# Patient Record
Sex: FEMALE | Race: WHITE | NOT HISPANIC OR LATINO | Employment: FULL TIME | URBAN - METROPOLITAN AREA
[De-identification: names, ages, dates, MRNs, and addresses within clinical notes are randomized per-mention and may not be internally consistent; named-entity substitution may affect disease eponyms.]

---

## 2017-01-03 ENCOUNTER — OFFICE VISIT (OUTPATIENT)
Dept: FAMILY MEDICINE | Facility: CLINIC | Age: 37
End: 2017-01-03
Payer: COMMERCIAL

## 2017-01-03 VITALS
HEART RATE: 76 BPM | TEMPERATURE: 98 F | DIASTOLIC BLOOD PRESSURE: 80 MMHG | HEIGHT: 61 IN | SYSTOLIC BLOOD PRESSURE: 124 MMHG | BODY MASS INDEX: 38 KG/M2 | WEIGHT: 201.25 LBS

## 2017-01-03 DIAGNOSIS — R35.0 URINARY FREQUENCY: ICD-10-CM

## 2017-01-03 DIAGNOSIS — K40.90 UNILATERAL INGUINAL HERNIA WITHOUT OBSTRUCTION OR GANGRENE, RECURRENCE NOT SPECIFIED: Primary | ICD-10-CM

## 2017-01-03 PROCEDURE — 99214 OFFICE O/P EST MOD 30 MIN: CPT | Mod: S$GLB,,, | Performed by: FAMILY MEDICINE

## 2017-01-03 PROCEDURE — 1159F MED LIST DOCD IN RCRD: CPT | Mod: S$GLB,,, | Performed by: FAMILY MEDICINE

## 2017-01-03 PROCEDURE — 99999 PR PBB SHADOW E&M-EST. PATIENT-LVL III: CPT | Mod: PBBFAC,,, | Performed by: FAMILY MEDICINE

## 2017-01-03 NOTE — MR AVS SNAPSHOT
"    [facility]  [address] [facility], [address]  [facility] [address]  Phone: [phone]  Fax: [phone]                  Laura Fitzpatrick   1/3/2017 4:00 PM   Office Visit    Description:  Female : 1980   Provider:  Zulema Ha DO   Department:  [facility]           Reason for Visit     Groin Pain     Stool Color Change     Heartburn           Diagnoses this Visit        Comments    Unilateral inguinal hernia without obstruction or gangrene, recurrence not specified    -  Primary            To Do List           Goals (5 Years of Data)     None      [facility]     [facility] Care [facility] -  Assistance  Registered nurses in the [facility] provide clinical advisement, health education, appointment booking, and other advisory services.  Call for this free service at [phone].             Medications           Message regarding Medications     Verify the changes and/or additions to your medication regime listed below are the same as discussed with your clinician today.  If any of these changes or additions are incorrect, please notify your healthcare provider.             Verify that the below list of medications is an accurate representation of the medications you are currently taking.  If none reported, the list may be blank. If incorrect, please contact your healthcare provider. Carry this list with you in case of emergency.           Current Medications     fluoxetine (PROZAC) 20 MG capsule Take 1 capsule (20 mg total) by mouth once daily.    pantoprazole (PROTONIX) 40 MG tablet Take 1 tablet (40 mg total) by mouth once daily.           Clinical Reference Information           Vital Signs - Last Recorded  Most recent update: 1/3/2017  4:35 PM by Alessia Maxwell MA    BP Pulse Temp Ht    124/80 (BP Location: Left arm, Patient Position: Sitting, BP Method: Manual) 76 97.9 °F (36.6 °C) (Oral) 5' 1" (1.549 m)    Wt LMP BMI "    91.3 kg (201 lb 4.5 oz) 01/02/2017 (Exact Date) 38.03 kg/m2      Blood Pressure          Most Recent Value    BP  124/80      Allergies as of 1/3/2017     No Known Allergies      Immunizations Administered on Date of Encounter - 1/3/2017     None      Orders Placed During Today's Visit      Normal Orders This Visit    Ambulatory consult to General Surgery       Maintenance Dialysis History     Patient has no recorded history of maintenance dialysis.

## 2017-01-03 NOTE — PROGRESS NOTES
"Subjective:       Patient ID: Laura Fitzpatrick is a 36 y.o. female.    Chief Complaint: Groin Pain (started about one month ago comes and goes on left side ); Stool Color Change (Sunday and yesterday); and Heartburn    HPI rt groin pain very sudden and intense  That lasted  About 36 hours- PS 7-8 pain scale, Hx of appendectomy 2011,   Since then still with some constipation , mild general abd cramping, still with intermittent rt LQ pain worse with driving,  No change with lifting or bending or walking.      Then this sat night she had more intense heartburn and also some pain in RUQ, lasting about 6 hours or so, no nausea, no vomting , some burping, no fever , no dysuria or urgency ,   Then Sunday she noticed her stool had a  whitish  And "fuzzy-looking coating". She has noticed some bright  blood in stool- mixed in and with wiping, a decent amount   Review of Systems  per HPI  Objective:      Physical Exam   Constitutional: She appears well-developed and well-nourished.   Cardiovascular: Normal rate, regular rhythm and normal heart sounds.    Pulmonary/Chest: Effort normal and breath sounds normal.   Abdominal: Soft. Bowel sounds are normal.   Slight RUQ tenderness and mid epigastric tenderness    Rt inguinal ligament with acute tenderness, no erythema or edema or induration, mild bulging above inguinal ligament with pain and fullness with valsalva maneuver at this area   Nursing note and vitals reviewed.      Assessment:         Laura was seen today for groin pain, stool color change and heartburn.    Diagnoses and all orders for this visit:    Unilateral inguinal hernia without obstruction or gangrene, recurrence not specified  -     Ambulatory consult to General Surgery      RUQ pain - now resolved - probably a separate issue- possible GERD vs biliary colic.   If symptoms return then notify me and we will order an abd US      "

## 2017-01-16 ENCOUNTER — OFFICE VISIT (OUTPATIENT)
Dept: SURGERY | Facility: CLINIC | Age: 37
End: 2017-01-16
Payer: COMMERCIAL

## 2017-01-16 VITALS
HEART RATE: 83 BPM | SYSTOLIC BLOOD PRESSURE: 121 MMHG | WEIGHT: 200.63 LBS | BODY MASS INDEX: 37.88 KG/M2 | TEMPERATURE: 98 F | HEIGHT: 61 IN | DIASTOLIC BLOOD PRESSURE: 85 MMHG

## 2017-01-16 DIAGNOSIS — K40.90 UNILATERAL INGUINAL HERNIA WITHOUT OBSTRUCTION OR GANGRENE, RECURRENCE NOT SPECIFIED: Primary | ICD-10-CM

## 2017-01-16 DIAGNOSIS — K40.90 RIGHT INGUINAL HERNIA: Primary | ICD-10-CM

## 2017-01-16 PROCEDURE — 99203 OFFICE O/P NEW LOW 30 MIN: CPT | Mod: S$GLB,,, | Performed by: SURGERY

## 2017-01-16 PROCEDURE — 99999 PR PBB SHADOW E&M-EST. PATIENT-LVL III: CPT | Mod: PBBFAC,,, | Performed by: SURGERY

## 2017-01-16 PROCEDURE — 1159F MED LIST DOCD IN RCRD: CPT | Mod: S$GLB,,, | Performed by: SURGERY

## 2017-01-16 NOTE — PROGRESS NOTES
History & Physical    SUBJECTIVE:     History of Present Illness:  Patient is a 36 y.o. female presents with right groin pain. Onset of symptoms was abrupt starting several months ago with gradually worsening course since that time. Patient denies palpable bulge. Symptoms are aggravated by activity. Symptoms improve with rest.      Chief Complaint   Patient presents with    Consult    Right Inguinal Hernia       Review of patient's allergies indicates:  No Known Allergies    Current Outpatient Prescriptions   Medication Sig Dispense Refill    fluoxetine (PROZAC) 20 MG capsule Take 1 capsule (20 mg total) by mouth once daily. 30 capsule 11    pantoprazole (PROTONIX) 40 MG tablet Take 1 tablet (40 mg total) by mouth once daily. 30 tablet 1     No current facility-administered medications for this visit.        Past Medical History   Diagnosis Date    Abnormal Pap smear 12 years ago     Cryotherapy    Obesity (BMI 30-39.9)      Past Surgical History   Procedure Laterality Date    Appendectomy  2011    Myringotomy w/ tubes       Family History   Problem Relation Age of Onset    Heart disease Mother 40    Diabetes Mother     Depression Mother     Anxiety disorder Mother     Obesity Mother     Hypertension Father     Diabetes Father     Obesity Father     Kidney disease Father     Heart disease Maternal Grandmother     Diabetes Maternal Grandmother     Diabetes Maternal Grandfather     Cancer Paternal Grandmother 58     Breast    Diabetes Paternal Grandmother     Breast cancer Paternal Grandmother     Gestational diabetes Sister     Stroke Neg Hx     Colon cancer Neg Hx     Ovarian cancer Neg Hx      Social History   Substance Use Topics    Smoking status: Never Smoker    Smokeless tobacco: Never Used    Alcohol use 0.0 oz/week     0 Standard drinks or equivalent per week      Comment: social        Review of Systems:  Review of Systems   Constitutional: Negative.    HENT: Negative.   "  Respiratory: Negative.    Cardiovascular: Negative.    Gastrointestinal: Negative.    Genitourinary: Negative.    Allergic/Immunologic: Negative.    Neurological: Negative.    Hematological: Negative.        OBJECTIVE:     Vital Signs (Most Recent)  Temp: 98.4 °F (36.9 °C) (01/16/17 0916)  Pulse: 83 (01/16/17 0916)  BP: 121/85 (01/16/17 0916)  5' 1" (1.549 m)  91 kg (200 lb 9.6 oz)     Physical Exam:  Physical Exam   Constitutional: She is oriented to person, place, and time. She appears well-developed and well-nourished.   HENT:   Head: Normocephalic and atraumatic.   Eyes: Conjunctivae and EOM are normal. Pupils are equal, round, and reactive to light.   Neck: Normal range of motion. Neck supple.   Cardiovascular: Normal rate, regular rhythm and normal heart sounds.    Pulmonary/Chest: Effort normal and breath sounds normal.   Abdominal: Soft. Bowel sounds are normal. A hernia is present.   Reducible right inguinal hernia   Musculoskeletal: Normal range of motion.   Neurological: She is alert and oriented to person, place, and time. She has normal reflexes.   Skin: Skin is warm and dry.   Psychiatric: She has a normal mood and affect.       Laboratory  none    Diagnostic Results:  none    ASSESSMENT/PLAN:     Right inguinal hernia    PLAN:Plan     Repair with mesh.       "

## 2017-01-16 NOTE — PATIENT INSTRUCTIONS
What Is a Hernia?    A hernia is when an organ or tissue pushes through a weak area in the belly (abdominal) wall. This weak area may be present at birth. Or it may be caused by abdominal strain over time. If not treated, a hernia can get worse with time and physical stress.  When a bulge forms  When there is a weak area in the abdominal wall, an organ or tissue can push outward. This often causes a bulge that you can see under your skin. The bulge may get bigger when you stand up. It may go away when you lie down. You may also feel some pressure or mild pain when lifting, coughing, urinating, or doing other activities.  Types of hernias  The type of hernia you have depends on its location. Most hernias form in the groin at or near the internal ring. This is the entrance to a canal between the abdomen and groin. Hernias can also occur in the abdomen, thigh, or genitals.  · An incisional hernia occurs at the site of a previous surgical incision.  · An umbilical hernia occurs at the navel.  · An indirect inguinal hernia occurs in the groin at the internal ring.  · A direct inguinal hernia occurs in the groin near the internal ring.  · A femoral hernia occurs just below the groin.  · An epigastric hernia occurs in the upper abdomen at the midline.  Diagnosis  In most cases, your healthcare provider can diagnose a hernia by doing a physical exam.  In some cases it might not be clear why you have a swelling in the belly wall. Then your provider may order an imaging test such as an ultrasound. This can help with the diagnosis.  Surgery  A hernia will not heal on its own. Surgery is needed to fix the weak spot in the abdominal wall. If not treated, a hernia can get larger. It can also cause serious health problems. The good news is that hernia surgery can be done quickly and safely. In some cases, you can go home the same day as your surgery.   When to call your provider  Call your healthcare provider right away if the  swelling around your hernia becomes larger, firmer, or more painful. These may be signs that your intestines are stuck in the abdominal wall. This is an emergency. The hernia must be repaired right away to avoid serious problems.  © 2169-9107 [employer]. [address] [place] [address]. All rights reserved. This information is not intended as a substitute for professional medical care. Always follow your healthcare professional's instructions.        How a Hernia Develops  Although a hernia bulge may appear suddenly, hernias often take years to develop. They grow larger as pressure inside the body presses the intestines or other tissues out through a weak area in the abdominal wall, often at the belly button, or a site of previous surgery. With time, these tissues can bulge out beneath the skin.  Stages of hernia development       The wall weakens or tears: The abdominal lining bulges out through a weak area and begins to form a hernia sac. The sac may contain fat, intestine, or other tissues. At this point, the hernia may or may not cause a visible bulge.        The intestine pushes into the sac: As the intestine pushes further into the sac, it forms a visible bulge. The bulge may flatten when you lie down or push against it. This is called a reducible hernia and does not cause any immediate danger.             The intestine may become trapped: The sac containing the intestine may become trapped by muscle (incarcerated). If this happens, you wont be able to flatten the bulge. You may also have pain. Prompt treatment is needed.        The intestine may become strangulated: If the intestine is tightly trapped, it becomes strangulated. The strangulated area loses blood supply and may die. This can cause severe pain and block the intestine. Emergency surgery is needed.   © 3691-2604 [employer]. [address] [place] [address]. All rights reserved. This information is  not intended as a substitute for professional medical care. Always follow your healthcare professional's instructions.        Having Hernia Surgery: Patch Repair  Surgery treats a hernia by repairing the weakness in the abdominal wall. An incision is made so the surgeon has a direct view of the hernia. The repair is then done through this incision (open surgery). To repair the defect, special mesh materials are used to patch the weak area and make a tension-free repair. Follow your doctors advice on how to get ready for the procedure. You can usually go home the same day as your surgery. In some cases, though, you may need to stay in the hospital overnight.  Getting Ready for Surgery  Your doctor will talk with you about preparing for surgery. Follow all the instructions youre given and be sure to:   · Tell your doctor about any medications, supplements, or herbs you take. This includes both prescription and over-the-counter items.  · Stop taking aspirin, ibuprofen, naproxen and other NSAIDs as directed.  · Arrange for an adult family member or friend to give you a ride home after surgery.  · Stop smoking. Smoking affects blood flow and can slow healing.  · Gently wash the surgical area the night before surgery.  · Dont eat or drink after midnight, the night before your surgery. You may need to take some medication with sips of water on the day of surgery. Talk with your doctor.     Repair in Front           Repair in Back           Combination Repair      The Day of Surgery  Arrive at the hospital or surgical center at your scheduled time. Youll be asked to change into a patient gown. Youll then be given an IV to provide fluids and medication. Shortly before surgery, an anesthesiologist will talk with you. He or she will explain the types of anesthesia used to prevent pain during surgery. You will have one or more of the following:  · Monitored sedation to make you relaxed and sleepy.  · Local anesthesia to  numb the surgical site.  · Regional anesthesia to numb specific areas of your body.  · General anesthesia to let you sleep during surgery.  During the Surgery  Most hernias are treated using tension-free repairs. This is surgery that uses special mesh materials to repair the weak area. Unlike traditional repairs, the abdominal fascia (tissue around the muscle that gives strength to the abdominal wall) isnt sutured together. Instead, the mesh covers the weak area like a patch. This repairs the defect without tension on the muscles. It also makes it less likely to happen again. The mesh is made of strong, flexible plastic that stays in the body. Over time, nearby tissues grow into the mesh to strengthen the repair.  After Surgery  When the procedure is over, youll be taken to the recovery area to rest. Your blood pressure and heart rate will be monitored. Youll also have a bandage over the surgical site. To help reduce discomfort, youll be given pain medications. You may also be given breathing exercises to keep your lungs clear. Later, youll be asked to get up and walk. This helps prevent blood clots in the legs. You can go home when your doctor says youre ready.     Risks and Complications  Hernia surgery is safe, but does have risks including:  · Bleeding  · Infection  · Anesthesia risks  · Mesh complications  · Inability to urinate   · Bowel or bladder injury   · Numbness or pain in the groin or leg  · Risk the hernia will recur  · Damage to the testicles or testicular function      © 6993-0639 [employer]. [address] [place] [address], [place] [address]. All rights reserved. This information is not intended as a substitute for professional medical care. Always follow your healthcare professional's instructions.        After Hernia Surgery    You can usually go home the same day as surgery. If you had surgery to repair ventral or incisional hernia, you may need to stay in the hospital  overnight. To speed healing, take an active role in your recovery. The tips below can help:  Reducing Swelling  Early on, its common for the area around your incision to be swollen, bruised, and sore. To reduce swelling, put an ice pack or bag of frozen peas in a thin towel. Place the towel on the swollen area 3 to 5 times a day for 15 to 20 minutes at a time.  Managing Pain  Take any prescribed pain medications as directed. Be aware that some pain medications can cause constipation. So your doctor may also suggest a laxative or stool softener.  Returning to Normal  You can return to your normal routine as soon as you feel able. Just take it easy and follow these guidelines:  · Take short walks to improve circulation.  · Avoid heavy lifting for at least a week.  · Ask your doctor about driving and returning to work.  · You can begin having sex again when you feel ready.  Following Up  Be sure to keep all follow-up appointments with your doctor. These ensure youre healing well. During visits, your stitches, staples, or bandage may be removed.  Call Your Doctor  Call your doctor if you have any of the following:  · A large amount of swelling or bruising (some testicular swelling and bruising is normal)  · Fever over 101°F (38.3°C)  · Increasing pain, redness, bleeding, or drainage from the incision  · Trouble urinating  · Constipation  · Vomiting   © 6310-6077 [employer]. [address] [place] [address], [place] [address]. All rights reserved. This information is not intended as a substitute for professional medical care. Always follow your healthcare professional's instructions.

## 2017-01-16 NOTE — LETTER
January 16, 2017      Zulema Ha, DO  [address] [facility]  [address]  [facility] [address]           [facility]  [address]  [place] [address]  Phone: [phone]          Patient: Laura Fitzpatrick   MR Number: [id number]   YOB: 1980   Date of Visit: 1/16/2017       Dear Dr. Zulema Ha:    Thank you for referring Laura Fitzpatrick to me for evaluation. Attached you will find relevant portions of my assessment and plan of care.    If you have questions, please do not hesitate to call me. I look forward to following Laura Fitzpatrick along with you.    Sincerely,    Jesus Messina MD    Enclosure  CC:  No Recipients    If you would like to receive this communication electronically, please contact [email or web address] or [phone] to request more information on [employer] Link access.    For providers and/or their staff who would like to refer a patient to [facility], please contact us through our one-stop-shop provider referral line, [facility], at [phone].    If you feel you have received this communication in error or would no longer like to receive these types of communications, please e-mail [email or web address]

## 2017-01-16 NOTE — MR AVS SNAPSHOT
"    [facility] - General Surgery  [address]  [facility] [address]  Phone: [phone]                  Laura Fitzpatrick   2017 9:15 AM   Office Visit    Description:  Female : 1980   Provider:  Jesus Messina MD   Department:  [facility] - General Surgery           Reason for Visit     Consult     Right Inguinal Hernia           Diagnoses this Visit        Comments    Unilateral inguinal hernia without obstruction or gangrene, recurrence not specified    -  Primary            To Do List           Goals (5 Years of Data)     None      [facility]     [facility] -  Assistance  Registered nurses in the [facility] provide clinical advisement, health education, appointment booking, and other advisory services.  Call for this free service at [phone].             Medications           Message regarding Medications     Verify the changes and/or additions to your medication regime listed below are the same as discussed with your clinician today.  If any of these changes or additions are incorrect, please notify your healthcare provider.             Verify that the below list of medications is an accurate representation of the medications you are currently taking.  If none reported, the list may be blank. If incorrect, please contact your healthcare provider. Carry this list with you in case of emergency.           Current Medications     fluoxetine (PROZAC) 20 MG capsule Take 1 capsule (20 mg total) by mouth once daily.    pantoprazole (PROTONIX) 40 MG tablet Take 1 tablet (40 mg total) by mouth once daily.           Clinical Reference Information           Vital Signs - Last Recorded  Most recent update: 2017  9:17 AM by Vinicius Dooley    BP Pulse Temp Ht    121/85 (BP Location: Right arm, Patient Position: Sitting, BP Method: Automatic) 83 98.4 °F (36.9 °C) (Oral) 5' 1" (1.549 m)    Wt LMP BMI    91 kg (200 lb 9.6 oz) 2017 (Exact " Date) 37.9 kg/m2      Blood Pressure          Most Recent Value    BP  121/85      Allergies as of 1/16/2017     No Known Allergies      Immunizations Administered on Date of Encounter - 1/16/2017     None      Maintenance Dialysis History     Patient has no recorded history of maintenance dialysis.      Instructions      What Is a Hernia?    A hernia is when an organ or tissue pushes through a weak area in the belly (abdominal) wall. This weak area may be present at birth. Or it may be caused by abdominal strain over time. If not treated, a hernia can get worse with time and physical stress.  When a bulge forms  When there is a weak area in the abdominal wall, an organ or tissue can push outward. This often causes a bulge that you can see under your skin. The bulge may get bigger when you stand up. It may go away when you lie down. You may also feel some pressure or mild pain when lifting, coughing, urinating, or doing other activities.  Types of hernias  The type of hernia you have depends on its location. Most hernias form in the groin at or near the internal ring. This is the entrance to a canal between the abdomen and groin. Hernias can also occur in the abdomen, thigh, or genitals.  · An incisional hernia occurs at the site of a previous surgical incision.  · An umbilical hernia occurs at the navel.  · An indirect inguinal hernia occurs in the groin at the internal ring.  · A direct inguinal hernia occurs in the groin near the internal ring.  · A femoral hernia occurs just below the groin.  · An epigastric hernia occurs in the upper abdomen at the midline.  Diagnosis  In most cases, your healthcare provider can diagnose a hernia by doing a physical exam.  In some cases it might not be clear why you have a swelling in the belly wall. Then your provider may order an imaging test such as an ultrasound. This can help with the diagnosis.  Surgery  A hernia will not heal on its own. Surgery is needed to fix the weak  spot in the abdominal wall. If not treated, a hernia can get larger. It can also cause serious health problems. The good news is that hernia surgery can be done quickly and safely. In some cases, you can go home the same day as your surgery.   When to call your provider  Call your healthcare provider right away if the swelling around your hernia becomes larger, firmer, or more painful. These may be signs that your intestines are stuck in the abdominal wall. This is an emergency. The hernia must be repaired right away to avoid serious problems.  © 0547-6262 [employer]. [address] [place] [address]. All rights reserved. This information is not intended as a substitute for professional medical care. Always follow your healthcare professional's instructions.        How a Hernia Develops  Although a hernia bulge may appear suddenly, hernias often take years to develop. They grow larger as pressure inside the body presses the intestines or other tissues out through a weak area in the abdominal wall, often at the belly button, or a site of previous surgery. With time, these tissues can bulge out beneath the skin.  Stages of hernia development       The wall weakens or tears: The abdominal lining bulges out through a weak area and begins to form a hernia sac. The sac may contain fat, intestine, or other tissues. At this point, the hernia may or may not cause a visible bulge.        The intestine pushes into the sac: As the intestine pushes further into the sac, it forms a visible bulge. The bulge may flatten when you lie down or push against it. This is called a reducible hernia and does not cause any immediate danger.             The intestine may become trapped: The sac containing the intestine may become trapped by muscle (incarcerated). If this happens, you wont be able to flatten the bulge. You may also have pain. Prompt treatment is needed.        The intestine may become strangulated: If  the intestine is tightly trapped, it becomes strangulated. The strangulated area loses blood supply and may die. This can cause severe pain and block the intestine. Emergency surgery is needed.   © 4872-2405 [employer]. [address] [place] [address], [place] [address]. All rights reserved. This information is not intended as a substitute for professional medical care. Always follow your healthcare professional's instructions.        Having Hernia Surgery: Patch Repair  Surgery treats a hernia by repairing the weakness in the abdominal wall. An incision is made so the surgeon has a direct view of the hernia. The repair is then done through this incision (open surgery). To repair the defect, special mesh materials are used to patch the weak area and make a tension-free repair. Follow your doctors advice on how to get ready for the procedure. You can usually go home the same day as your surgery. In some cases, though, you may need to stay in the hospital overnight.  Getting Ready for Surgery  Your doctor will talk with you about preparing for surgery. Follow all the instructions youre given and be sure to:   · Tell your doctor about any medications, supplements, or herbs you take. This includes both prescription and over-the-counter items.  · Stop taking aspirin, ibuprofen, naproxen and other NSAIDs as directed.  · Arrange for an adult family member or friend to give you a ride home after surgery.  · Stop smoking. Smoking affects blood flow and can slow healing.  · Gently wash the surgical area the night before surgery.  · Dont eat or drink after midnight, the night before your surgery. You may need to take some medication with sips of water on the day of surgery. Talk with your doctor.     Repair in Front           Repair in Back           Combination Repair      The Day of Surgery  Arrive at the hospital or surgical center at your scheduled time. Youll be asked to change into a patient gown. Youll then  be given an IV to provide fluids and medication. Shortly before surgery, an anesthesiologist will talk with you. He or she will explain the types of anesthesia used to prevent pain during surgery. You will have one or more of the following:  · Monitored sedation to make you relaxed and sleepy.  · Local anesthesia to numb the surgical site.  · Regional anesthesia to numb specific areas of your body.  · General anesthesia to let you sleep during surgery.  During the Surgery  Most hernias are treated using tension-free repairs. This is surgery that uses special mesh materials to repair the weak area. Unlike traditional repairs, the abdominal fascia (tissue around the muscle that gives strength to the abdominal wall) isnt sutured together. Instead, the mesh covers the weak area like a patch. This repairs the defect without tension on the muscles. It also makes it less likely to happen again. The mesh is made of strong, flexible plastic that stays in the body. Over time, nearby tissues grow into the mesh to strengthen the repair.  After Surgery  When the procedure is over, youll be taken to the recovery area to rest. Your blood pressure and heart rate will be monitored. Youll also have a bandage over the surgical site. To help reduce discomfort, youll be given pain medications. You may also be given breathing exercises to keep your lungs clear. Later, youll be asked to get up and walk. This helps prevent blood clots in the legs. You can go home when your doctor says youre ready.     Risks and Complications  Hernia surgery is safe, but does have risks including:  · Bleeding  · Infection  · Anesthesia risks  · Mesh complications  · Inability to urinate   · Bowel or bladder injury   · Numbness or pain in the groin or leg  · Risk the hernia will recur  · Damage to the testicles or testicular function      © 8872-2306 [employer]. [address] [place] [address], [place] [address]. All rights reserved. This  information is not intended as a substitute for professional medical care. Always follow your healthcare professional's instructions.        After Hernia Surgery    You can usually go home the same day as surgery. If you had surgery to repair ventral or incisional hernia, you may need to stay in the hospital overnight. To speed healing, take an active role in your recovery. The tips below can help:  Reducing Swelling  Early on, its common for the area around your incision to be swollen, bruised, and sore. To reduce swelling, put an ice pack or bag of frozen peas in a thin towel. Place the towel on the swollen area 3 to 5 times a day for 15 to 20 minutes at a time.  Managing Pain  Take any prescribed pain medications as directed. Be aware that some pain medications can cause constipation. So your doctor may also suggest a laxative or stool softener.  Returning to Normal  You can return to your normal routine as soon as you feel able. Just take it easy and follow these guidelines:  · Take short walks to improve circulation.  · Avoid heavy lifting for at least a week.  · Ask your doctor about driving and returning to work.  · You can begin having sex again when you feel ready.  Following Up  Be sure to keep all follow-up appointments with your doctor. These ensure youre healing well. During visits, your stitches, staples, or bandage may be removed.  Call Your Doctor  Call your doctor if you have any of the following:  · A large amount of swelling or bruising (some testicular swelling and bruising is normal)  · Fever over 101°F (38.3°C)  · Increasing pain, redness, bleeding, or drainage from the incision  · Trouble urinating  · Constipation  · Vomiting   © 2654-8796 [employer]. [address] [place] [address], [place] [address]. All rights reserved. This information is not intended as a substitute for professional medical care. Always follow your healthcare professional's instructions.

## 2017-01-23 ENCOUNTER — TELEPHONE (OUTPATIENT)
Dept: SURGERY | Facility: CLINIC | Age: 37
End: 2017-01-23

## 2017-01-23 NOTE — PRE-PROCEDURE INSTRUCTIONS
PreOp Instructions given:    -- Medication information (what to hold and what to take)   -- NPO guidelines -- DO NOT EAT ANYTHING AFTER MIDNIGHT, INCLUDING GUM, HARD CANDY, MINTS, OR CHEWING TOBACCO.  -- Arrival place and directions given; time to be given the day before procedure by the Surgeon's Office   -- Bathing with antibacterial soap   -- Don't wear any jewelry or bring any valuables AM of surgery   -- No makeup or moisturizer to face   -- No perfume/cologne, powder, lotions or aftershave     Pt verbalized understanding.

## 2017-01-24 ENCOUNTER — ANESTHESIA (OUTPATIENT)
Dept: SURGERY | Facility: HOSPITAL | Age: 37
End: 2017-01-24
Payer: COMMERCIAL

## 2017-01-24 ENCOUNTER — ANESTHESIA EVENT (OUTPATIENT)
Dept: SURGERY | Facility: HOSPITAL | Age: 37
End: 2017-01-24
Payer: COMMERCIAL

## 2017-01-24 ENCOUNTER — HOSPITAL ENCOUNTER (OUTPATIENT)
Facility: HOSPITAL | Age: 37
Discharge: HOME | End: 2017-01-24
Attending: SURGERY | Admitting: SURGERY
Payer: COMMERCIAL

## 2017-01-24 VITALS
OXYGEN SATURATION: 96 % | DIASTOLIC BLOOD PRESSURE: 70 MMHG | WEIGHT: 200 LBS | TEMPERATURE: 98 F | RESPIRATION RATE: 18 BRPM | HEART RATE: 82 BPM | BODY MASS INDEX: 37.76 KG/M2 | SYSTOLIC BLOOD PRESSURE: 120 MMHG | HEIGHT: 61 IN

## 2017-01-24 DIAGNOSIS — K40.90 INGUINAL HERNIA OF RIGHT SIDE WITHOUT OBSTRUCTION OR GANGRENE: ICD-10-CM

## 2017-01-24 LAB
B-HCG UR QL: NEGATIVE
CTP QC/QA: YES

## 2017-01-24 PROCEDURE — 49505 PRP I/HERN INIT REDUC >5 YR: CPT | Mod: RT,,, | Performed by: SURGERY

## 2017-01-24 PROCEDURE — 81025 URINE PREGNANCY TEST: CPT | Performed by: SURGERY

## 2017-01-24 PROCEDURE — D9220A PRA ANESTHESIA: Mod: ANES,,, | Performed by: ANESTHESIOLOGY

## 2017-01-24 PROCEDURE — 63600175 PHARM REV CODE 636 W HCPCS: Performed by: ANESTHESIOLOGY

## 2017-01-24 PROCEDURE — 37000008 HC ANESTHESIA 1ST 15 MINUTES: Performed by: SURGERY

## 2017-01-24 PROCEDURE — 25000003 PHARM REV CODE 250: Performed by: STUDENT IN AN ORGANIZED HEALTH CARE EDUCATION/TRAINING PROGRAM

## 2017-01-24 PROCEDURE — 25000003 PHARM REV CODE 250: Performed by: NURSE ANESTHETIST, CERTIFIED REGISTERED

## 2017-01-24 PROCEDURE — 63600175 PHARM REV CODE 636 W HCPCS

## 2017-01-24 PROCEDURE — 36000706: Performed by: SURGERY

## 2017-01-24 PROCEDURE — 36000707: Performed by: SURGERY

## 2017-01-24 PROCEDURE — 37000009 HC ANESTHESIA EA ADD 15 MINS: Performed by: SURGERY

## 2017-01-24 PROCEDURE — 25000003 PHARM REV CODE 250: Performed by: SURGERY

## 2017-01-24 PROCEDURE — C1781 MESH (IMPLANTABLE): HCPCS | Performed by: SURGERY

## 2017-01-24 PROCEDURE — 71000015 HC POSTOP RECOV 1ST HR: Performed by: SURGERY

## 2017-01-24 PROCEDURE — 88302 TISSUE EXAM BY PATHOLOGIST: CPT | Mod: 26,,, | Performed by: PATHOLOGY

## 2017-01-24 PROCEDURE — 94761 N-INVAS EAR/PLS OXIMETRY MLT: CPT

## 2017-01-24 PROCEDURE — 63600175 PHARM REV CODE 636 W HCPCS: Performed by: NURSE ANESTHETIST, CERTIFIED REGISTERED

## 2017-01-24 PROCEDURE — D9220A PRA ANESTHESIA: Mod: CRNA,,, | Performed by: NURSE ANESTHETIST, CERTIFIED REGISTERED

## 2017-01-24 PROCEDURE — 71000039 HC RECOVERY, EACH ADD'L HOUR: Performed by: SURGERY

## 2017-01-24 PROCEDURE — 88302 TISSUE EXAM BY PATHOLOGIST: CPT | Performed by: PATHOLOGY

## 2017-01-24 PROCEDURE — 71000033 HC RECOVERY, INTIAL HOUR: Performed by: SURGERY

## 2017-01-24 PROCEDURE — 27000221 HC OXYGEN, UP TO 24 HOURS

## 2017-01-24 DEVICE — IMPLANTABLE DEVICE: Type: IMPLANTABLE DEVICE | Site: GROIN | Status: FUNCTIONAL

## 2017-01-24 RX ORDER — ACETAMINOPHEN 10 MG/ML
INJECTION, SOLUTION INTRAVENOUS
Status: DISCONTINUED | OUTPATIENT
Start: 2017-01-24 | End: 2017-01-24

## 2017-01-24 RX ORDER — OXYCODONE AND ACETAMINOPHEN 10; 325 MG/1; MG/1
1 TABLET ORAL ONCE
Status: COMPLETED | OUTPATIENT
Start: 2017-01-24 | End: 2017-01-24

## 2017-01-24 RX ORDER — DEXAMETHASONE SODIUM PHOSPHATE 4 MG/ML
INJECTION, SOLUTION INTRA-ARTICULAR; INTRALESIONAL; INTRAMUSCULAR; INTRAVENOUS; SOFT TISSUE
Status: DISCONTINUED | OUTPATIENT
Start: 2017-01-24 | End: 2017-01-24

## 2017-01-24 RX ORDER — ROCURONIUM BROMIDE 10 MG/ML
INJECTION, SOLUTION INTRAVENOUS
Status: DISCONTINUED | OUTPATIENT
Start: 2017-01-24 | End: 2017-01-24

## 2017-01-24 RX ORDER — KETOROLAC TROMETHAMINE 30 MG/ML
INJECTION, SOLUTION INTRAMUSCULAR; INTRAVENOUS
Status: DISCONTINUED | OUTPATIENT
Start: 2017-01-24 | End: 2017-01-24

## 2017-01-24 RX ORDER — LIDOCAINE HYDROCHLORIDE 10 MG/ML
1 INJECTION, SOLUTION EPIDURAL; INFILTRATION; INTRACAUDAL; PERINEURAL ONCE
Status: COMPLETED | OUTPATIENT
Start: 2017-01-24 | End: 2017-01-24

## 2017-01-24 RX ORDER — PROPOFOL 10 MG/ML
VIAL (ML) INTRAVENOUS
Status: DISCONTINUED | OUTPATIENT
Start: 2017-01-24 | End: 2017-01-24

## 2017-01-24 RX ORDER — GLYCOPYRROLATE 0.2 MG/ML
INJECTION INTRAMUSCULAR; INTRAVENOUS
Status: DISCONTINUED | OUTPATIENT
Start: 2017-01-24 | End: 2017-01-24

## 2017-01-24 RX ORDER — BUPIVACAINE HYDROCHLORIDE 5 MG/ML
INJECTION, SOLUTION EPIDURAL; INTRACAUDAL
Status: DISCONTINUED | OUTPATIENT
Start: 2017-01-24 | End: 2017-01-24 | Stop reason: HOSPADM

## 2017-01-24 RX ORDER — LIDOCAINE HCL/PF 100 MG/5ML
SYRINGE (ML) INTRAVENOUS
Status: DISCONTINUED | OUTPATIENT
Start: 2017-01-24 | End: 2017-01-24

## 2017-01-24 RX ORDER — SUCCINYLCHOLINE CHLORIDE 20 MG/ML
INJECTION INTRAMUSCULAR; INTRAVENOUS
Status: DISCONTINUED | OUTPATIENT
Start: 2017-01-24 | End: 2017-01-24

## 2017-01-24 RX ORDER — DIPHENHYDRAMINE HYDROCHLORIDE 50 MG/ML
25 INJECTION INTRAMUSCULAR; INTRAVENOUS EVERY 6 HOURS PRN
Status: DISCONTINUED | OUTPATIENT
Start: 2017-01-24 | End: 2017-01-24 | Stop reason: HOSPADM

## 2017-01-24 RX ORDER — NEOSTIGMINE METHYLSULFATE 1 MG/ML
INJECTION, SOLUTION INTRAVENOUS
Status: DISCONTINUED | OUTPATIENT
Start: 2017-01-24 | End: 2017-01-24

## 2017-01-24 RX ORDER — FENTANYL CITRATE 50 UG/ML
INJECTION, SOLUTION INTRAMUSCULAR; INTRAVENOUS
Status: DISCONTINUED | OUTPATIENT
Start: 2017-01-24 | End: 2017-01-24

## 2017-01-24 RX ORDER — OXYCODONE AND ACETAMINOPHEN 5; 325 MG/1; MG/1
1-2 TABLET ORAL EVERY 4 HOURS PRN
Qty: 41 TABLET | Refills: 0 | Status: SHIPPED | OUTPATIENT
Start: 2017-01-24 | End: 2017-02-06

## 2017-01-24 RX ORDER — ONDANSETRON 2 MG/ML
INJECTION INTRAMUSCULAR; INTRAVENOUS
Status: DISCONTINUED | OUTPATIENT
Start: 2017-01-24 | End: 2017-01-24

## 2017-01-24 RX ORDER — MIDAZOLAM HYDROCHLORIDE 1 MG/ML
INJECTION, SOLUTION INTRAMUSCULAR; INTRAVENOUS
Status: DISCONTINUED | OUTPATIENT
Start: 2017-01-24 | End: 2017-01-24

## 2017-01-24 RX ORDER — DOCUSATE SODIUM 100 MG/1
100 CAPSULE, LIQUID FILLED ORAL 2 TIMES DAILY
Refills: 0 | COMMUNITY
Start: 2017-01-24 | End: 2017-05-17 | Stop reason: ALTCHOICE

## 2017-01-24 RX ORDER — SODIUM CHLORIDE 9 MG/ML
INJECTION, SOLUTION INTRAVENOUS CONTINUOUS
Status: DISCONTINUED | OUTPATIENT
Start: 2017-01-24 | End: 2017-01-24 | Stop reason: HOSPADM

## 2017-01-24 RX ORDER — SODIUM CHLORIDE 0.9 % (FLUSH) 0.9 %
3 SYRINGE (ML) INJECTION
Status: DISCONTINUED | OUTPATIENT
Start: 2017-01-24 | End: 2017-01-24 | Stop reason: HOSPADM

## 2017-01-24 RX ORDER — OXYCODONE AND ACETAMINOPHEN 5; 325 MG/1; MG/1
1-2 TABLET ORAL EVERY 4 HOURS PRN
Qty: 41 TABLET | Refills: 0 | Status: SHIPPED | OUTPATIENT
Start: 2017-01-24 | End: 2017-01-24

## 2017-01-24 RX ORDER — OXYCODONE AND ACETAMINOPHEN 5; 325 MG/1; MG/1
2 TABLET ORAL EVERY 4 HOURS PRN
Status: DISCONTINUED | OUTPATIENT
Start: 2017-01-24 | End: 2017-01-24 | Stop reason: HOSPADM

## 2017-01-24 RX ORDER — HYDROMORPHONE HYDROCHLORIDE 1 MG/ML
INJECTION, SOLUTION INTRAMUSCULAR; INTRAVENOUS; SUBCUTANEOUS
Status: COMPLETED
Start: 2017-01-24 | End: 2017-01-24

## 2017-01-24 RX ORDER — HYDROMORPHONE HYDROCHLORIDE 1 MG/ML
0.2 INJECTION, SOLUTION INTRAMUSCULAR; INTRAVENOUS; SUBCUTANEOUS EVERY 5 MIN PRN
Status: COMPLETED | OUTPATIENT
Start: 2017-01-24 | End: 2017-01-24

## 2017-01-24 RX ADMIN — HYDROMORPHONE HYDROCHLORIDE 0.2 MG: 1 INJECTION, SOLUTION INTRAMUSCULAR; INTRAVENOUS; SUBCUTANEOUS at 08:01

## 2017-01-24 RX ADMIN — HYDROMORPHONE HYDROCHLORIDE 0.2 MG: 1 INJECTION, SOLUTION INTRAMUSCULAR; INTRAVENOUS; SUBCUTANEOUS at 09:01

## 2017-01-24 RX ADMIN — LIDOCAINE HYDROCHLORIDE 10 MG: 10 INJECTION, SOLUTION EPIDURAL; INFILTRATION; INTRACAUDAL; PERINEURAL at 06:01

## 2017-01-24 RX ADMIN — SODIUM CHLORIDE: 0.9 INJECTION, SOLUTION INTRAVENOUS at 06:01

## 2017-01-24 RX ADMIN — ROCURONIUM BROMIDE 5 MG: 10 INJECTION, SOLUTION INTRAVENOUS at 07:01

## 2017-01-24 RX ADMIN — KETOROLAC TROMETHAMINE 30 MG: 30 INJECTION, SOLUTION INTRAMUSCULAR; INTRAVENOUS at 07:01

## 2017-01-24 RX ADMIN — DEXAMETHASONE SODIUM PHOSPHATE 4 MG: 4 INJECTION, SOLUTION INTRAMUSCULAR; INTRAVENOUS at 07:01

## 2017-01-24 RX ADMIN — FENTANYL CITRATE 150 MCG: 50 INJECTION, SOLUTION INTRAMUSCULAR; INTRAVENOUS at 07:01

## 2017-01-24 RX ADMIN — FENTANYL CITRATE 100 MCG: 50 INJECTION, SOLUTION INTRAMUSCULAR; INTRAVENOUS at 07:01

## 2017-01-24 RX ADMIN — SUCCINYLCHOLINE CHLORIDE 120 MG: 20 INJECTION, SOLUTION INTRAMUSCULAR; INTRAVENOUS at 07:01

## 2017-01-24 RX ADMIN — ONDANSETRON 4 MG: 2 INJECTION INTRAMUSCULAR; INTRAVENOUS at 08:01

## 2017-01-24 RX ADMIN — ROCURONIUM BROMIDE 45 MG: 10 INJECTION, SOLUTION INTRAVENOUS at 07:01

## 2017-01-24 RX ADMIN — GLYCOPYRROLATE 0.6 MG: 0.2 INJECTION, SOLUTION INTRAMUSCULAR; INTRAVENOUS at 08:01

## 2017-01-24 RX ADMIN — ACETAMINOPHEN 1000 MG: 10 INJECTION, SOLUTION INTRAVENOUS at 07:01

## 2017-01-24 RX ADMIN — OXYCODONE HYDROCHLORIDE AND ACETAMINOPHEN 1 TABLET: 10; 325 TABLET ORAL at 08:01

## 2017-01-24 RX ADMIN — OXYCODONE HYDROCHLORIDE AND ACETAMINOPHEN 2 TABLET: 5; 325 TABLET ORAL at 01:01

## 2017-01-24 RX ADMIN — SODIUM CHLORIDE, SODIUM GLUCONATE, SODIUM ACETATE, POTASSIUM CHLORIDE, MAGNESIUM CHLORIDE, SODIUM PHOSPHATE, DIBASIC, AND POTASSIUM PHOSPHATE: .53; .5; .37; .037; .03; .012; .00082 INJECTION, SOLUTION INTRAVENOUS at 08:01

## 2017-01-24 RX ADMIN — LIDOCAINE HYDROCHLORIDE 90 MG: 20 INJECTION, SOLUTION INTRAVENOUS at 07:01

## 2017-01-24 RX ADMIN — NEOSTIGMINE METHYLSULFATE 5 MG: 1 INJECTION INTRAVENOUS at 08:01

## 2017-01-24 RX ADMIN — PROPOFOL 170 MG: 10 INJECTION, EMULSION INTRAVENOUS at 07:01

## 2017-01-24 RX ADMIN — MIDAZOLAM HYDROCHLORIDE 2 MG: 1 INJECTION, SOLUTION INTRAMUSCULAR; INTRAVENOUS at 06:01

## 2017-01-24 RX ADMIN — DEXTROSE 2 G: 50 INJECTION, SOLUTION INTRAVENOUS at 07:01

## 2017-01-24 NOTE — PROGRESS NOTES
"Pt ok to go home per Dr. Morris. Awaiting release then will send pt to phase II. Pt sats 95% while awake. MD aware that pt has some short sleep apnea episodes desating to 88% while sleeping but pt awakes on own and sats return to baseline. Pt and mother instructed to follow up with PCP about possible sleep apnea. Mother states, "I have sleep apnea and so does the patients brother". Pt and mother state complete understanding and have no further questions or concerns at this time.   "

## 2017-01-24 NOTE — ANESTHESIA RELEASE NOTE
"Anesthesia Release from PACU Note    Patient: Laura Fitzpatrick    Procedure(s) Performed: Procedure(s) (LRB):  REPAIR-HERNIA-INGUINAL Open Right with Mesh (Right)  Anesthesia Release from PACU Note    Patient: Laura Fitzpatrick    Procedure(s) Performed: Procedure(s) (LRB):  REPAIR-HERNIA-INGUINAL Open Right with Mesh (Right)    Anesthesia type: GEN    Post pain: Adequate analgesia reported    Post assessment: no apparent anesthetic complications, tolerated procedure well and no evidence of recall    Post vital signs:   Visit Vitals    /67    Pulse 72    Temp 36.9 °C (98.5 °F) (Oral)    Resp 18    Ht 5' 1" (1.549 m)    Wt 90.7 kg (200 lb)    LMP 01/02/2017 (Exact Date)    SpO2 95%    Breastfeeding No    BMI 37.79 kg/m2       Level of consciousness: awake, alert and oriented    Nausea/Vomiting: no nausea/no vomiting    Complications: none    Airway Patency: patent    Respiratory: unassisted, spontaneous ventilation, room air    Cardiovascular: stable and blood pressure at baseline    Hydration: euvolemic        "

## 2017-01-24 NOTE — BRIEF OP NOTE
[facility]  Brief Operative Note     SUMMARY     Surgery Date: 1/24/2017     Surgeon(s) and Role:     * Jesus Messina MD - Primary     * Florin Newell MD - Resident - Assisting        Pre-op Diagnosis:  Right inguinal hernia [K40.90]    Post-op Diagnosis:  Post-Op Diagnosis Codes:     * Right inguinal hernia [K40.90]    Procedure(s) (LRB):  REPAIR-HERNIA-INGUINAL Open Right with Mesh (Right)    Anesthesia: General    Description of the findings of the procedure: Open right inguinal hernia repair with ProGrip mesh    Findings/Key Components: Indirect hernia with weak floor    Estimated Blood Loss: * No values recorded between 1/24/2017  7:35 AM and 1/24/2017  8:15 AM *         Specimens:   Specimen (12h ago through future)    Start     Ordered    01/24/17 0754  Specimen to Pathology - Surgery  Once     Comments:  1.  Hernia sac - permanent    01/24/17 0753          Discharge Note    SUMMARY     Admit Date: 1/24/2017    Discharge Date and Time: 1/24/2017 8:16 AM    Hospital Course (synopsis of major diagnoses, care, treatment, and services provided during the course of the hospital stay): OP surgery     Final Diagnosis: Post-Op Diagnosis Codes:     * Right inguinal hernia [K40.90]    Disposition: Home or Self Care    Follow Up/Patient Instructions:     Medications:  Reconciled Home Medications:   Current Discharge Medication List      START taking these medications    Details   docusate sodium (COLACE) 100 MG capsule Take 1 capsule (100 mg total) by mouth 2 (two) times daily.  Refills: 0      oxycodone-acetaminophen (PERCOCET) 5-325 mg per tablet Take 1-2 tablets by mouth every 4 (four) hours as needed.  Qty: 41 tablet, Refills: 0         CONTINUE these medications which have NOT CHANGED    Details   fluoxetine (PROZAC) 20 MG capsule Take 1 capsule (20 mg total) by mouth once daily.  Qty: 30 capsule, Refills: 11    Associated Diagnoses: Moderate episode of recurrent major depressive  disorder      pantoprazole (PROTONIX) 40 MG tablet Take 1 tablet (40 mg total) by mouth once daily.  Qty: 30 tablet, Refills: 1             Discharge Procedure Orders  Diet general     Lifting restrictions   Order Comments: No more than 10 lbs for 6 weeks     Call MD for:  increased confusion or weakness     Call MD for:  persistent dizziness, light-headedness, or visual disturbances     Call MD for:  worsening rash     Call MD for:  redness, tenderness, or signs of infection (pain, swelling, redness, odor or green/yellow discharge around incision site)     Call MD for:  severe uncontrolled pain     Call MD for:  persistent nausea and vomiting or diarrhea     Call MD for:  temperature >100.4     Call MD for:  severe persistent headache     Call MD for:  difficulty breathing or increased cough     Remove dressing in 48 hours   Order Comments: Ok to take off plastic/ gauze dressing in 2 days.  OK to shower in 2 days.  Steri strips stay on for 2 weeks or until they fall off on their own.  No soaking for 2 weeks and cleared in clinic.       Follow-up Information     Follow up with Jesus Messina MD. Schedule an appointment as soon as possible for a visit in 2 weeks.    Specialty:  General Surgery    Contact information:    [address]  [facility] [id number] [phone]

## 2017-01-24 NOTE — PROGRESS NOTES
Dr. Morris notified that pt is having short episodes of sleep apnea where she desats in the 80's while sleeping. Pt returns immediately to baseline once awaken. Will monitor pt longer before she is sent home. VSS. resp even and unlabored. Pt states pain tolerable at this time.

## 2017-01-24 NOTE — DISCHARGE INSTRUCTIONS
Discharge Instructions for Open Hernia Repair  You had a procedure called open hernia repair. Also called a rupture, a hernia is a tear or weakness in the wall of the belly. This weakness may be present at birth. Or it can be caused by the wear and tear of daily living. Hernias may get worse with time or with physical stress. But surgery can help repair the weakness and eliminate symptoms.  Activity after surgery  Recommendations include the following:  · After surgery, take it easy for the rest of the day. If you had general anesthesia, dont use machinery or power tools, drink alcohol, or make any major decisions for at least the first 24 hours.  · Dont drive while you are still taking opioid pain medicine, and dont drive until you are able to step firmly on the brake pedal without hesitation.  · Ask others to help with chores and errands while you recover.  · Dont lift anything heavier than 10 pounds until your healthcare provider says its OK.  · Dont mow the lawn, use a vacuum , or do other strenuous activities until your healthcare provider says its OK.  · Walk as often as you feel able.  · Continue the coughing and deep breathing exercises that you learned in the hospital.  · Ask your healthcare provider when you can expect to return to work.  · Avoid constipation:  ¨ Eat fruits, vegetables, and whole grains.  ¨ Drink 6 to 8 glasses of water a day, unless otherwise instructed.  ¨ Use a laxative or a mild stool softener as instructed by your healthcare provider.  Bandage and incision care  Tips include the following:  · Do not get the bandage or wound wet for 48 hours.  · If strips of tape were used to close your incision, dont pull them off. Let them fall off on their own.  · Remove any gauze bandage in 48 hours.  · Wash your incision with mild soap and water. Pat it dry. Dont use oils, powders, or lotions on your incision. Do not soak your incision or take tub baths until cleared by your  healthcare provider.  Follow-up care  Keep follow-up appointments during your recovery. These allow your healthcare provider to check your progress and make sure youre healing well. You may also need to have your stitches, staples, or bandage removed. During office visits, tell your healthcare provider if you have any new symptoms. And be sure to ask any questions you have.     When to call your healthcare provider  Call your healthcare provider immediately if you have any of the following:  · A large amount of swelling or bruising (some testicular swelling and bruising is common)  · Bleeding  · Increasing pain  · Increased redness or drainage of the incision  · Fever of 100.4°F (38.0°C) or higher, or as directed by your healthcare provider  · Trouble urinating  · Nausea or vomiting   © 6066-2604 [employer]. [address] [place] [address], [place] [address]. All rights reserved. This information is not intended as a substitute for professional medical care. Always follow your healthcare professional's instructions.

## 2017-01-24 NOTE — TRANSFER OF CARE
"Anesthesia Transfer of Care Note    Patient: Laura Fitzpatrick    Procedure(s) Performed: Procedure(s) (LRB):  REPAIR-HERNIA-INGUINAL Open Right with Mesh (Right)    Patient location: PACU    Anesthesia Type: general    Transport from OR: Transported from OR on 6-10 L/min O2 by face mask with adequate spontaneous ventilation    Post pain: adequate analgesia    Post assessment: no apparent anesthetic complications    Post vital signs: stable    Level of consciousness: awake and alert    Nausea/Vomiting: no nausea/vomiting    Complications: none          Last vitals:   Visit Vitals    /62    Pulse (!) 117    Temp 36.9 °C (98.4 °F) (Oral)    Resp 20    Ht 5' 1" (1.549 m)    Wt 90.7 kg (200 lb)    LMP 01/02/2017 (Exact Date)    SpO2 99%    Breastfeeding No    BMI 37.79 kg/m2     "

## 2017-01-24 NOTE — ANESTHESIA PREPROCEDURE EVALUATION
01/24/2017  Laura Fitzpatrick is a 36 y.o., female.  Pre-operative evaluation for Procedure(s) (LRB):  REPAIR-HERNIA-INGUINAL Open Right with Mesh (Right)    Laura Fitzpatrick is a 36 y.o. female     Patient Active Problem List   Diagnosis    Obesity (BMI 30-39.9)    Enlarged thyroid gland    Mild acid reflux    Neck pain    Cervical radiculopathy    Cervical lymphadenopathy    Right-sided carotid artery disease    Inguinal hernia of right side without obstruction or gangrene       Review of patient's allergies indicates:  No Known Allergies    No current facility-administered medications on file prior to encounter.      Current Outpatient Prescriptions on File Prior to Encounter   Medication Sig Dispense Refill    fluoxetine (PROZAC) 20 MG capsule Take 1 capsule (20 mg total) by mouth once daily. 30 capsule 11    pantoprazole (PROTONIX) 40 MG tablet Take 1 tablet (40 mg total) by mouth once daily. (Patient taking differently: Take 40 mg by mouth nightly as needed. ) 30 tablet 1       Past Surgical History   Procedure Laterality Date    Appendectomy  2011    Myringotomy w/ tubes         Social History     Social History    Marital status: Single     Spouse name: N/A    Number of children: N/A    Years of education: N/A     Occupational History    Speech pathologist       Social History Main Topics    Smoking status: Never Smoker    Smokeless tobacco: Never Used    Alcohol use 0.0 oz/week     0 Standard drinks or equivalent per week      Comment: social    Drug use: No    Sexual activity: Yes     Partners: Male     Other Topics Concern    Not on file     Social History Narrative    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     I have reviewed the Medications.     Review of Systems  Anesthesia Hx:  No problems with previous Anesthesia  History of prior surgery of interest to airway management or planning:  Denies Personal Hx of Anesthesia complications.   Social:  Non-Smoker    Cardiovascular:  Cardiovascular Normal     Pulmonary:  Pulmonary Normal    Hepatic/GI:   GERD, well controlled    Endocrine:  Endocrine Normal        Physical Exam  General:  Well nourished    Airway/Jaw/Neck:  Airway Findings: Mouth Opening: Small, but > 3cm Tongue: Normal  General Airway Assessment: Adult  Mallampati: II  TM Distance: 4 - 6 cm      Dental:  Dental Findings: In tact        Mental Status:  Mental Status Findings:  Cooperative, Alert and Oriented         Anesthesia Plan  Type of Anesthesia, risks & benefits discussed:  Anesthesia Type:  general  Patient's Preference:   Intra-op Monitoring Plan:   Intra-op Monitoring Plan Comments:   Post Op Pain Control Plan:   Post Op Pain Control Plan Comments:   Induction:   IV  Beta Blocker:  Patient is not currently on a Beta-Blocker (No further documentation required).       Informed Consent: Patient understands risks and agrees with Anesthesia plan.  Questions answered. Anesthesia consent signed with patient.  ASA Score: 2     Day of Surgery Review of History & Physical:    H&P update referred to the surgeon.         Ready For Surgery From Anesthesia Perspective.

## 2017-01-24 NOTE — PLAN OF CARE
D/C instructions given to pt. and family. Verb. Understanding. RX for pain given and next time and dose explained. Pt. Tolerating PO fluids. VSS. IV dc'd. Pain level tolerable. Pt. Denies N/V at this time. Family at  for d/c. All consent in chart at time of d/c.

## 2017-01-24 NOTE — IP AVS SNAPSHOT
[facility]  [address]  [facility] [address]  Phone: [phone]           Patient Discharge Instructions     Our goal is to set you up for success. This packet includes information on your condition, medications, and your home care. It will help you to care for yourself so you don't get sicker and need to go back to the hospital.     Please ask your nurse if you have any questions.        There are many details to remember when preparing to leave the hospital. Here is what you will need to do:    1. Take your medicine. If you are prescribed medications, review your Medication List in the following pages. You may have new medications to  at the pharmacy and others that you'll need to stop taking. Review the instructions for how and when to take your medications. Talk with your doctor or nurses if you are unsure of what to do.     2. Go to your follow-up appointments. Specific follow-up information is listed in the following pages. Your may be contacted by a transition nurse or clinical provider about future appointments. Be sure we have all of the phone numbers to reach you, if needed. Please contact your provider's office if you are unable to make an appointment.     3. Watch for warning signs. Your doctor or nurse will give you detailed warning signs to watch for and when to call for assistance. These instructions may also include educational information about your condition. If you experience any of warning signs to your health, call your doctor.               [facility]  Unless otherwise directed by your provider, please contact [facility], our nurse care line that is available for 24/7 assistance.     [phone] (toll-free)    Registered nurses in the [facility] provide clinical advisement, health education, appointment booking, and other advisory services.                    ** Verify the list of medication(s) below is accurate and up  to date. Carry this with you in case of emergency. If your medications have changed, please notify your healthcare provider.             Medication List      START taking these medications        Additional Info                      docusate sodium 100 MG capsule   Commonly known as:  COLACE   Refills:  0   Dose:  100 mg    Instructions:  Take 1 capsule (100 mg total) by mouth 2 (two) times daily.     Begin Date    AM    Noon    PM    Bedtime       oxycodone-acetaminophen 5-325 mg per tablet   Commonly known as:  PERCOCET   Quantity:  41 tablet   Refills:  0   Dose:  1-2 tablet    Instructions:  Take 1-2 tablets by mouth every 4 (four) hours as needed.     Begin Date    AM    Noon    PM    Bedtime         CHANGE how you take these medications        Additional Info                      pantoprazole 40 MG tablet   Commonly known as:  PROTONIX   Quantity:  30 tablet   Refills:  1   Dose:  40 mg   What changed:    - when to take this  - reasons to take this    Instructions:  Take 1 tablet (40 mg total) by mouth once daily.     Begin Date    AM    Noon    PM    Bedtime         CONTINUE taking these medications        Additional Info                      fluoxetine 20 MG capsule   Commonly known as:  PROZAC   Quantity:  30 capsule   Refills:  11   Dose:  20 mg    Instructions:  Take 1 capsule (20 mg total) by mouth once daily.     Begin Date    AM    Noon    PM    Bedtime            Where to Get Your Medications      You can get these medications from any pharmacy     Bring a paper prescription for each of these medications     oxycodone-acetaminophen 5-325 mg per tablet       You don't need a prescription for these medications     docusate sodium 100 MG capsule                  Please bring to all follow up appointments:    1. A copy of your discharge instructions.  2. All medicines you are currently taking in their original bottles.  3. Identification and insurance card.    Please arrive 15 minutes ahead of scheduled  appointment time.    Please call 24 hours in advance if you must reschedule your appointment and/or time.        Your Scheduled Appointments     Feb 06, 2017  9:45 AM CST   Post OP with Jesus Messina MD   [facility] ([address] )    [address]  [facility] [id number] [phone]              Follow-up Information     Follow up with Jesus Messina MD. Schedule an appointment as soon as possible for a visit in 2 weeks.    Specialty:  General Surgery    Contact information:    [address]  [facility] [id number] [phone]          Discharge Instructions     Future Orders    Call MD for:  difficulty breathing or increased cough     Call MD for:  increased confusion or weakness     Call MD for:  persistent dizziness, light-headedness, or visual disturbances     Call MD for:  persistent nausea and vomiting or diarrhea     Call MD for:  redness, tenderness, or signs of infection (pain, swelling, redness, odor or green/yellow discharge around incision site)     Call MD for:  severe persistent headache     Call MD for:  severe uncontrolled pain     Call MD for:  temperature >100.4     Call MD for:  worsening rash     Diet general     Questions:    Total calories:      Fat restriction, if any:      Protein restriction, if any:      Na restriction, if any:      Fluid restriction:      Additional restrictions:      Lifting restrictions     Comments:    No more than 10 lbs for 6 weeks        Discharge Instructions         Discharge Instructions for Open Hernia Repair  You had a procedure called open hernia repair. Also called a rupture, a hernia is a tear or weakness in the wall of the belly. This weakness may be present at birth. Or it can be caused by the wear and tear of daily living. Hernias may get worse with time or with physical stress. But surgery can help repair the weakness and eliminate symptoms.  Activity after surgery  Recommendations include the  following:  · After surgery, take it easy for the rest of the day. If you had general anesthesia, dont use machinery or power tools, drink alcohol, or make any major decisions for at least the first 24 hours.  · Dont drive while you are still taking opioid pain medicine, and dont drive until you are able to step firmly on the brake pedal without hesitation.  · Ask others to help with chores and errands while you recover.  · Dont lift anything heavier than 10 pounds until your healthcare provider says its OK.  · Dont mow the lawn, use a vacuum , or do other strenuous activities until your healthcare provider says its OK.  · Walk as often as you feel able.  · Continue the coughing and deep breathing exercises that you learned in the hospital.  · Ask your healthcare provider when you can expect to return to work.  · Avoid constipation:  ¨ Eat fruits, vegetables, and whole grains.  ¨ Drink 6 to 8 glasses of water a day, unless otherwise instructed.  ¨ Use a laxative or a mild stool softener as instructed by your healthcare provider.  Bandage and incision care  Tips include the following:  · Do not get the bandage or wound wet for 48 hours.  · If strips of tape were used to close your incision, dont pull them off. Let them fall off on their own.  · Remove any gauze bandage in 48 hours.  · Wash your incision with mild soap and water. Pat it dry. Dont use oils, powders, or lotions on your incision. Do not soak your incision or take tub baths until cleared by your healthcare provider.  Follow-up care  Keep follow-up appointments during your recovery. These allow your healthcare provider to check your progress and make sure youre healing well. You may also need to have your stitches, staples, or bandage removed. During office visits, tell your healthcare provider if you have any new symptoms. And be sure to ask any questions you have.     When to call your healthcare provider  Call your healthcare provider  "immediately if you have any of the following:  · A large amount of swelling or bruising (some testicular swelling and bruising is common)  · Bleeding  · Increasing pain  · Increased redness or drainage of the incision  · Fever of 100.4°F (38.0°C) or higher, or as directed by your healthcare provider  · Trouble urinating  · Nausea or vomiting   © 20008514-8531 [employer]. [address] [place] [address]. All rights reserved. This information is not intended as a substitute for professional medical care. Always follow your healthcare professional's instructions.            Primary Diagnosis     Your primary diagnosis was:  Inguinal Hernia Of Right Side Without Obstruction Or Gangrene      Admission Information     Date & Time Provider Department CSN    1/24/2017  5:27 AM Jesus Messina MD [facility] [id number]      Care Providers     Provider Role Specialty Primary office phone    Jesus Messina MD Attending Provider General Surgery [phone]    Jesus Messina MD Surgeon  General Surgery [phone]      Your Vitals Were     BP Pulse Temp Resp Height Weight    111/67 (BP Location: Right arm, Patient Position: Lying, BP Method: Automatic) 65 98.5 °F (36.9 °C) (Oral) 18 5' 1" (1.549 m) 90.7 kg (200 lb)    Last Period SpO2 BMI          01/02/2017 (Exact Date) 97% 37.79 kg/m2        Recent Lab Values        1/31/2015                           9:28 AM           A1C 5.4                       Pending Labs     Order Current Status    Specimen to Pathology - Surgery In process      Allergies as of 1/24/2017     No Known Allergies      Advance Directives     An advance directive is a document which, in the event you are no longer able to make decisions for yourself, tells your healthcare team what kind of treatment you do or do not want to receive, or who you would like to make those decisions for you.  If you do not currently have an advance directive, [facility] " encourages you to create one.  For more information call:  [phone]), [phone] ([phone]),  or log on to [email or web address].        Language Assistance Services     ATTENTION: Language assistance services are available, free of charge. Please call [phone].      ATENCIÓN: Si habla español, tiene a estrada disposición servicios gratuitos de asistencia lingüística. Llame al [phone].     [facility] Ý: N?u b?n nói Ti?ng Vi?t, có các d?ch v? h? tr? ngôn ng? mi?n phí dành cho b?n. G?i s? [phone].         [facility] complies with applicable Federal civil rights laws and does not discriminate on the basis of race, color, national origin, age, disability, or sex.

## 2017-01-24 NOTE — ANESTHESIA POSTPROCEDURE EVALUATION
"Anesthesia Post Evaluation    Patient: Laura Fitzpatrick    Procedure(s) Performed: Procedure(s) (LRB):  REPAIR-HERNIA-INGUINAL Open Right with Mesh (Right)    Final Anesthesia Type: general  Patient location during evaluation: PACU  Patient participation: Yes- Able to Participate  Level of consciousness: awake and alert  Post-procedure vital signs: reviewed and stable  Pain management: adequate  Airway patency: patent  PONV status at discharge: No PONV  Anesthetic complications: no      Cardiovascular status: stable  Respiratory status: unassisted and spontaneous ventilation  Hydration status: euvolemic  Follow-up not needed.        Visit Vitals    /67    Pulse 72    Temp 36.9 °C (98.5 °F) (Oral)    Resp 18    Ht 5' 1" (1.549 m)    Wt 90.7 kg (200 lb)    LMP 01/02/2017 (Exact Date)    SpO2 95%    Breastfeeding No    BMI 37.79 kg/m2       Pain/Stephanie Score: Pain Assessment Performed: Yes (1/24/2017 10:30 AM)  Presence of Pain: complains of pain/discomfort (1/24/2017 10:30 AM)  Pain Rating Prior to Med Admin: 7 (1/24/2017  9:20 AM)  Pain Rating Post Med Admin: 5 (1/24/2017  9:30 AM)  Stephanie Score: 9 (1/24/2017 10:30 AM)      "

## 2017-01-24 NOTE — OP NOTE
DATE OF PROCEDURE:  01/24/2017.    PREOPERATIVE DIAGNOSIS:  Right inguinal hernia.    POSTOPERATIVE DIAGNOSIS:  Right inguinal hernia.    PROCEDURE:  Repair right inguinal hernia with implantation of prosthetic mesh.    SURGEON:  Jesus Messina M.D.    ASSISTANT:  Florin Newell M.D. (RES)     ANESTHESIA:  General.    CLINICAL NOTE:  The patient is a 36-year-old female with a symptomatic right   inguinal hernia.    PROCEDURE NOTE:  Following induction of adequate general anesthesia, the   patient's lower abdomen and groin were shaved, prepped and draped in a sterile   fashion with ChloraPrep.  I made a transverse incision over the lower aspect of   the abdominal wall and dissect down to the aponeurosis to the external oblique.    We opened the oblique in the direction of its fibers to and through the   external ring and then reflected back flaps underneath the oblique to expose   Hesselbach's triangle.  We isolated her round ligament and there was a small   indirect inguinal hernia sac.  We ligated this at the internal ring along with   the ligamentum, and we also ligated at the ligament distally.  We removed the   interval portion.  We then reinforced the entire floor of Hesselbach's triangle   with a piece of Parietex ProGrip mesh, secured inferiorly at the symphysis,   laterally along the shelving edge and medially along the conjoined tendon,   reinforcing the entire floor in a tension free fashion.  We then irrigated and   obtained hemostasis with cautery and then infiltrated Marcaine for postoperative   pain control.  We closed the external oblique aponeurosis with running Vicryl,   the Courtney fascia with interrupted Vicryl and the skin with subcuticular 4-0   Monocryl suture.  Sterile dressings were then applied and the procedure was   terminated with the patient tolerating it well.    ESTIMATED BLOOD LOSS:  10 mL.      MCT/HN  dd: 01/24/2017 11:20:21 (CST)  td: 01/24/2017 13:00:30 (CST)  Doc ID   #[id number]   Job ID #[id number]    CC:

## 2017-01-24 NOTE — PROGRESS NOTES
Dr. Morris at bedside, advised nurse to watch patient for twenty more minutes, then call for a release.

## 2017-02-06 ENCOUNTER — OFFICE VISIT (OUTPATIENT)
Dept: SURGERY | Facility: CLINIC | Age: 37
End: 2017-02-06
Payer: COMMERCIAL

## 2017-02-06 VITALS
TEMPERATURE: 98 F | HEART RATE: 93 BPM | SYSTOLIC BLOOD PRESSURE: 117 MMHG | BODY MASS INDEX: 37.76 KG/M2 | WEIGHT: 200 LBS | HEIGHT: 61 IN | DIASTOLIC BLOOD PRESSURE: 81 MMHG

## 2017-02-06 DIAGNOSIS — Z87.19 S/P RIGHT INGUINAL HERNIA REPAIR: Primary | ICD-10-CM

## 2017-02-06 DIAGNOSIS — Z98.890 S/P RIGHT INGUINAL HERNIA REPAIR: Primary | ICD-10-CM

## 2017-02-06 PROCEDURE — 99999 PR PBB SHADOW E&M-EST. PATIENT-LVL III: CPT | Mod: PBBFAC,,, | Performed by: PHYSICIAN ASSISTANT

## 2017-02-06 PROCEDURE — 99024 POSTOP FOLLOW-UP VISIT: CPT | Mod: S$GLB,,, | Performed by: PHYSICIAN ASSISTANT

## 2017-02-06 NOTE — PROGRESS NOTES
SUBJECTIVE:  The patient is a 36 y.o. y/o female 2 weeks s/p right inguinal hernia repair. She denies pain, fevers, chills, nausea, vomiting, diarrhea, or constipation. Eating well with normal appetite and bowel function. Denies redness around or drainage from incisions. Still has some soreness down her right inner thigh. States she is now back to work since last Wednesday.    OBJECTIVE:  GEN: female in NAD  ABD: soft, non-tender, non-distended  INCISIONS: clean, dry and intact; healing well without signs of infection or hernia    ASSESSMENT/PLAN:  Doing well 2 weeks s/p right inguinal hernia repair. Patient is advised to avoid heavy lifting or strenuous activity for another 2-4 weeks. May resume light cardio. Patient may bathe and continue to take a regular diet. Will follow-up with me on an as-needed basis. All questions answered; patient is comfortable with follow-up plan.

## 2017-05-17 ENCOUNTER — LAB VISIT (OUTPATIENT)
Dept: LAB | Facility: HOSPITAL | Age: 37
End: 2017-05-17
Attending: FAMILY MEDICINE
Payer: COMMERCIAL

## 2017-05-17 ENCOUNTER — OFFICE VISIT (OUTPATIENT)
Dept: FAMILY MEDICINE | Facility: CLINIC | Age: 37
End: 2017-05-17
Payer: COMMERCIAL

## 2017-05-17 VITALS
DIASTOLIC BLOOD PRESSURE: 74 MMHG | BODY MASS INDEX: 37.88 KG/M2 | SYSTOLIC BLOOD PRESSURE: 114 MMHG | HEIGHT: 61 IN | TEMPERATURE: 99 F | WEIGHT: 200.63 LBS | HEART RATE: 90 BPM

## 2017-05-17 DIAGNOSIS — R10.30 LOWER ABDOMINAL PAIN: Primary | ICD-10-CM

## 2017-05-17 DIAGNOSIS — R10.30 LOWER ABDOMINAL PAIN: ICD-10-CM

## 2017-05-17 DIAGNOSIS — R79.89 LOW TSH LEVEL: ICD-10-CM

## 2017-05-17 DIAGNOSIS — K21.9 GASTROESOPHAGEAL REFLUX DISEASE, ESOPHAGITIS PRESENCE NOT SPECIFIED: ICD-10-CM

## 2017-05-17 PROBLEM — K40.90 INGUINAL HERNIA OF RIGHT SIDE WITHOUT OBSTRUCTION OR GANGRENE: Status: RESOLVED | Noted: 2017-01-24 | Resolved: 2017-05-17

## 2017-05-17 LAB
ALBUMIN SERPL BCP-MCNC: 4 G/DL
ALP SERPL-CCNC: 78 U/L
ALT SERPL W/O P-5'-P-CCNC: 20 U/L
ANION GAP SERPL CALC-SCNC: 12 MMOL/L
AST SERPL-CCNC: 18 U/L
BASOPHILS # BLD AUTO: 0.01 K/UL
BASOPHILS NFR BLD: 0.1 %
BILIRUB SERPL-MCNC: 0.4 MG/DL
BILIRUB UR QL STRIP: NEGATIVE
BUN SERPL-MCNC: 10 MG/DL
CALCIUM SERPL-MCNC: 9.5 MG/DL
CHLORIDE SERPL-SCNC: 102 MMOL/L
CLARITY UR REFRACT.AUTO: CLEAR
CO2 SERPL-SCNC: 23 MMOL/L
COLOR UR AUTO: NORMAL
CREAT SERPL-MCNC: 0.8 MG/DL
DIFFERENTIAL METHOD: NORMAL
EOSINOPHIL # BLD AUTO: 0.1 K/UL
EOSINOPHIL NFR BLD: 1.5 %
ERYTHROCYTE [DISTWIDTH] IN BLOOD BY AUTOMATED COUNT: 13.9 %
EST. GFR  (AFRICAN AMERICAN): >60 ML/MIN/1.73 M^2
EST. GFR  (NON AFRICAN AMERICAN): >60 ML/MIN/1.73 M^2
GLUCOSE SERPL-MCNC: 84 MG/DL
GLUCOSE UR QL STRIP: NEGATIVE
HCT VFR BLD AUTO: 41.1 %
HGB BLD-MCNC: 13.4 G/DL
HGB UR QL STRIP: NEGATIVE
KETONES UR QL STRIP: NEGATIVE
LEUKOCYTE ESTERASE UR QL STRIP: NEGATIVE
LYMPHOCYTES # BLD AUTO: 1.7 K/UL
LYMPHOCYTES NFR BLD: 19.7 %
MCH RBC QN AUTO: 27.5 PG
MCHC RBC AUTO-ENTMCNC: 32.6 %
MCV RBC AUTO: 84 FL
MONOCYTES # BLD AUTO: 0.9 K/UL
MONOCYTES NFR BLD: 10.1 %
NEUTROPHILS # BLD AUTO: 5.7 K/UL
NEUTROPHILS NFR BLD: 68.2 %
NITRITE UR QL STRIP: NEGATIVE
PH UR STRIP: 7 [PH] (ref 5–8)
PLATELET # BLD AUTO: 242 K/UL
PMV BLD AUTO: 10.8 FL
POTASSIUM SERPL-SCNC: 3.8 MMOL/L
PROT SERPL-MCNC: 8.4 G/DL
PROT UR QL STRIP: NEGATIVE
RBC # BLD AUTO: 4.87 M/UL
SODIUM SERPL-SCNC: 137 MMOL/L
SP GR UR STRIP: 1 (ref 1–1.03)
TSH SERPL DL<=0.005 MIU/L-ACNC: 1.64 UIU/ML
URN SPEC COLLECT METH UR: NORMAL
UROBILINOGEN UR STRIP-ACNC: NEGATIVE EU/DL
WBC # BLD AUTO: 8.41 K/UL

## 2017-05-17 PROCEDURE — 80053 COMPREHEN METABOLIC PANEL: CPT

## 2017-05-17 PROCEDURE — 81003 URINALYSIS AUTO W/O SCOPE: CPT

## 2017-05-17 PROCEDURE — 36415 COLL VENOUS BLD VENIPUNCTURE: CPT | Mod: PO

## 2017-05-17 PROCEDURE — 99999 PR PBB SHADOW E&M-EST. PATIENT-LVL III: CPT | Mod: PBBFAC,,, | Performed by: FAMILY MEDICINE

## 2017-05-17 PROCEDURE — 1160F RVW MEDS BY RX/DR IN RCRD: CPT | Mod: S$GLB,,, | Performed by: FAMILY MEDICINE

## 2017-05-17 PROCEDURE — 85025 COMPLETE CBC W/AUTO DIFF WBC: CPT

## 2017-05-17 PROCEDURE — 99214 OFFICE O/P EST MOD 30 MIN: CPT | Mod: S$GLB,,, | Performed by: FAMILY MEDICINE

## 2017-05-17 PROCEDURE — 84443 ASSAY THYROID STIM HORMONE: CPT

## 2017-05-17 RX ORDER — OMEPRAZOLE 20 MG/1
20 CAPSULE, DELAYED RELEASE ORAL 2 TIMES DAILY
COMMUNITY
End: 2017-05-17

## 2017-05-17 NOTE — PROGRESS NOTES
Subjective:       Patient ID: Laura Fitzpatrick is a 37 y.o. female.    Chief Complaint: GI Problem and Abdominal Pain (lower abdomen/pelvic region)    Abdominal Pain   This is a recurrent problem. The current episode started more than 1 month ago. The onset quality is sudden. The problem occurs constantly. The most recent episode lasted 4 weeks. The problem has been waxing and waning. The pain is located in the RLQ and generalized abdominal region. The pain is at a severity of 6/10. The pain is moderate. The quality of the pain is cramping and sharp. Associated symptoms include constipation (hx of constipation - goes back and forth from constipation to diarrhea,  ), diarrhea (intermittent. ), flatus, melena and nausea (at times makes the RLQ worse. ). Pertinent negatives include no anorexia, arthralgias, belching, dysuria, fever, frequency, headaches, hematochezia, hematuria, myalgias, vomiting or weight loss. The pain is aggravated by nothing, certain positions and eating. She has tried acetaminophen and antacids for the symptoms. The treatment provided mild relief. Prior diagnostic workup includes surgery. Her past medical history is significant for abdominal surgery and GERD. There is no history of colon cancer, Crohn's disease, gallstones, irritable bowel syndrome, pancreatitis, PUD or ulcerative colitis.     Review of Systems   Constitutional: Negative for fever and weight loss.   Gastrointestinal: Positive for abdominal pain, constipation (hx of constipation - goes back and forth from constipation to diarrhea,  ), diarrhea (intermittent. ), flatus, melena and nausea (at times makes the RLQ worse. ). Negative for anorexia, blood in stool (somedays stools are light other days normal), hematochezia and vomiting.        Rt LQ pain - present last jan -she had hernia surgery in Feb and she seemed better until 1 month ago when pain returned and is more severe.2 different pains1)Pain started in RLQ(more dull and  "acheyPS 5 on average and 8 when severe ) but it radiates to the mid lower abd and even to the LLQ And her stools or "bizarre" again. Her stools are covered by a white mucous material most of the time. No blood in the stool.  2) sharp intermittent stabby abd pain(PS 8) that can be anywhere in lower abdomen( right or left) those that only last for a few seconds to a few minutes.     Lastly she has a 3 pain in the left flank region present constantly for 3 weeks , dull PS 2 .   Genitourinary: Negative for dysuria, frequency and hematuria.        Menses regular.   Musculoskeletal: Negative for arthralgias and myalgias.   Neurological: Negative for headaches.       Objective:      Physical Exam   Constitutional: She is oriented to person, place, and time. She appears well-developed and well-nourished.   HENT:   Head: Normocephalic and atraumatic.   Right Ear: External ear normal.   Left Ear: External ear normal.   Nose: Nose normal.   Mouth/Throat: Oropharynx is clear and moist.   Eyes: Conjunctivae and EOM are normal. Pupils are equal, round, and reactive to light.   Neck: Normal range of motion. Neck supple. No JVD present. No thyromegaly present.   Cardiovascular: Normal rate, regular rhythm, normal heart sounds and intact distal pulses.    No murmur heard.  Pulmonary/Chest: Effort normal and breath sounds normal.   Abdominal: Soft. She exhibits no mass. There is no tenderness (marked rt and left LQ tenderness , postive gaurding , negative rebound).   Quiet BS, no masses, no pain at incision where hernia was repaired   Musculoskeletal: Normal range of motion. She exhibits no edema.   Lymphadenopathy:     She has no cervical adenopathy.   Neurological: She is alert and oriented to person, place, and time. She has normal reflexes.   Skin: Skin is warm and dry.   Psychiatric: She has a normal mood and affect. Her behavior is normal. Judgment and thought content normal.   Vitals reviewed.      Assessment:         Laura was " seen today for gi problem and abdominal pain.    Diagnoses and all orders for this visit:    Lower abdominal pain  -     CT Abdomen Pelvis With Contrast; Future  -     CBC auto differential; Future  -     Comprehensive metabolic panel; Future  -     Urinalysis; Future  -     Stool culture; Future  -     WBC, Stool; Future  -     Urinalysis  Use aleve for pain in meantime  Gastroesophageal reflux disease, esophagitis presence not specified  -     CT Abdomen Pelvis With Contrast; Future    Low TSH level  -     TSH; Future      I will be in touch with the test results and make further recommendations then    Answers for HPI/ROS submitted by the patient on 5/15/2017   kidney stones: No  UTI: No

## 2017-05-17 NOTE — MR AVS SNAPSHOT
[facility]  [address] [facility], [address]  [facility] [address]  Phone: [phone]  Fax: [phone]                  Laura Fitzpatrick   2017 3:00 PM   Office Visit    Description:  Female : 1980   Provider:  Zulema Ha DO   Department:  [facility]           Reason for Visit     GI Problem     Abdominal Pain           Diagnoses this Visit        Comments    Lower abdominal pain    -  Primary     Gastroesophageal reflux disease, esophagitis presence not specified         Low TSH level                To Do List           Future Appointments        Provider Department Dept Phone    2017 8:00 AM Lucho Coker MD [facility] - Gastroenterology [phone]      Goals (5 Years of Data)     None      [facility]     [facility] On Call Nurse Care Line -  Assistance  Unless otherwise directed by your provider, please contact [facility], our nurse care line that is available for  assistance.     Registered nurses in the [facility] On Call Center provide: appointment scheduling, clinical advisement, health education, and other advisory services.  Call: [phone] (toll free)               Medications           Message regarding Medications     Verify the changes and/or additions to your medication regime listed below are the same as discussed with your clinician today.  If any of these changes or additions are incorrect, please notify your healthcare provider.        STOP taking these medications     docusate sodium (COLACE) 100 MG capsule Take 1 capsule (100 mg total) by mouth 2 (two) times daily.    fluoxetine (PROZAC) 20 MG capsule Take 1 capsule (20 mg total) by mouth once daily.    omeprazole (PRILOSEC) 20 MG capsule Take 20 mg by mouth 2 (two) times daily.           Verify that the below list of medications is an accurate representation of the medications you are currently taking.  If none reported, the list may be blank. If incorrect,  "please contact your healthcare provider. Carry this list with you in case of emergency.           Current Medications     pantoprazole (PROTONIX) 40 MG tablet Take 1 tablet (40 mg total) by mouth once daily.           Clinical Reference Information           Your Vitals Were     BP Pulse Temp Height Weight Last Period    114/74 90 98.5 °F (36.9 °C) 5' 1" (1.549 m) 91 kg (200 lb 9.9 oz) 04/23/2017 (Exact Date)    BMI                37.91 kg/m2          Blood Pressure          Most Recent Value    BP  114/74      Allergies as of 5/17/2017     No Known Allergies      Immunizations Administered on Date of Encounter - 5/17/2017     None      Orders Placed During Today's Visit     Future Labs/Procedures Expected by Expires    CBC auto differential  5/17/2017 7/16/2018    Comprehensive metabolic panel  5/17/2017 7/16/2018    CT Abdomen Pelvis With Contrast  5/17/2017 5/17/2018    Stool culture  5/17/2017 5/17/2018    TSH  5/17/2017 7/16/2018    Urinalysis  5/17/2017 5/17/2018    WBC, Stool  5/17/2017 5/17/2018      Maintenance Dialysis History     Patient has no recorded history of maintenance dialysis.      Language Assistance Services     ATTENTION: Language assistance services are available, free of charge. Please call [phone].      ATENCIÓN: Si lovelyla angelic, tiene a estrada disposición servicios gratuitos de asistencia lingüística. Llame al [phone].     CHÚ Ý: N?u b?n nói Ti?ng Vi?t, có các d?ch v? h? tr? ngôn ng? mi?n phí dành cho b?n. G?i s? [phone].         [facility] complies with applicable Federal civil rights laws and does not discriminate on the basis of race, color, national origin, age, disability, or sex.        "

## 2017-05-18 ENCOUNTER — HOSPITAL ENCOUNTER (OUTPATIENT)
Dept: RADIOLOGY | Facility: HOSPITAL | Age: 37
Discharge: HOME | End: 2017-05-18
Attending: FAMILY MEDICINE
Payer: COMMERCIAL

## 2017-05-18 ENCOUNTER — LAB VISIT (OUTPATIENT)
Dept: LAB | Facility: HOSPITAL | Age: 37
End: 2017-05-18
Attending: FAMILY MEDICINE
Payer: COMMERCIAL

## 2017-05-18 DIAGNOSIS — K21.9 GASTROESOPHAGEAL REFLUX DISEASE, ESOPHAGITIS PRESENCE NOT SPECIFIED: ICD-10-CM

## 2017-05-18 DIAGNOSIS — R10.30 LOWER ABDOMINAL PAIN: ICD-10-CM

## 2017-05-18 LAB — WBC #/AREA STL HPF: NORMAL /[HPF]

## 2017-05-18 PROCEDURE — 74177 CT ABD & PELVIS W/CONTRAST: CPT | Mod: 26,,, | Performed by: RADIOLOGY

## 2017-05-18 PROCEDURE — 25500020 PHARM REV CODE 255: Performed by: FAMILY MEDICINE

## 2017-05-18 PROCEDURE — 87045 FECES CULTURE AEROBIC BACT: CPT

## 2017-05-18 PROCEDURE — 87427 SHIGA-LIKE TOXIN AG IA: CPT

## 2017-05-18 PROCEDURE — 89055 LEUKOCYTE ASSESSMENT FECAL: CPT

## 2017-05-18 PROCEDURE — 87046 STOOL CULTR AEROBIC BACT EA: CPT

## 2017-05-18 PROCEDURE — 74177 CT ABD & PELVIS W/CONTRAST: CPT | Mod: TC

## 2017-05-18 RX ADMIN — IOHEXOL 100 ML: 350 INJECTION, SOLUTION INTRAVENOUS at 04:05

## 2017-05-19 ENCOUNTER — PATIENT MESSAGE (OUTPATIENT)
Dept: FAMILY MEDICINE | Facility: CLINIC | Age: 37
End: 2017-05-19

## 2017-05-19 DIAGNOSIS — R19.7 DIARRHEA, UNSPECIFIED TYPE: Primary | ICD-10-CM

## 2017-05-19 LAB
E COLI SXT1 STL QL IA: NEGATIVE
E COLI SXT2 STL QL IA: NEGATIVE

## 2017-05-19 RX ORDER — PANTOPRAZOLE SODIUM 40 MG/1
40 TABLET, DELAYED RELEASE ORAL NIGHTLY
Qty: 30 TABLET | Refills: 5 | Status: SHIPPED | OUTPATIENT
Start: 2017-05-19 | End: 2018-07-02

## 2017-05-19 NOTE — TELEPHONE ENCOUNTER
----- Message from Gisel Rutledge sent at 5/19/2017  8:42 AM CDT -----  When pt was in the other day, Dr. Ha was supposed to escribe a prescription for pantoprazole (PROTONIX) 40 MG tablet. Pharmacy advises they have not received the prescription.   Please send the prescription to [facility].

## 2017-05-22 ENCOUNTER — LAB VISIT (OUTPATIENT)
Dept: LAB | Facility: HOSPITAL | Age: 37
End: 2017-05-22
Attending: FAMILY MEDICINE
Payer: COMMERCIAL

## 2017-05-22 DIAGNOSIS — R19.7 DIARRHEA, UNSPECIFIED TYPE: ICD-10-CM

## 2017-05-22 LAB — BACTERIA STL CULT: NORMAL

## 2017-05-22 PROCEDURE — 87338 HPYLORI STOOL AG IA: CPT

## 2017-05-24 LAB — H PYLORI AG STL QL: NOT DETECTED

## 2017-05-29 ENCOUNTER — OFFICE VISIT (OUTPATIENT)
Dept: GASTROENTEROLOGY | Facility: CLINIC | Age: 37
End: 2017-05-29
Payer: COMMERCIAL

## 2017-05-29 VITALS
HEIGHT: 61 IN | WEIGHT: 199.5 LBS | DIASTOLIC BLOOD PRESSURE: 78 MMHG | BODY MASS INDEX: 37.66 KG/M2 | SYSTOLIC BLOOD PRESSURE: 116 MMHG | HEART RATE: 86 BPM

## 2017-05-29 DIAGNOSIS — R10.9 ABDOMINAL WALL PAIN: ICD-10-CM

## 2017-05-29 DIAGNOSIS — R11.0 NAUSEA: ICD-10-CM

## 2017-05-29 DIAGNOSIS — R19.7 DIARRHEA, UNSPECIFIED TYPE: ICD-10-CM

## 2017-05-29 DIAGNOSIS — R10.30 LOWER ABDOMINAL PAIN: ICD-10-CM

## 2017-05-29 DIAGNOSIS — K21.9 GASTROESOPHAGEAL REFLUX DISEASE, ESOPHAGITIS PRESENCE NOT SPECIFIED: Primary | ICD-10-CM

## 2017-05-29 PROCEDURE — 99204 OFFICE O/P NEW MOD 45 MIN: CPT | Mod: S$GLB,,, | Performed by: INTERNAL MEDICINE

## 2017-05-29 PROCEDURE — 99999 PR PBB SHADOW E&M-EST. PATIENT-LVL III: CPT | Mod: PBBFAC,,, | Performed by: INTERNAL MEDICINE

## 2017-05-29 RX ORDER — DICYCLOMINE HYDROCHLORIDE 10 MG/1
10 CAPSULE ORAL 3 TIMES DAILY PRN
Qty: 90 CAPSULE | Refills: 0 | Status: SHIPPED | OUTPATIENT
Start: 2017-05-29 | End: 2017-06-28

## 2017-05-30 ENCOUNTER — LAB VISIT (OUTPATIENT)
Dept: LAB | Facility: HOSPITAL | Age: 37
End: 2017-05-30
Attending: INTERNAL MEDICINE
Payer: COMMERCIAL

## 2017-05-30 DIAGNOSIS — R19.7 DIARRHEA, UNSPECIFIED TYPE: ICD-10-CM

## 2017-05-30 DIAGNOSIS — R10.30 LOWER ABDOMINAL PAIN: ICD-10-CM

## 2017-05-30 LAB
CRYPTOSP AG STL QL IA: NEGATIVE
G LAMBLIA AG STL QL IA: NEGATIVE

## 2017-05-30 PROCEDURE — 83993 ASSAY FOR CALPROTECTIN FECAL: CPT

## 2017-05-30 PROCEDURE — 87209 SMEAR COMPLEX STAIN: CPT

## 2017-05-30 PROCEDURE — 87329 GIARDIA AG IA: CPT

## 2017-05-31 ENCOUNTER — HOSPITAL ENCOUNTER (OUTPATIENT)
Dept: RADIOLOGY | Facility: HOSPITAL | Age: 37
Discharge: HOME | End: 2017-05-31
Attending: INTERNAL MEDICINE
Payer: COMMERCIAL

## 2017-05-31 DIAGNOSIS — R19.7 DIARRHEA, UNSPECIFIED TYPE: ICD-10-CM

## 2017-05-31 DIAGNOSIS — R11.0 NAUSEA: ICD-10-CM

## 2017-05-31 LAB — O+P STL TRI STN: NORMAL

## 2017-05-31 PROCEDURE — 76700 US EXAM ABDOM COMPLETE: CPT | Mod: TC

## 2017-05-31 PROCEDURE — 76700 US EXAM ABDOM COMPLETE: CPT | Mod: 26,,, | Performed by: RADIOLOGY

## 2017-06-05 LAB — CALPROTECTIN STL-MCNT: <15.6 MCG/G

## 2017-06-05 NOTE — PROGRESS NOTES
[facility] Consultation Note    Reason for Consult:    Chief Complaint   Patient presents with    Abdominal Pain     lower abdominal pain    Gastroesophageal Reflux       PCP:   Zulema Ha       Referring MD:  Jonah Self  No address on file    HPI:  Laura Fitzpatrick is a 37 y.o. female here for evaluation of GERD and lower abdominal pain with diarrhea.  She has burning in the chest that comes and goes 2-3 days of the weeks.  Has reflux with occasional nausea and vomiting.  This has been a problem for about 1.5 years.  Carbonated beverages and caffeine bother her.  She has regurgitation and frequent belching.  Denies dysphagia.  Saw ENT and placed on Protonix after laryngoscopy revealed LPR.  She has been on it about a year and uses occasional Tums too.  She takes the Protonix in the evening, before dinner.  Continues to have symptoms despite the med.  Tried Prilosec which worked the same as the Protonix.      Her abdominal pain has been present for about a year.  She had surgery for a hernia, but this did the pain returned after.  Diarrhea alternates with normal BM.  When the diarrhea occurs, it is usually watery and can be up to 10 times in a day.  This has woken her up from sleep before.  Stools seem to have mucus in them.  Associated with cramping, bloating and gas.  Denies blood.  Pain not related to BM.  No family history of abdominal pain or GI issues.          ROS:  Constitutional: No fevers, chills, No weight loss, normal appetite  ENT: No congestion, rhinorrhea, or chronic sinus problems  CV: No chest pain or palpitations  Pulm: No cough, No shortness of breath  Ophtho: No vision changes or pain  GI: see HPI  Derm: No rash or lesions  Heme: No lymphadenopathy, No bruising  MSK: No arthritis or joint swelling  : No dysuria, No frequent urination      Medical History:  has a past medical history of Abnormal Pap smear (12 years ago) and Obesity (BMI 30-39.9).    Surgical  "History:  has a past surgical history that includes Appendectomy (2011); Myringotomy w/ tubes; and Inguinal hernia repair (Right, 2017).    Family History: family history includes Anxiety disorder in her mother; Breast cancer in her paternal grandmother; Cancer (age of onset: 58) in her paternal grandmother; Depression in her mother; Diabetes in her father, maternal grandfather, maternal grandmother, mother, and paternal grandmother; Gestational diabetes in her sister; Heart disease in her maternal grandmother; Heart disease (age of onset: 40) in her mother; Hypertension in her father; Kidney disease in her father; Obesity in her father and mother..     Social History:  reports that she has never smoked. She has never used smokeless tobacco. She reports that she drinks alcohol. She reports that she does not use drugs.    Review of patient's allergies indicates:  No Known Allergies    Prior to Admission medications    Medication Sig Start Date End Date Taking? Authorizing Provider   pantoprazole (PROTONIX) 40 MG tablet Take 1 tablet (40 mg total) by mouth every evening. 5/19/17 11/15/17 Yes Zulema Ha,    dicyclomine (BENTYL) 10 MG capsule Take 1 capsule (10 mg total) by mouth 3 (three) times daily as needed (abdominal pain). 5/29/17 6/28/17  Lucho Coker MD       Objective Findings:  Vital Signs:  /78   Pulse 86   Ht 5' 1" (1.549 m)   Wt 90.5 kg (199 lb 8.3 oz)   LMP 04/23/2017 (Exact Date)   BMI 37.70 kg/m²   Body mass index is 37.7 kg/m².    Physical Exam:  General Appearance:  Well appearing in no acute distress, appears stated age  Head:  Normocephalic, atraumatic  Eyes:  No scleral icterus or pallor, EOMI  ENT:  Neck supple, moist mucosa; OP clear  Lungs:  CTA bilaterally in anterior and posterior fields, no wheezes, no crackles; no dullness  Heart:  Regular rate and rhythm, S1, S2 normal, no murmurs heard  Abdomen:  Soft, non distended with positive bowel sounds in all four quadrants. No " hepatosplenomegaly, ascites, or mass; tenderness in the lower abdomen to light palpation with positive Carnett's sign.  Extremities:  No clubbing, cyanosis, or edema      Labs:  Lab Results   Component Value Date    WBC 8.41 05/17/2017    HGB 13.4 05/17/2017    HCT 41.1 05/17/2017    MCV 84 05/17/2017     05/17/2017     Lab Results   Component Value Date     05/17/2017    K 3.8 05/17/2017     05/17/2017    CO2 23 05/17/2017    GLU 84 05/17/2017    BUN 10 05/17/2017    CREATININE 0.8 05/17/2017    CALCIUM 9.5 05/17/2017    PROT 8.4 05/17/2017    ALBUMIN 4.0 05/17/2017    BILITOT 0.4 05/17/2017    ALKPHOS 78 05/17/2017    AST 18 05/17/2017    ALT 20 05/17/2017      Ref. Range 5/19/2017 15:41   H. Pylori Antigen, Stool Latest Ref Range: Not detected  Not detected      Ref. Range 5/17/2017 16:13   TSH Latest Ref Range: 0.400 - 4.000 uIU/mL 1.639     Stool culture negative          Imaging:    CT abd/pelvis with contrast 5/18/17:  The visualized portion of the base of the lungs and visualized portion of the heart are unremarkable.  There is a small hiatal hernia however the stomach is otherwise unremarkable.  The spleen, pancreas, liver, gallbladder, visualized portion of the aorta, and adrenal glands are unremarkable.  The right kidney is unremarkable.  There is a probable punctate left-sided renal stone measuring approximately 0.2 cm.  The bladder is unremarkable.  The uterus has a normal contour.  The bowel is unremarkable.  The appendix is not visualized and may be surgically absent.  The osseous structures are unremarkable.   Impression    Probable punctate left-sided renal stone             Assessment:  Laura Fitzpatrick is a 37 y.o. female with:  1. GERD with regurgitation and has not responded well to Prilosec or Protonix; although, she is not taking the Protonix at optimal timing.  H pylori stool test negative.  2. Lower abdominal pain and positive Carnett's indicting some degree of abdominal  wall pain.  She reports that the pain is not related to bowel movements.  CT scan unrevealing.  3. Intermittent diarrhea with bloating and gas.  Basic stool culture negative.  CT scan unrevealing.       Recommendations/Plan:  1. Ultrasound of abdomen  2. Blood tests for Celiac, CRP and immunoglobulins  3. Stool for parasites and will check for signs of inflammation   4. Bentyl for symptom relief  5. Take Protonix in the morning, 30-60 minutes before breakfast.    Follow-up pending the above.      Order summary:  Orders Placed This Encounter    US Abdomen Complete    TISSUE TRANSGLUTAMINASE (TTG), IGA    IMMUNOGLOBULINS (IGG, IGA, IGM) QUANTITATIVE    Giardia / Cryptosporidum, EIA    Stool Exam-Ova,Cysts,Parasites    C-reactive protein    Calprotectin    dicyclomine (BENTYL) 10 MG capsule         Thank you so much for allowing me to participate in the care of Laura Coker MD

## 2017-06-06 ENCOUNTER — PATIENT MESSAGE (OUTPATIENT)
Dept: GASTROENTEROLOGY | Facility: CLINIC | Age: 37
End: 2017-06-06

## 2017-06-15 ENCOUNTER — PATIENT MESSAGE (OUTPATIENT)
Dept: GASTROENTEROLOGY | Facility: CLINIC | Age: 37
End: 2017-06-15

## 2017-06-15 DIAGNOSIS — K58.0 IRRITABLE BOWEL SYNDROME WITH DIARRHEA: Primary | ICD-10-CM

## 2017-06-15 NOTE — TELEPHONE ENCOUNTER
Spoke with the patient by phone.  Discussed results of testing which were all normal.  No signs of inflammation or infection.  Still with lower abdominal pain, cramping and bloating.  Symptoms worse about 1-2 hours after eating.      This appears most consistent with IBS with diarrhea.  The Bentyl sometimes helps then sometimes doesn't.  I advised her to double the Bentyl.  I will give her a course of Rifaximin as well.      Follow-up in clinic in 4-6 weeks.

## 2017-10-09 ENCOUNTER — HOSPITAL ENCOUNTER (OUTPATIENT)
Dept: RADIOLOGY | Facility: HOSPITAL | Age: 37
Discharge: HOME | End: 2017-10-09
Attending: FAMILY MEDICINE
Payer: COMMERCIAL

## 2017-10-09 ENCOUNTER — OFFICE VISIT (OUTPATIENT)
Dept: FAMILY MEDICINE | Facility: CLINIC | Age: 37
End: 2017-10-09
Payer: COMMERCIAL

## 2017-10-09 VITALS
HEIGHT: 62 IN | WEIGHT: 189.63 LBS | HEART RATE: 88 BPM | BODY MASS INDEX: 34.89 KG/M2 | TEMPERATURE: 98 F | SYSTOLIC BLOOD PRESSURE: 124 MMHG | DIASTOLIC BLOOD PRESSURE: 91 MMHG

## 2017-10-09 DIAGNOSIS — R10.31 RIGHT LOWER QUADRANT ABDOMINAL PAIN: ICD-10-CM

## 2017-10-09 DIAGNOSIS — R10.31 RIGHT LOWER QUADRANT ABDOMINAL PAIN: Primary | ICD-10-CM

## 2017-10-09 LAB
BILIRUB UR QL STRIP: NEGATIVE
CLARITY UR REFRACT.AUTO: CLEAR
COLOR UR AUTO: YELLOW
GLUCOSE UR QL STRIP: NEGATIVE
HGB UR QL STRIP: NEGATIVE
KETONES UR QL STRIP: NEGATIVE
LEUKOCYTE ESTERASE UR QL STRIP: NEGATIVE
NITRITE UR QL STRIP: NEGATIVE
PH UR STRIP: 6 [PH] (ref 5–8)
PROT UR QL STRIP: NEGATIVE
SP GR UR STRIP: 1.01 (ref 1–1.03)
URN SPEC COLLECT METH UR: NORMAL
UROBILINOGEN UR STRIP-ACNC: NEGATIVE EU/DL

## 2017-10-09 PROCEDURE — 81003 URINALYSIS AUTO W/O SCOPE: CPT

## 2017-10-09 PROCEDURE — 99999 PR PBB SHADOW E&M-EST. PATIENT-LVL III: CPT | Mod: PBBFAC,,, | Performed by: FAMILY MEDICINE

## 2017-10-09 PROCEDURE — 74000 XR ABDOMEN AP 1 VIEW: CPT | Mod: TC,PO

## 2017-10-09 PROCEDURE — 99214 OFFICE O/P EST MOD 30 MIN: CPT | Mod: S$GLB,,, | Performed by: FAMILY MEDICINE

## 2017-10-09 PROCEDURE — 74000 XR ABDOMEN AP 1 VIEW: CPT | Mod: 26,,, | Performed by: RADIOLOGY

## 2017-10-09 RX ORDER — TRAMADOL HYDROCHLORIDE 50 MG/1
50 TABLET ORAL EVERY 6 HOURS PRN
Qty: 25 TABLET | Refills: 0 | Status: SHIPPED | OUTPATIENT
Start: 2017-10-09 | End: 2017-10-19

## 2017-10-09 NOTE — PROGRESS NOTES
Subjective:     Patient ID: Laura Fitzpatrick is a 37 y.o. female.    Chief Complaint: abdominal pain  HPI abdominal pain is back again ( or more accurately has flared again). She states the pain never fully resolved it only went down to a level PS 1-2 but then in the last   Last week she got worse again(wednesday) PS level 7 now(sometimes up to 8-9-to the point where she is crying). Its worse at end of the day when she is doing more. Aggravated by bending over or riding in a car.   She has some decreased appetite in past few days and maybe slightly nauseas this am. She has some mild constipation and had a tiny amount of bright red bleeding with that that she thought was just due to struggling to have a bm.   No dysuria no burping but she is passing a lot og gas (but states that is normal for her). She have inguinal hernia surgery in mid January of this year and she seemed to think the pain was a little better after that.   She saw GI in May and had abd us (after a Ct of and and pelvis). Both mostly nl-some fatty tissue in liver and perhaps a small left sided kidney stones  Review of Systems   Constitutional: Negative for fever.   Gastrointestinal: Positive for abdominal pain and nausea. Negative for constipation, diarrhea and vomiting.   Genitourinary: Negative for dysuria, frequency and hematuria.   Musculoskeletal: Positive for arthralgias. Negative for myalgias.   Neurological: Positive for headaches.     per HPI  Objective:      Physical Exam   Constitutional: She appears well-developed and well-nourished.   Cardiovascular: Normal rate, regular rhythm, normal heart sounds and intact distal pulses.    Pulmonary/Chest: Effort normal and breath sounds normal.   Abdominal: Soft. There is tenderness (acute tenderness of RT inguinal region).   Decreased bowel sounds   Psychiatric: She has a normal mood and affect. Her behavior is normal. Judgment and thought content normal.   Nursing note and vitals reviewed.       Assessment:     Laura was seen today for abdominal pain.    Diagnoses and all orders for this visit:    Right lower quadrant abdominal pain  -     X-Ray Abdomen AP 1 View; Future  -     CBC auto differential; Future  -     Comprehensive metabolic panel; Future  -     Urinalysis; Future  -     Ambulatory referral to General Surgery    Other orders  -     tramadol (ULTRAM) 50 mg tablet; Take 1 tablet (50 mg total) by mouth every 6 (six) hours as needed for Pain.

## 2017-10-24 ENCOUNTER — OFFICE VISIT (OUTPATIENT)
Dept: OBSTETRICS AND GYNECOLOGY | Facility: CLINIC | Age: 37
End: 2017-10-24
Payer: COMMERCIAL

## 2017-10-24 VITALS
HEIGHT: 61 IN | SYSTOLIC BLOOD PRESSURE: 106 MMHG | WEIGHT: 189.63 LBS | DIASTOLIC BLOOD PRESSURE: 70 MMHG | BODY MASS INDEX: 35.8 KG/M2

## 2017-10-24 DIAGNOSIS — Z01.419 WELL WOMAN EXAM WITH ROUTINE GYNECOLOGICAL EXAM: Primary | ICD-10-CM

## 2017-10-24 DIAGNOSIS — R10.2 PELVIC PAIN IN FEMALE: ICD-10-CM

## 2017-10-24 PROCEDURE — 88141 CYTOPATH C/V INTERPRET: CPT | Mod: ,,, | Performed by: PATHOLOGY

## 2017-10-24 PROCEDURE — 99395 PREV VISIT EST AGE 18-39: CPT | Mod: S$GLB,,, | Performed by: NURSE PRACTITIONER

## 2017-10-24 PROCEDURE — 87624 HPV HI-RISK TYP POOLED RSLT: CPT

## 2017-10-24 PROCEDURE — 99999 PR PBB SHADOW E&M-EST. PATIENT-LVL III: CPT | Mod: PBBFAC,,, | Performed by: NURSE PRACTITIONER

## 2017-10-24 PROCEDURE — 88175 CYTOPATH C/V AUTO FLUID REDO: CPT | Performed by: PATHOLOGY

## 2017-10-24 RX ORDER — TRAMADOL HYDROCHLORIDE 50 MG/1
TABLET ORAL
Refills: 0 | COMMUNITY
Start: 2017-10-09 | End: 2018-07-02

## 2017-10-24 NOTE — PROGRESS NOTES
HISTORY OF PRESENT ILLNESS:    Laura Fitzpatrick is a 37 y.o. female, , Patient's last menstrual period was 10/02/2017 (exact date).,  presents for a routine exam and c/o chronic RLQ pain.   -Had hernia surgery in 2017, but then a few months later the pain came back.  -She did not follow up with her surgeon.   -Describes pain as cramping, sometime stabbing, worse with position change and sometimes worse midcycle and with her period.  -Has had constipation and gas, but this is much better.   -Tramadol helps some.   -Denies associated fever, UTI sx, vaginal discharge, AUB, GI sx.  -Pain does not wake her, not worse with exercise, diet.    NOTE FROM DR MOTLEY 10-9-17:  HPI abdominal pain is back again ( or more accurately has flared again). She states the pain never fully resolved it only went down to a level PS 1-2 but then in the last   Last week she got worse again(wednesday) PS level 7 now(sometimes up to 8-9-to the point where she is crying). Its worse at end of the day when she is doing more. Aggravated by bending over or riding in a car.   She has some decreased appetite in past few days and maybe slightly nauseas this am. She has some mild constipation and had a tiny amount of bright red bleeding with that that she thought was just due to struggling to have a bm.   No dysuria no burping but she is passing a lot og gas (but states that is normal for her). She have inguinal hernia surgery in mid January of this year and she seemed to think the pain was a little better after that.   She saw GI in May and had abd us (after a Ct of and and pelvis). Both mostly nl-some fatty tissue in liver and perhaps a small left sided kidney stones    Past Medical History:   Diagnosis Date    Abnormal Pap smear 12 years ago    Cryotherapy    Cervical radiculopathy 2015    Enlarged thyroid gland 2015    GERD (gastroesophageal reflux disease)     Obesity (BMI 30-39.9)     Right-sided carotid artery disease  2/8/2016       Past Surgical History:   Procedure Laterality Date    APPENDECTOMY  2011    INGUINAL HERNIA REPAIR Right 2017    MYRINGOTOMY W/ TUBES         MEDICATIONS AND ALLERGIES:    Current Outpatient Prescriptions:     pantoprazole (PROTONIX) 40 MG tablet, Take 1 tablet (40 mg total) by mouth every evening., Disp: 30 tablet, Rfl: 5    traMADol (ULTRAM) 50 mg tablet, TK 1 T PO Q 6 H PRN P, Disp: , Rfl: 0    Review of patient's allergies indicates:  No Known Allergies    Family History   Problem Relation Age of Onset    Heart disease Mother 40    Diabetes Mother     Depression Mother     Anxiety disorder Mother     Obesity Mother     Hypertension Father     Diabetes Father     Obesity Father     Kidney disease Father     Heart disease Maternal Grandmother     Diabetes Maternal Grandmother     Diabetes Maternal Grandfather     Cancer Paternal Grandmother 58     Breast    Diabetes Paternal Grandmother     Breast cancer Paternal Grandmother     Gestational diabetes Sister     Stroke Neg Hx     Colon cancer Neg Hx     Ovarian cancer Neg Hx        Social History     Social History    Marital status: Single     Spouse name: N/A    Number of children: N/A    Years of education: N/A     Occupational History    Speech pathologist       Social History Main Topics    Smoking status: Never Smoker    Smokeless tobacco: Never Used    Alcohol use 0.0 oz/week      Comment: social    Drug use: No    Sexual activity: Yes     Partners: Male     Other Topics Concern    Not on file     Social History Narrative    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     COMPREHENSIVE GYN HISTORY:  PAP History: Denies abnormal Pap: in her 20's, Treatment: Cryo and neg paps since. LAST PAP 3-3-15 NORMAL.  Infection History: Denies STDs. Denies PID.  Benign History: Denies uterine fibroids. Denies ovarian cysts. Denies endometriosis. Denies other conditions.  Cancer History: Denies  "cervical cancer. Denies uterine cancer or hyperplasia. Denies ovarian cancer. Denies vulvar cancer or pre-cancer. Denies vaginal cancer or pre-cancer. Denies breast cancer. Denies colon cancer.  Sexual Activity History: Reports currently being sexually active  Menstrual History: Monthly. Mod then light flow.   Dysmenorrhea History: Reports mild dysmenorrhea.   Contraception: Condoms.     ROS:  GENERAL: No weight changes. No swelling. No fatigue. No fever.  CARDIOVASCULAR: No chest pain. No shortness of breath. No leg cramps.   NEUROLOGICAL: No headaches. No vision changes.  BREASTS: No pain. No lumps. No discharge.  ABDOMEN: + RLQ PAIN.  No nausea. No vomiting. No diarrhea. No constipation.  REPRODUCTIVE: No abnormal bleeding.   VULVA: No pain. No lesions. No itching.  VAGINA: No relaxation. No itching. No odor. No discharge. No lesions.  URINARY: No incontinence. No nocturia. No frequency. No dysuria.    /70   Ht 5' 1" (1.549 m)   Wt 86 kg (189 lb 9.5 oz)   LMP 10/02/2017 (Exact Date)   BMI 35.82 kg/m²     PE:  APPEARANCE: Well nourished, well developed, in no acute distress.  AFFECT: WNL, alert and oriented x 3.  SKIN: No acne or hirsutism.  NECK: Neck symmetric, without masses or thyromegaly.  NODES: No inguinal, cervical, axillary or femoral lymph node enlargement.  CHEST: Good respiratory effort.   ABDOMEN: Soft. There is RLQ TENDERNESS OVER THE INGUINAL HERNIA SCAR AND ABOVE THE SCAR without rebound or masses. OBESE.  BREASTS: Symmetrical, no skin changes, visible lesions, palpable masses or nipple discharge bilaterally.  PELVIC: External female genitalia without lesions.  Female hair distribution. Adequate perineal body, Normal urethral meatus. Vagina moist and well rugated without lesions or discharge.  No significant cystocele or rectocele present. Cervix pink without lesions, discharge or tenderness. Uterus is 4-6 week size, regular, mobile and nontender. Left adnexa without masses or tenderness. "  RIGHT ADNEXA TENDER without masses.   EXTREMITIES: No edema    DIAGNOSIS:  1. Well woman exam with routine gynecological exam    2. Chronic pelvic pain in female        PLAN:    Orders Placed This Encounter    US Pelvis Comp with Transvag NON-OB (xpd    Liquid-based pap smear, screening   Declined STD tests    COUNSELING:  The patient was counseled today on:  -gyn and non gyn etiologies of CPP;  -to keep a pain diary to determine if cyclic, diet related, exercise related, stress related, etc;  -A.C.S. Pap and pelvic exam guidelines (pap every 3 years);  -to follow up with her PCP for other health maintenance.    FOLLOW-UP with me pending test results and annually.

## 2017-10-25 ENCOUNTER — HOSPITAL ENCOUNTER (OUTPATIENT)
Dept: RADIOLOGY | Facility: OTHER | Age: 37
Discharge: HOME | End: 2017-10-25
Attending: NURSE PRACTITIONER
Payer: COMMERCIAL

## 2017-10-25 DIAGNOSIS — R10.2 PELVIC PAIN IN FEMALE: ICD-10-CM

## 2017-10-25 PROCEDURE — 76830 TRANSVAGINAL US NON-OB: CPT | Mod: 26,,, | Performed by: RADIOLOGY

## 2017-10-25 PROCEDURE — 76856 US EXAM PELVIC COMPLETE: CPT | Mod: 26,,, | Performed by: RADIOLOGY

## 2017-10-25 PROCEDURE — 76856 US EXAM PELVIC COMPLETE: CPT | Mod: TC

## 2017-11-02 LAB
HPV16 AG SPEC QL: NEGATIVE
HPV16+18+H RISK 12 DNA CVX-IMP: NEGATIVE
HPV18 DNA SPEC QL NAA+PROBE: NEGATIVE

## 2018-06-01 DIAGNOSIS — Z00.00 ANNUAL PHYSICAL EXAM: Primary | ICD-10-CM

## 2018-07-02 ENCOUNTER — TELEPHONE (OUTPATIENT)
Dept: FAMILY MEDICINE | Facility: CLINIC | Age: 38
End: 2018-07-02

## 2018-07-02 ENCOUNTER — OFFICE VISIT (OUTPATIENT)
Dept: FAMILY MEDICINE | Facility: CLINIC | Age: 38
End: 2018-07-02
Payer: COMMERCIAL

## 2018-07-02 VITALS
RESPIRATION RATE: 20 BRPM | HEIGHT: 62 IN | WEIGHT: 184.31 LBS | DIASTOLIC BLOOD PRESSURE: 72 MMHG | SYSTOLIC BLOOD PRESSURE: 100 MMHG | BODY MASS INDEX: 33.92 KG/M2 | TEMPERATURE: 98 F

## 2018-07-02 DIAGNOSIS — E01.0 THYROMEGALY: Primary | ICD-10-CM

## 2018-07-02 DIAGNOSIS — Z00.00 ROUTINE GENERAL MEDICAL EXAMINATION AT A HEALTH CARE FACILITY: Primary | ICD-10-CM

## 2018-07-02 DIAGNOSIS — R13.10 DYSPHAGIA, UNSPECIFIED TYPE: ICD-10-CM

## 2018-07-02 PROCEDURE — 3008F BODY MASS INDEX DOCD: CPT | Mod: CPTII,S$GLB,, | Performed by: FAMILY MEDICINE

## 2018-07-02 PROCEDURE — 99214 OFFICE O/P EST MOD 30 MIN: CPT | Mod: S$GLB,,, | Performed by: FAMILY MEDICINE

## 2018-07-02 PROCEDURE — 99999 PR PBB SHADOW E&M-EST. PATIENT-LVL IV: CPT | Mod: PBBFAC,,, | Performed by: FAMILY MEDICINE

## 2018-07-02 NOTE — TELEPHONE ENCOUNTER
----- Message from Toyin Maddox sent at 7/2/2018 10:27 AM CDT -----  Contact: pt  Doctor appointment and lab have been scheduled.  Please link lab orders to the lab appointment.  Date of doctor appointment:  8/21  Physical or EP:  epp  Date of lab appointment:  8/14  Comments:

## 2018-07-02 NOTE — PROGRESS NOTES
Subjective:       Patient ID: Laura Fitzpatrick is a 38 y.o. female.    Chief Complaint: difficulty swallowing foods  pt can swallow liquids fairly well, but has to try to swallow solids several times  Feels a click in her throat and the food gets stopped.  HPI see above  Review of Systems   Constitutional: Negative.    HENT: Negative.    Eyes: Negative.    Respiratory: Negative.    Cardiovascular: Negative.    Gastrointestinal:        Difficulty swallowing solids   Endocrine: Negative.    Genitourinary: Negative.    Musculoskeletal: Negative.    Skin: Negative.    Allergic/Immunologic: Negative.    Neurological: Negative.    Hematological: Negative.    Psychiatric/Behavioral: Negative.        Objective:      Physical Exam   Constitutional: She is oriented to person, place, and time. She appears well-developed and well-nourished. No distress.   HENT:   Head: Normocephalic and atraumatic.   Right Ear: External ear normal.   Left Ear: External ear normal.   Nose: Nose normal.   Mouth/Throat: Oropharynx is clear and moist.   Eyes: Conjunctivae and EOM are normal. Pupils are equal, round, and reactive to light.   Neck: Normal range of motion. Neck supple. No JVD present. Thyromegaly present.   Cardiovascular: Normal rate, regular rhythm, normal heart sounds and intact distal pulses.  Exam reveals no gallop and no friction rub.    No murmur heard.  Pulmonary/Chest: Effort normal and breath sounds normal. No respiratory distress. She has no wheezes. She has no rales. She exhibits no tenderness.   Abdominal: Soft. Bowel sounds are normal. She exhibits no distension and no mass. There is no tenderness. There is no rebound and no guarding. No hernia.   Neurological: She is alert and oriented to person, place, and time. She displays normal reflexes. No cranial nerve deficit or sensory deficit. She exhibits normal muscle tone. Coordination normal.   Skin: Skin is warm and dry. No rash noted. She is not diaphoretic. No  erythema. No pallor.   Psychiatric: She has a normal mood and affect. Her behavior is normal. Judgment and thought content normal.   Nursing note and vitals reviewed.      Assessment:       1. Thyromegaly    2. Dysphagia, unspecified type        Plan:        US Soft Tissue Head Neck Thyroid         SLP video swallow         Fl Modified Barium Swallow Speech         Ambulatory consult to Endocrine Surgery          Will contact pt with results when available

## 2018-07-02 NOTE — PATIENT INSTRUCTIONS
Dysphagia Diet: Level 2 (Mechanically Altered)  A level 2 dysphagia diet is a special eating plan. Your healthcare provider may tell you to use it if you have mild to moderate dysphagia and are able to do some chewing.  What is a dysphagia diet?  When you have dysphagia, you have trouble swallowing. You are also at risk for aspiration. Aspiration is when food or liquid enters the lungs by accident. It can cause pneumonia and other problems. A dysphagia diet can help prevent aspiration.  The foods you eat can affect your ability to swallow. For example, soft foods are easier to swallow than hard foods. A dysphagia diet plan has 3 levels. Each level is based on how serious a persons dysphagia is. A level 2 diet is the intermediate level. People on this diet should eat moist and soft-textured foods that are easy to chew. They can also eat pureed, pudding-like foods. They should avoid foods with coarse textures.  What can you eat?  If you are on a level 2 dysphagia diet, there are certain foods you can and cant eat. Make sure you follow all your healthcare providers instructions. Even eating 1 food that is not approved can greatly raise your risk for aspiration.  Foods you can eat:  · Pureed breads (also called pre-gelled breads)  · Cooked cereals with little texture, such as oatmeal, or slightly moistened dry cereals with little texture, such as corn flakes  · Smooth puddings, custards, yogurts, and pureed desserts  · Soft bananas, pureed fruits, and most canned and cooked fruits without seeds or skins  · Tender meat moistened with gravy, cut into very small pieces  · Cottage cheese, tofu, and well-cooked beans  · Poached, scrambled, or soft-cooked eggs  · Well-cooked potatoes, noodles, and dumplings  · Mashed potatoes  · Soups with easily chewed pieces  · Soft, well-cooked vegetables, cut into very small pieces  · Souffles  Foods to avoid:  · Other non-pureed dry breads  · Very coarse cereals  · Coarse, dry cakes  and cookies, or any desserts with added dried fruits, seeds, or nuts  · Fresh or frozen whole fruit or cooked fruit with skin or seeds, or dried fruit  · Canned pineapple  · Dry or tough meats  · Soups with large chunks  · Raw or barely cooked vegetables  · Seeds, nuts, or chewy candies  During and after eating  While eating, it may help to sit upright. You may need support pillows to get into the best position. It may also help to have few distractions while drinking. Changing between solid food and liquids may also help your swallowing.  Stay upright for at least 30 minutes after eating. This can help reduce the risk for aspiration. Keep watch for symptoms of aspiration such as:  · Coughing or wheezing during or right after eating  · Too much saliva  · Shortness of breath or tiredness while eating  · A wet-sounding voice during or after eating or drinking  · Fever 30 to 60 minutes after eating  Tip  Take the time to make meals that look, smell, and taste good. Add seasonings to your food. Set the table. Even though you cant have certain foods, you can still enjoy eating.   Drinking liquids  Ask your healthcare provider about what kinds of liquids are safe for you on a level 2 dysphagia diet. Some people can drink thin liquids, but others should not. If you cant have thin liquids, make sure your liquids are thickened. Your healthcare provider will give you more information about how to manage the thickness of liquids.  While youre on a dysphagia diet  · Follow all instructions about what food and drink you can have.  · Do swallowing exercises as advised.  · Do not change your food or liquids, even if your swallowing gets better. Talk with your healthcare provider first.  · Tell all healthcare providers and caregivers that you are on a dysphagia diet. Explain which foods and liquids you can and cant have.  How long a dysphagia diet is needed?  Your healthcare team will keep track of how well you are swallowing.  You may need follow-up tests such as a fiberoptic endoscopic evaluation of swallowing (FEES) test. If your swallowing gets better, you may be able to change your diet. Many people who have dysphagia because of a stroke find that their swallowing problems improve with time and therapy. If your swallowing gets better, you may be able to change to a less restrictive diet. If your swallowing gets worse, you may need to change to a level 1 diet for a period of time. Or you may need other methods of getting nutrition for a period of time. For example, you may need a feeding tube.  Call 911  Call 911 if you have trouble breathing during or after eating.   When to call your healthcare provider  Call your healthcare provider right away if you have any of these:  · Your trouble swallowing gets worse  · Unintended weight loss  · Food comes back up into your mouth  · Vomiting  · A wet-sounding voice after eating or drinking  Date Last Reviewed: 3/1/2017  © 7559-4158 [employer]. [address] [place] [address], [place] [address]. All rights reserved. This information is not intended as a substitute for professional medical care. Always follow your healthcare professional's instructions.

## 2018-07-02 NOTE — TELEPHONE ENCOUNTER
Patient scheduled below appt,   Message     Appointment For: UBALDO STEPHENS ([id number])   Visit Type: MYCHART FOLLOWUP/OFFICE VISIT (2382)      7/2/2018    10:40 AM  20 mins.  Linnea Easton MD [facility]      Patient Comments:   Existing Patient   Difficulty swallowing     Attempted to contact patient concerning scheduled appt voicemail left to return our call.

## 2018-07-05 ENCOUNTER — HOSPITAL ENCOUNTER (OUTPATIENT)
Dept: RADIOLOGY | Facility: HOSPITAL | Age: 38
Discharge: HOME | End: 2018-07-05
Attending: FAMILY MEDICINE
Payer: COMMERCIAL

## 2018-07-05 DIAGNOSIS — E01.0 THYROMEGALY: ICD-10-CM

## 2018-07-05 PROCEDURE — 76536 US EXAM OF HEAD AND NECK: CPT | Mod: 26,,, | Performed by: RADIOLOGY

## 2018-07-05 PROCEDURE — 76536 US EXAM OF HEAD AND NECK: CPT | Mod: TC

## 2018-07-24 ENCOUNTER — TELEPHONE (OUTPATIENT)
Dept: RADIOLOGY | Facility: HOSPITAL | Age: 38
End: 2018-07-24

## 2018-07-25 ENCOUNTER — CLINICAL SUPPORT (OUTPATIENT)
Dept: SPEECH THERAPY | Facility: HOSPITAL | Age: 38
End: 2018-07-25
Attending: FAMILY MEDICINE
Payer: COMMERCIAL

## 2018-07-25 ENCOUNTER — HOSPITAL ENCOUNTER (OUTPATIENT)
Dept: RADIOLOGY | Facility: HOSPITAL | Age: 38
Discharge: HOME | End: 2018-07-25
Attending: FAMILY MEDICINE
Payer: COMMERCIAL

## 2018-07-25 DIAGNOSIS — R13.12 DYSPHAGIA, OROPHARYNGEAL: Primary | ICD-10-CM

## 2018-07-25 DIAGNOSIS — R13.10 DYSPHAGIA, UNSPECIFIED TYPE: ICD-10-CM

## 2018-07-25 PROCEDURE — G8996 SWALLOW CURRENT STATUS: HCPCS | Mod: CH | Performed by: SPEECH-LANGUAGE PATHOLOGIST

## 2018-07-25 PROCEDURE — G8997 SWALLOW GOAL STATUS: HCPCS | Mod: CH | Performed by: SPEECH-LANGUAGE PATHOLOGIST

## 2018-07-25 PROCEDURE — G8998 SWALLOW D/C STATUS: HCPCS | Mod: CH | Performed by: SPEECH-LANGUAGE PATHOLOGIST

## 2018-07-25 PROCEDURE — 74230 X-RAY XM SWLNG FUNCJ C+: CPT | Mod: 26,,, | Performed by: RADIOLOGY

## 2018-07-25 PROCEDURE — 92611 MOTION FLUOROSCOPY/SWALLOW: CPT | Mod: GN | Performed by: SPEECH-LANGUAGE PATHOLOGIST

## 2018-07-25 PROCEDURE — 74230 X-RAY XM SWLNG FUNCJ C+: CPT | Mod: TC

## 2018-07-26 NOTE — PROGRESS NOTES
"Referring provider: Dr. Linnea Stevens*  Reason for visit:  Modified barium swallow study (CPT 77854)    Report Summary   --Date: 07/26/2018  --Purpose: to evaluate anatomy and physiology of the oropharyngeal swallow, to determine effectiveness of rehabilitation strategies, and to determine diet consistency and intervention recommendations.   --Diet recommendations: regular consistency and thin liquids as tolerated     Subjective / History    Laura Fitzpatrick is a 38 y.o. female referred by Dr. Easton for Modified Barium Swallow Study (30388).  She is currently on a regular diet.  The patient complains of coughing with water after the swallow and a painful "clicking" feeling, which is inconsistent.  She has self-initiated a double swallow with solids and has reportedly been taking smaller bites.  She denies any nasal regurgitation, recent unintentional weigh loss or pneumonia.  She says that she was on reflux medications, but stopped taking them, as her reflux has improved in the past 6 months (since eliminating fast food from her diet).    Past Medical History:   Diagnosis Date    Abnormal Pap smear 12 years ago    Cryotherapy    Cervical radiculopathy 4/29/2015    Enlarged thyroid gland 1/26/2015    GERD (gastroesophageal reflux disease)     Obesity (BMI 30-39.9)     Right-sided carotid artery disease 2/8/2016     No current outpatient prescriptions on file prior to visit.     No current facility-administered medications on file prior to visit.        Objective   Lateral videofluorographic views were obtained. The radiologist and speech pathologist were present for the entire procedure.     Consistencies assessed / method of administration  thin liquid/tsp, thin liquid/cup, thin liquid/sequential cup sips, pudding/tsp, cracker and barium tablet    Oral phase  - Grossly WNL with adequate/timely lip closure, tongue control during bolus hold, bolus preparation, and bolus transport/lingual motion.  " Little to no oral residue.   Pharyngeal phase  - timely initiation  - adequate soft palate elevation  - adequate laryngeal elevation and anterior hyoid excursion  - adequate epiglottic movement  - mildly reduce laryngeal vestibular closure: flash penetration x2 on sequential cup sips of thin liquids  - midly reduced pharyngeal stripping wave  - adequate PE segment opening  - adequate tongue base retraction  - no pharyngeal residue       Rosenbek Penetration-Aspiration Scale (Rosenvernellk et al 1996)  Best Trial: 1. Contrast does not enter airway.   Worst Trial: 2. Contrast enters airway but remains above TVF, no residue.       Assessment / Impressions   The results of this MBSS indicate that patient's swallowing function is grossly WLN with noted flash penetration of thin liquids..      Recommendations / POC   -Diet: recommend regular consistency and thin liquids as tolerated   -Contact clinician or referring provider for repeat MBSS if swallowing status changes or worsens      G-Codes for Swallowing  Current status:  - CH  Projected status:   - CH  Discharge status:   -

## 2018-08-04 NOTE — PLAN OF CARE
Assessment / Impressions   The results of this MBSS indicate that patient's swallowing function is grossly WLN with noted flash penetration of thin liquids..       Recommendations / POC   -Diet: recommend regular consistency and thin liquids as tolerated   -Contact clinician or referring provider for repeat MBSS if swallowing status changes or worsens

## 2018-08-15 ENCOUNTER — LAB VISIT (OUTPATIENT)
Dept: LAB | Facility: HOSPITAL | Age: 38
End: 2018-08-15
Attending: FAMILY MEDICINE
Payer: COMMERCIAL

## 2018-08-15 DIAGNOSIS — Z00.00 ANNUAL PHYSICAL EXAM: ICD-10-CM

## 2018-08-15 LAB
ALBUMIN SERPL BCP-MCNC: 4.1 G/DL
ALP SERPL-CCNC: 78 U/L
ALT SERPL W/O P-5'-P-CCNC: 30 U/L
ANION GAP SERPL CALC-SCNC: 7 MMOL/L
AST SERPL-CCNC: 22 U/L
BASOPHILS # BLD AUTO: 0.02 K/UL
BASOPHILS NFR BLD: 0.4 %
BILIRUB SERPL-MCNC: 0.7 MG/DL
BUN SERPL-MCNC: 14 MG/DL
CALCIUM SERPL-MCNC: 9.5 MG/DL
CHLORIDE SERPL-SCNC: 104 MMOL/L
CHOLEST SERPL-MCNC: 153 MG/DL
CHOLEST/HDLC SERPL: 3.8 {RATIO}
CO2 SERPL-SCNC: 23 MMOL/L
CREAT SERPL-MCNC: 0.8 MG/DL
DIFFERENTIAL METHOD: ABNORMAL
EOSINOPHIL # BLD AUTO: 0.1 K/UL
EOSINOPHIL NFR BLD: 2 %
ERYTHROCYTE [DISTWIDTH] IN BLOOD BY AUTOMATED COUNT: 13.8 %
EST. GFR  (AFRICAN AMERICAN): >60 ML/MIN/1.73 M^2
EST. GFR  (NON AFRICAN AMERICAN): >60 ML/MIN/1.73 M^2
GLUCOSE SERPL-MCNC: 84 MG/DL
HCT VFR BLD AUTO: 42.8 %
HDLC SERPL-MCNC: 40 MG/DL
HDLC SERPL: 26.1 %
HGB BLD-MCNC: 13.6 G/DL
IMM GRANULOCYTES # BLD AUTO: 0.01 K/UL
IMM GRANULOCYTES NFR BLD AUTO: 0.2 %
LDLC SERPL CALC-MCNC: 102.4 MG/DL
LYMPHOCYTES # BLD AUTO: 1.3 K/UL
LYMPHOCYTES NFR BLD: 26.1 %
MCH RBC QN AUTO: 28.1 PG
MCHC RBC AUTO-ENTMCNC: 31.8 G/DL
MCV RBC AUTO: 88 FL
MONOCYTES # BLD AUTO: 0.7 K/UL
MONOCYTES NFR BLD: 13.3 %
NEUTROPHILS # BLD AUTO: 2.9 K/UL
NEUTROPHILS NFR BLD: 58 %
NONHDLC SERPL-MCNC: 113 MG/DL
NRBC BLD-RTO: 0 /100 WBC
PLATELET # BLD AUTO: 208 K/UL
PMV BLD AUTO: 12.1 FL
POTASSIUM SERPL-SCNC: 4 MMOL/L
PROT SERPL-MCNC: 8.1 G/DL
RBC # BLD AUTO: 4.84 M/UL
SODIUM SERPL-SCNC: 134 MMOL/L
TRIGL SERPL-MCNC: 53 MG/DL
TSH SERPL DL<=0.005 MIU/L-ACNC: 1.38 UIU/ML
WBC # BLD AUTO: 5.02 K/UL

## 2018-08-15 PROCEDURE — 80061 LIPID PANEL: CPT

## 2018-08-15 PROCEDURE — 36415 COLL VENOUS BLD VENIPUNCTURE: CPT | Mod: PO

## 2018-08-15 PROCEDURE — 80053 COMPREHEN METABOLIC PANEL: CPT

## 2018-08-15 PROCEDURE — 84443 ASSAY THYROID STIM HORMONE: CPT

## 2018-08-15 PROCEDURE — 85025 COMPLETE CBC W/AUTO DIFF WBC: CPT

## 2018-08-21 ENCOUNTER — OFFICE VISIT (OUTPATIENT)
Dept: FAMILY MEDICINE | Facility: CLINIC | Age: 38
End: 2018-08-21
Payer: COMMERCIAL

## 2018-08-21 VITALS
BODY MASS INDEX: 33.71 KG/M2 | SYSTOLIC BLOOD PRESSURE: 120 MMHG | WEIGHT: 178.56 LBS | DIASTOLIC BLOOD PRESSURE: 74 MMHG | TEMPERATURE: 99 F | HEART RATE: 74 BPM | HEIGHT: 61 IN

## 2018-08-21 DIAGNOSIS — R07.0 IMPAIRED SWALLOWING ASSOCIATED WITH THROAT PAIN: ICD-10-CM

## 2018-08-21 DIAGNOSIS — R13.10 IMPAIRED SWALLOWING ASSOCIATED WITH THROAT PAIN: ICD-10-CM

## 2018-08-21 DIAGNOSIS — Z00.00 ROUTINE GENERAL MEDICAL EXAMINATION AT A HEALTH CARE FACILITY: Primary | ICD-10-CM

## 2018-08-21 DIAGNOSIS — E66.9 OBESITY (BMI 30-39.9): ICD-10-CM

## 2018-08-21 PROCEDURE — 99999 PR PBB SHADOW E&M-EST. PATIENT-LVL III: CPT | Mod: PBBFAC,,, | Performed by: FAMILY MEDICINE

## 2018-08-21 PROCEDURE — 99395 PREV VISIT EST AGE 18-39: CPT | Mod: S$GLB,,, | Performed by: FAMILY MEDICINE

## 2018-08-21 NOTE — PROGRESS NOTES
"Subjective:     Patient ID: Laura Fitzpatrick is a 38 y.o. female.    Chief Complaint: Annual Exam  she is following a low carb diet. She gave up fast food and she has lots over 10 lbs  HPI  Review of Systems   Constitutional: Negative for activity change and unexpected weight change.   HENT: Positive for trouble swallowing (sometimes when she swallows she hears a click about her hyoid bone (she is a speech pathologist) and she feels a popping sensation.  she feels like things get stuck there ). Negative for hearing loss and rhinorrhea.         She had a swallowing study that was normal.    Eyes: Negative for discharge.   Respiratory: Positive for wheezing (occasional high pitched wheezing when exercising  . ). Negative for chest tightness.    Cardiovascular: Negative for chest pain and palpitations.   Gastrointestinal: Negative for blood in stool, constipation, diarrhea and vomiting.        Occasional rt inguinal pain where she had hernia repaired but she is not aware of a bulge. It is not terribly bothersome   Endocrine: Negative for polydipsia and polyuria.   Genitourinary: Negative for difficulty urinating, dysuria, hematuria and menstrual problem.   Musculoskeletal: Negative for arthralgias, joint swelling and neck pain.   Neurological: Negative for weakness and headaches.   Psychiatric/Behavioral: Negative for confusion and dysphoric mood.       Objective:      Physical Exam   Constitutional: She is oriented to person, place, and time. She appears well-developed and well-nourished.   HENT:   Head: Normocephalic and atraumatic.   Right Ear: External ear normal.   Left Ear: External ear normal.   Nose: Nose normal.   Mouth/Throat: Oropharynx is clear and moist.   Eyes: Conjunctivae and EOM are normal. Pupils are equal, round, and reactive to light.   Neck: Normal range of motion. Neck supple. No JVD present. No thyromegaly present.   There is some "hypermobility of hyoid bone with swallowing"   Cardiovascular: " Normal rate, regular rhythm, normal heart sounds and intact distal pulses.   No murmur heard.  Pulmonary/Chest: Effort normal and breath sounds normal.   Abdominal: Soft. Bowel sounds are normal. She exhibits no mass. There is no tenderness.   Musculoskeletal: Normal range of motion. She exhibits no edema.   Lymphadenopathy:     She has cervical adenopathy (1 small 8 mm node of rt posterior mid  neck chain - mobile, nontender, ).   Neurological: She is alert and oriented to person, place, and time. She has normal reflexes.   Skin: Skin is warm and dry.   Psychiatric: She has a normal mood and affect. Her behavior is normal. Judgment and thought content normal.   Vitals reviewed.      Assessment:     Laura was seen today for annual exam.    Diagnoses and all orders for this visit:    Routine general medical examination at a health care facility    Impaired swallowing associated with throat pain  -     Ambulatory referral to ENT    Obesity (BMI 30-39.9)  Encourage low carb diet and regular exercise

## 2018-08-22 ENCOUNTER — OFFICE VISIT (OUTPATIENT)
Dept: OTOLARYNGOLOGY | Facility: CLINIC | Age: 38
End: 2018-08-22
Payer: COMMERCIAL

## 2018-08-22 VITALS
HEIGHT: 60 IN | WEIGHT: 179 LBS | BODY MASS INDEX: 35.14 KG/M2 | DIASTOLIC BLOOD PRESSURE: 78 MMHG | SYSTOLIC BLOOD PRESSURE: 123 MMHG | HEART RATE: 78 BPM | TEMPERATURE: 99 F

## 2018-08-22 DIAGNOSIS — R13.14 PHARYNGOESOPHAGEAL DYSPHAGIA: Primary | ICD-10-CM

## 2018-08-22 DIAGNOSIS — Z82.5 FAMILY HISTORY OF ASTHMA: ICD-10-CM

## 2018-08-22 DIAGNOSIS — R09.82 POST-NASAL DRIP: ICD-10-CM

## 2018-08-22 DIAGNOSIS — J34.9 SINUS DISORDER: ICD-10-CM

## 2018-08-22 DIAGNOSIS — K21.9 LARYNGOPHARYNGEAL REFLUX (LPR): ICD-10-CM

## 2018-08-22 PROCEDURE — 99213 OFFICE O/P EST LOW 20 MIN: CPT | Mod: S$GLB,,, | Performed by: OTOLARYNGOLOGY

## 2018-08-22 PROCEDURE — 3008F BODY MASS INDEX DOCD: CPT | Mod: CPTII,S$GLB,, | Performed by: OTOLARYNGOLOGY

## 2018-08-22 PROCEDURE — 99999 PR PBB SHADOW E&M-EST. PATIENT-LVL IV: CPT | Mod: PBBFAC,,, | Performed by: OTOLARYNGOLOGY

## 2018-08-22 NOTE — PROGRESS NOTES
"Subjective:       Patient ID: Laura Fitzpatrick is a 38 y.o. female.    Chief Complaint: Oral Pain (Swallowing)    HPI: Ms. Fitzpatrick is a 38-year-old  female whose hand written reason for the visit today is difficulty swallowing since early July this year.  It takes a few tries for her to swallow food stuffs, especially water.  She also indicates postnasal drip symptoms and nasal congestion symptoms specifically.  She sounds nasally stuffy today.  She has used Flonase NSS.  She recently completed a modified barium swallow study 07/25/2018 performed by Liya Garcia which indicated normal oral transit time; a 13 mm barium tablet readily passed through the visualized esophagus.  There was no penetration or aspiration.  Her assessment/impressions:  "The results of the MBSS indicate that patient's swallowing function is grossly WNL with noted flash penetration of thin liquids.  I was unable to speak to her ( Y. P.) about this today.  Ms. Fitzpatrick completed a thyroid ultrasound study 07/05/2018 which indicated a single subcentimeter nodule in the left lobe of the thyroid gland which did not meet criteria for FNA.  Cervical lymph nodes demonstrated normal morphology and size. No mention is made of thyromegaly.She had been evaluated by Dr. NATALI Gregory 07/02/2018 for thyromegaly and difficulty swallowing solids.  However the patient indicates her difficulty swallowing water specifically to me today.  She was diagnosed with thyromegaly and  unspecified dysphagia.    She had been evaluated by my colleague Dr. Drew Lopez in January 2016 for sinus symptoms and chronic ethmoid sinusitis.    An endoscopic study was completed which indicated mucoid exudate bilaterally and posterior flow as well as mild laryngeal reflux changes compatible with LPR.    She has a twin sister who has 5 children including 2 sets of twin children.  Her twin sister suffers with asthma.    PMH:  PSH:  Appendectomy 02/2011; herniorrhaphy " 01/2017  Family history:  Heart disease, high blood pressure, asthma, mental illness, migraine, diabetes, thyroid disease, kidney disease  Allergies:  None known  Habits:  Patient denies tobacco use; 0-1 alcoholic drinks per day; 1 cup of tea per day  Occupation:  SLP  Review of Systems   Ears: Positive for ear pressure.    Nose:  Positive for postnasal drip.    Mouth/Throat: Positive for pain swallowing and impaired swallowing.   Respiratory:  Positive for recent cough. Shortness of breath: Sometimes.    Gastrointestinal:  Positive for acid reflux.   Other:  Negative for rash.     No current outpatient medications on file prior to visit.     No current facility-administered medications on file prior to visit.          Objective:     Blood pressure 123/78 pulse 74 temperature 98.6° height 5 ft weight 179 lb  General:  Alert and oriented pale complex did female in no acute distress; she does not sound hoarse and she  is easily able to swallow her own secretions.  She does sound hyper nasal when speaking and she sniffles throughout the exam today.  Physical Exam   Constitutional: She is oriented to person, place, and time. She appears well-developed and well-nourished.   HENT:   Head: Normocephalic.   Right Ear: Tympanic membrane and external ear normal. No drainage. No foreign bodies. No mastoid tenderness. Tympanic membrane is not perforated. No decreased hearing is noted.   Left Ear: Tympanic membrane and external ear normal. No drainage. No foreign bodies. No mastoid tenderness. Tympanic membrane is not perforated. No decreased hearing is noted.   Nose: Nose normal. No nasal deformity, septal deviation or nasal septal hematoma. No epistaxis. Right sinus exhibits no maxillary sinus tenderness and no frontal sinus tenderness. Left sinus exhibits no maxillary sinus tenderness and no frontal sinus tenderness.       Mouth/Throat: Uvula is midline, oropharynx is clear and moist and mucous membranes are normal. No oral  lesions. No trismus in the jaw. No uvula swelling. No oropharyngeal exudate or tonsillar abscesses.       Neck: Neck supple. No tracheal deviation present. No thyromegaly present.   Pulmonary/Chest: Effort normal. No stridor.   Lymphadenopathy:     She has no cervical adenopathy.   Neurological: She is alert and oriented to person, place, and time.   Skin: No rash noted.       Assessment:       1. Pharyngoesophageal dysphagia    2. Post-nasal drip    3. Sinus disorder    4. Laryngopharyngeal reflux (LPR)    5. Family history of asthma        Plan:     Written Rx for Singulair 10 mg # 30; , take daily  Written Rx for Astelin nasal spray; 1 spray @ lateral nostril BID along with 1 spray @ nostril BID Flonase NSS x 6 weeks   Consider sinus consultation [facility] Dr. EVIE Leahy pending course  Allergy evaluation ordered  Anti GERD literature provided; strict measures may help

## 2018-08-22 NOTE — LETTER
August 23, 2018      Zulema Ha, DO  [address] [facility]  [address]  [place] [address]           [facility] Otorhinol[facility]ology  [address]  [place] [address]  Phone: [phone]  Fax: [phone]          Patient: Laura Fitzpatrick   MR Number: [id number]   YOB: 1980   Date of Visit: 8/22/2018       Dear Dr. Zulema Ha:    Thank you for referring Laura Fitzpatrick to me for evaluation. Attached you will find relevant portions of my assessment and plan of care.    If you have questions, please do not hesitate to call me. I look forward to following Laura Fitzpatrick along with you.    Sincerely,    Bryan Duron III, MD    Enclosure  CC:  No Recipients    If you would like to receive this communication electronically, please contact [email or web address] or [phone] to request more information on [employer] access.    For providers and/or their staff who would like to refer a patient to [facility], please contact us through our one-stop-shop provider referral line, [facility], at [phone].    If you feel you have received this communication in error or would no longer like to receive these types of communications, please e-mail [email or web address]

## 2018-08-22 NOTE — PATIENT INSTRUCTIONS
Written Rx for Singulair 10 mg # 30; , take daily  Written Rx for Astelin nasal spray; 1 spray @ lateral nostril BID along with 1 spray @ nostril BID Flonase NSS  Consider sinus consultation wih Dr. EVIE Leahy pending course  Allergy evaluation ordered  Anti GERD literature provided; strict measures may help

## 2018-08-28 ENCOUNTER — PATIENT MESSAGE (OUTPATIENT)
Dept: FAMILY MEDICINE | Facility: CLINIC | Age: 38
End: 2018-08-28

## 2018-08-30 DIAGNOSIS — K21.9 GASTROESOPHAGEAL REFLUX DISEASE, ESOPHAGITIS PRESENCE NOT SPECIFIED: Primary | ICD-10-CM

## 2018-08-30 RX ORDER — PANTOPRAZOLE SODIUM 40 MG/1
40 TABLET, DELAYED RELEASE ORAL DAILY
Qty: 90 TABLET | Refills: 1 | Status: SHIPPED | OUTPATIENT
Start: 2018-08-30 | End: 2019-12-13

## 2018-09-04 ENCOUNTER — TELEPHONE (OUTPATIENT)
Dept: ENDOSCOPY | Facility: HOSPITAL | Age: 38
End: 2018-09-04

## 2018-09-04 ENCOUNTER — OFFICE VISIT (OUTPATIENT)
Dept: GASTROENTEROLOGY | Facility: CLINIC | Age: 38
End: 2018-09-04
Payer: COMMERCIAL

## 2018-09-04 VITALS
SYSTOLIC BLOOD PRESSURE: 121 MMHG | WEIGHT: 176.56 LBS | HEIGHT: 61 IN | HEART RATE: 78 BPM | DIASTOLIC BLOOD PRESSURE: 77 MMHG | BODY MASS INDEX: 33.34 KG/M2

## 2018-09-04 DIAGNOSIS — K21.9 GASTROESOPHAGEAL REFLUX DISEASE WITHOUT ESOPHAGITIS: ICD-10-CM

## 2018-09-04 DIAGNOSIS — R13.14 PHARYNGOESOPHAGEAL DYSPHAGIA: Primary | ICD-10-CM

## 2018-09-04 DIAGNOSIS — K21.9 LARYNGOPHARYNGEAL REFLUX (LPR): ICD-10-CM

## 2018-09-04 PROCEDURE — 99214 OFFICE O/P EST MOD 30 MIN: CPT | Mod: S$GLB,,, | Performed by: PHYSICIAN ASSISTANT

## 2018-09-04 PROCEDURE — 3008F BODY MASS INDEX DOCD: CPT | Mod: CPTII,S$GLB,, | Performed by: PHYSICIAN ASSISTANT

## 2018-09-04 PROCEDURE — 99999 PR PBB SHADOW E&M-EST. PATIENT-LVL III: CPT | Mod: PBBFAC,,, | Performed by: PHYSICIAN ASSISTANT

## 2018-09-04 RX ORDER — MONTELUKAST SODIUM 10 MG/1
10 TABLET ORAL NIGHTLY
COMMUNITY
End: 2019-12-13

## 2018-09-04 NOTE — H&P (VIEW-ONLY)
[facility] Consultation Note    Reason for Consult:  The primary encounter diagnosis was Pharyngoesophageal dysphagia. Diagnoses of Gastroesophageal reflux disease without esophagitis and Laryngopharyngeal reflux (LPR) were also pertinent to this visit.    PCP:   Zulema Ha       Referring MD:  No referring provider defined for this encounter.    HPI:  This is a 38 y.o. female here for evaluation of  Dysphagia  Last seen by Dr Coker 1 yr ago for GERD, but she denied dysphagia at the time    Today she reports supraclavicular dysphagia, choking, coughing with solids and liquids  Duration: 2.5months ago (late June)  Occurring with all liquids and foods    She does have a hx of typical GERD which improved with weight loss and diet changes.   She has lost 25 lbs in 1.5yrs  Stopped the protonix last April because her symptoms had improved.  Restarted the protonix 40mg, but is taking it at 4pm    Denies N/V, abdominal pain    She has been diagnosed with LPR in the past. ENT recently started her on singulair, flonase and astelin nasal spray    MBSS was also WNL    ROS:  Constitutional: No fevers, chills, No weight loss  ENT: No allergies  CV: No chest pain  Pulm: No cough, No shortness of breath  Ophtho: No vision changes  GI: see HPI  Derm: No rash  Heme: No lymphadenopathy, No bruising  MSK: No arthritis  : No dysuria, No hematuria  Endo: No hot or cold intolerance  Neuro: No syncope, No seizure  Psych: No anxiety, No depression    Medical History:  has a past medical history of Abnormal Pap smear (12 years ago), Cervical radiculopathy (4/29/2015), Cervical radiculopathy (4/29/2015), Enlarged thyroid gland (1/26/2015), GERD (gastroesophageal reflux disease), Obesity (BMI 30-39.9), and Right-sided carotid artery disease (2/8/2016).    Surgical History:  has a past surgical history that includes Appendectomy (2011); Myringotomy w/ tubes; Inguinal hernia repair (Right, 2017); and  "REPAIR-HERNIA-INGUINAL Open Right with Mesh (Right, 1/24/2017).    Family History: family history includes Anxiety disorder in her mother; Asthma in her sister; Breast cancer in her paternal grandmother; Cancer (age of onset: 58) in her paternal grandmother; Depression in her mother; Diabetes in her father, maternal grandfather, maternal grandmother, mother, and paternal grandmother; Gestational diabetes in her sister; Heart disease in her maternal grandmother; Heart disease (age of onset: 40) in her mother; Hypertension in her father; Kidney disease in her father; Obesity in her father and mother..     Social History:  reports that  has never smoked. she has never used smokeless tobacco. She reports that she drinks alcohol. She reports that she does not use drugs.    Review of patient's allergies indicates:  No Known Allergies    Current Outpatient Medications on File Prior to Visit   Medication Sig Dispense Refill    montelukast (SINGULAIR) 10 mg tablet Take 10 mg by mouth every evening.      pantoprazole (PROTONIX) 40 MG tablet Take 1 tablet (40 mg total) by mouth once daily. 90 tablet 1     No current facility-administered medications on file prior to visit.          Objective Findings:    Vital Signs:  /77   Pulse 78   Ht 5' 1" (1.549 m)   Wt 80.1 kg (176 lb 9.4 oz)   LMP 08/20/2018 (Exact Date)   BMI 33.37 kg/m²   Body mass index is 33.37 kg/m².    Physical Exam:  General Appearance: Well appearing in no acute distress  Head:   Normocephalic, without obvious abnormality  Eyes:    No scleral icterus  ENT: Neck supple, Lips, mucosa, and tongue normal  Lungs: CTA bilaterally in anterior and posterior fields, no wheezes, no crackles.  Heart:  Regular rate and rhythm, S1, S2 normal, no murmurs heard  Abdomen: Soft, non tender, non distended with positive bowel sounds in all four quadrants.   Extremities: no edema  Skin: No rash  Neurologic: AAO x 3      Labs:  Lab Results   Component Value Date    WBC " 5.02 08/15/2018    HGB 13.6 08/15/2018    HCT 42.8 08/15/2018     08/15/2018    CHOL 153 08/15/2018    TRIG 53 08/15/2018    HDL 40 08/15/2018    ALT 30 08/15/2018    AST 22 08/15/2018     (L) 08/15/2018    K 4.0 08/15/2018     08/15/2018    CREATININE 0.8 08/15/2018    BUN 14 08/15/2018    CO2 23 08/15/2018    TSH 1.375 08/15/2018    INR 1.1 02/15/2011    HGBA1C 5.4 01/31/2015       Imaging:  ABD US- no gallstones  MBSS- WNL    Endoscopy:    none  Assessment:  1. Pharyngoesophageal dysphagia    2. Gastroesophageal reflux disease without esophagitis    3. Laryngopharyngeal reflux (LPR)      39yo F with Hx of GERD and LRP presents with supraclavicular dysphagia, choking, coughing with solids and liquids x 2.5 months. Sx onset did start 1.5 months after stopping her PPI. MBSS was WNL. No prior EGD    Recommendations:  1. Schedule EGD to rule out esophageal stricture, esophagitis, hiatal hernia, and Bx for EOE    2. Continue protonix  40mg. Advise she take it 30 -45 minutes before her first meal rather than at 4pm.     3. GERD diet    No Follow-up on file.      Order summary:  Orders Placed This Encounter    Case request GI: EGD (ESOPHAGOGASTRODUODENOSCOPY)         Thank you so much for allowing me to participate in the care of Laura Anthony PA-C

## 2018-09-04 NOTE — PATIENT INSTRUCTIONS
[email or web address]  Dropping Acid The Reflux Diet Cookbook and Cure -  Orlando Carver M.D.    GERD  Worst Foods for Acid Reflux  Chocolate (milk chocolate worse than dark chocolate)  Soda (all carbonated beverages)  Alcohol (beer, liquor, wine)  Fried foods  Pierre, sausage, ribs  Cream sauce  Fatty meats (beef)  Butter, margarine, lard, shortening  Coffee, tea  Mint   High fat nuts  Hot sauces and pepper  Citrus fruit/juices      Acidic foods (pH - 1 is MORE acidic, 5 is LESS acidic)     Do not eat or drink these (lower numbers are worse)    Induction diet - For 2 weeks eat nothing below pH 5     Lemon juice 2.3  Grape cranberry juice 2.5  Stomach Acid 2.5  Gelatin Dessert 2.6  Lemon/lime 2.9/2.7  Vinegar 2.9  Gatorade 3.0  Fruits - plums, apricots, strawberries, cherries 3.0  Vitamin C (ascorbic acid) 3.0  Iced tea, Snapple 3.1  Mustard 3.2  Soft drinks 3.3  Nectarines 3.3  Pomegranate 3.3  Applesauce 3.4  Grapefruit 3.4  Kiwi 3.4  Barbecue sauce 3.4  Caesar dressing 3.5  Th[place] island dressing 3.6  Strawberries 3.5  Pineapple juice 3.5  Beer 3.5  Wine 3.5  Grape 3.6  Apples 3.6  Pineapple 3.7  Pickle 3.7  Blackberries, blueberries 3.7  [place] 3.7  Orange 3.8  Cherries 3.9  Red Bull 3.9  Tomatoes 4.2  Coffee 5.1      These are Safe foods:  Agave  Aloe Vera  Apple (only red)  Bagels  Banana (worsens reflux in 1%)  Beans - black, red, lima, lentils  Bread - whole grain, rye  Caramel  Celery  Chamomile tea  Chicken - skinless, never fried  Chicken stock or bouillon  Coffee - one cup/day with milk  Fennel  Fish  Elise  Green vegetables (no green peppers)  Herbs  Honey  Melon  Milk - skin, soy, or Lactaid skim milk  Mushrooms  Oatmeal  Olive oil  Parsley  Pasta  Pears  Popcorn  Potatoes  Red bell peppers  Rice  Soups  Tofu  Turkey Breast  Turnip  Vegetables - no onion, tomatoes, peppers  Vinaigrette  Water - non carbonated  Whole grain breads, crackers, breakfast cereals      Best Foods for Acid  Reflux  Whole grain breads  Oatmeal  Aloe Vera  Salad (no tomatoes, onions, cheese, or high fat dressing)  Banana  Melon  Fennel  Chicken and turkey (skinless, never fried)  Fish/seafood (never fried)  Celery  Parsley  Couscous and Rice    Maybe bad foods (Everyone is unique)  Tomatoes  Garlic  Onion  Nuts (macadamia nuts)  Apples (especially green)  Cucumber  Green peppers  Spicy food  Some herbal teas    GERD tips  Change what you eat:  Eat smaller meals  Eat slowly and chew thoroughly until food is almost liquid  Cut down on junk carbohydrates such as sugar and white flour  Use herbs in your cooking  Eat more raw foods (more than 10 ingredients is not a raw food)  Avoid trans fats and partially hydrogenated oils  Eat more fish and switch to grass fed beef  Switch your cooking oil to macadamia nut or olive oil  Watch extremes of salt intake (too high or too low is bad)    If just cutting out acidic foods is not enough, change how you eat:  Large breakfast, medium lunch, light dinner  Dont mix fruit juices, sweet fruits, and refined starches with meats and heavy food  Dont wash your food down with a lot of liquid    List A Proteins - meat, poultry, cheese, eggs, fish, beans, yogurt    List B Neutral - vegetables, salads, seeds, nuts, herbs, cream, butter, olive oil    List C Starches - biscuit, breads, cake, crackers, oats, pasta, potatoes, rice, sugar/honey, sweets    A + B = ok  B + C = ok  Never mix list A and C!!    Change these habits:  Stop smoking  Eat dinner earlier (3-4 hours before lying down to sleep)  Elevate the head of your bed 6 inches (blocks under the head of the bed are better than pillows)  Exercise (but wait 2 hours after eating)  Drink more water (between meals)    Take these supplements:  Multi vitamin  Probiotic  Fish oil    Most common food allergens: milk, eggs, peanuts, tree nuts, fish, shellfish, wheat, and soy    All natural immediate relief:  Chew 2-3 soft probiotic capsules -   Issac's Probiotics 12 Plus  Chew chewable DGL licorice tablet  Chew papaya tablet with high protein meal - [employer]  Drink 2 ounces of aloe vera juice  Swedish bitters  Prelief- reduce the acid in food to keep it form burning sensitive tissue  Iberogast  [place]  Drink Chamomile Tea  Teaspoon of baking soda in water  Spoonful of vinegar in water      All natural ulcer healers:  Zinc carnosine - 75.5 mg with food twice a day x 8 weeks   Christy Chavarria - $8 for 60 pills  DGL (deglycyrrhizinated licorice) - 2 tablets before meals. Heals stomach lining   Natural Factors brand, Enzymatic therapy brand.  Aloe Vera juice  - 2 to 8 ounces a day   [place] or [place]

## 2018-09-19 ENCOUNTER — ANESTHESIA EVENT (OUTPATIENT)
Dept: ENDOSCOPY | Facility: HOSPITAL | Age: 38
End: 2018-09-19
Payer: COMMERCIAL

## 2018-09-20 ENCOUNTER — HOSPITAL ENCOUNTER (OUTPATIENT)
Facility: HOSPITAL | Age: 38
Discharge: HOME | End: 2018-09-20
Attending: INTERNAL MEDICINE | Admitting: INTERNAL MEDICINE
Payer: COMMERCIAL

## 2018-09-20 ENCOUNTER — ANESTHESIA (OUTPATIENT)
Dept: ENDOSCOPY | Facility: HOSPITAL | Age: 38
End: 2018-09-20
Payer: COMMERCIAL

## 2018-09-20 VITALS
HEART RATE: 72 BPM | SYSTOLIC BLOOD PRESSURE: 109 MMHG | DIASTOLIC BLOOD PRESSURE: 62 MMHG | HEIGHT: 61 IN | WEIGHT: 170 LBS | BODY MASS INDEX: 32.1 KG/M2 | TEMPERATURE: 98 F | OXYGEN SATURATION: 98 % | RESPIRATION RATE: 18 BRPM

## 2018-09-20 DIAGNOSIS — R13.10 DYSPHAGIA: ICD-10-CM

## 2018-09-20 DIAGNOSIS — R13.14 PHARYNGOESOPHAGEAL DYSPHAGIA: Primary | ICD-10-CM

## 2018-09-20 LAB
B-HCG UR QL: NEGATIVE
CTP QC/QA: YES

## 2018-09-20 PROCEDURE — 37000009 HC ANESTHESIA EA ADD 15 MINS: Performed by: INTERNAL MEDICINE

## 2018-09-20 PROCEDURE — 25000003 PHARM REV CODE 250: Performed by: NURSE ANESTHETIST, CERTIFIED REGISTERED

## 2018-09-20 PROCEDURE — E9220 PRA ENDO ANESTHESIA: HCPCS | Mod: ,,, | Performed by: NURSE ANESTHETIST, CERTIFIED REGISTERED

## 2018-09-20 PROCEDURE — 43239 EGD BIOPSY SINGLE/MULTIPLE: CPT | Performed by: INTERNAL MEDICINE

## 2018-09-20 PROCEDURE — 27201012 HC FORCEPS, HOT/COLD, DISP: Performed by: INTERNAL MEDICINE

## 2018-09-20 PROCEDURE — 25000003 PHARM REV CODE 250: Performed by: INTERNAL MEDICINE

## 2018-09-20 PROCEDURE — 63600175 PHARM REV CODE 636 W HCPCS: Performed by: NURSE ANESTHETIST, CERTIFIED REGISTERED

## 2018-09-20 PROCEDURE — 37000008 HC ANESTHESIA 1ST 15 MINUTES: Performed by: INTERNAL MEDICINE

## 2018-09-20 PROCEDURE — 88305 TISSUE EXAM BY PATHOLOGIST: CPT | Mod: 26,TXP,, | Performed by: PATHOLOGY

## 2018-09-20 PROCEDURE — 81025 URINE PREGNANCY TEST: CPT | Performed by: INTERNAL MEDICINE

## 2018-09-20 PROCEDURE — 43239 EGD BIOPSY SINGLE/MULTIPLE: CPT | Mod: ,,, | Performed by: INTERNAL MEDICINE

## 2018-09-20 PROCEDURE — 88305 TISSUE EXAM BY PATHOLOGIST: CPT | Performed by: PATHOLOGY

## 2018-09-20 RX ORDER — PROPOFOL 10 MG/ML
VIAL (ML) INTRAVENOUS CONTINUOUS PRN
Status: DISCONTINUED | OUTPATIENT
Start: 2018-09-20 | End: 2018-09-20

## 2018-09-20 RX ORDER — SODIUM CHLORIDE 9 MG/ML
INJECTION, SOLUTION INTRAVENOUS CONTINUOUS
Status: DISCONTINUED | OUTPATIENT
Start: 2018-09-20 | End: 2018-09-20 | Stop reason: HOSPADM

## 2018-09-20 RX ORDER — LIDOCAINE HCL/PF 100 MG/5ML
SYRINGE (ML) INTRAVENOUS
Status: DISCONTINUED | OUTPATIENT
Start: 2018-09-20 | End: 2018-09-20

## 2018-09-20 RX ORDER — PROPOFOL 10 MG/ML
VIAL (ML) INTRAVENOUS
Status: DISCONTINUED | OUTPATIENT
Start: 2018-09-20 | End: 2018-09-20

## 2018-09-20 RX ORDER — GLYCOPYRROLATE 0.2 MG/ML
INJECTION INTRAMUSCULAR; INTRAVENOUS
Status: DISCONTINUED | OUTPATIENT
Start: 2018-09-20 | End: 2018-09-20

## 2018-09-20 RX ADMIN — PROPOFOL 100 MG: 10 INJECTION, EMULSION INTRAVENOUS at 02:09

## 2018-09-20 RX ADMIN — PROPOFOL 150 MCG/KG/MIN: 10 INJECTION, EMULSION INTRAVENOUS at 02:09

## 2018-09-20 RX ADMIN — LIDOCAINE HYDROCHLORIDE 80 MG: 20 INJECTION, SOLUTION INTRAVENOUS at 02:09

## 2018-09-20 RX ADMIN — SODIUM CHLORIDE: 0.9 INJECTION, SOLUTION INTRAVENOUS at 02:09

## 2018-09-20 RX ADMIN — GLYCOPYRROLATE 0.2 MG: 0.2 INJECTION, SOLUTION INTRAMUSCULAR; INTRAVENOUS at 02:09

## 2018-09-20 NOTE — ANESTHESIA POSTPROCEDURE EVALUATION
"Anesthesia Post Evaluation    Patient: Laura Fitzpatrick    Procedure(s) Performed: Procedure(s) (LRB):  EGD (ESOPHAGOGASTRODUODENOSCOPY) (N/A)    Final Anesthesia Type: general  Patient location during evaluation: PACU  Patient participation: Yes- Able to Participate  Level of consciousness: awake and alert and oriented  Post-procedure vital signs: reviewed and stable  Pain management: adequate  Airway patency: patent  PONV status at discharge: No PONV  Anesthetic complications: no      Cardiovascular status: hemodynamically stable  Respiratory status: unassisted  Hydration status: euvolemic  Follow-up not needed.        Visit Vitals  /62 (BP Location: Right arm, Patient Position: Sitting)   Pulse 72   Temp 36.8 °C (98.2 °F) (Oral)   Resp 18   Ht 5' 1" (1.549 m)   Wt 77.1 kg (170 lb)   SpO2 98%   Breastfeeding? No   BMI 32.12 kg/m²       Pain/Stephanie Score: Pain Assessment Performed: Yes (9/20/2018  3:20 PM)  Presence of Pain: denies (9/20/2018  3:20 PM)  Stephanie Score: 10 (9/20/2018  3:20 PM)        "

## 2018-09-20 NOTE — TRANSFER OF CARE
"Anesthesia Transfer of Care Note    Patient: Laura Fitzpatrick    Procedure(s) Performed: Procedure(s) (LRB):  EGD (ESOPHAGOGASTRODUODENOSCOPY) (N/A)    Patient location: PACU    Anesthesia Type: general    Transport from OR: Transported from OR on room air with adequate spontaneous ventilation    Post pain: adequate analgesia    Post assessment: tolerated procedure well and no apparent anesthetic complications    Post vital signs: stable    Level of consciousness: awake, alert and oriented    Nausea/Vomiting: no nausea/vomiting    Complications: none    Transfer of care protocol was followed      Last vitals:   Visit Vitals  BP (!) 112/59   Pulse 76   Temp 37 °C (98.6 °F)   Resp 18   Ht 5' 1" (1.549 m)   Wt 77.1 kg (170 lb)   SpO2 96%   Breastfeeding? No   BMI 32.12 kg/m²     "

## 2018-09-20 NOTE — INTERVAL H&P NOTE
The patient has been examined and the H&P has been reviewed:  There is no interval changes since last encounter.    EGD: Dysphagia and GERD  Sedation: GA  ASA: Per anesthesia  Mallampati : Per anesthesia    Endoscopy risks, benefits and alternative options discussed and understood by patient/family.          Active Hospital Problems    Diagnosis  POA    Dysphagia [R13.10]  Yes      Resolved Hospital Problems   No resolved problems to display.

## 2018-09-20 NOTE — ANESTHESIA PREPROCEDURE EVALUATION
09/20/2018  Laura Fitzpatrick is a 38 y.o., female.    Patient Active Problem List   Diagnosis    Obesity (BMI 30-39.9)    Mild acid reflux    Pharyngoesophageal dysphagia         Anesthesia Evaluation    I have reviewed the Patient Summary Reports.     I have reviewed the Medications.     Review of Systems  Anesthesia Hx:   Denies Personal Hx of Anesthesia complications.   Hematology/Oncology:  Hematology Normal   Oncology Normal     EENT/Dental:EENT/Dental Normal   Cardiovascular:  Cardiovascular Normal     Pulmonary:  Pulmonary Normal    Renal/:  Renal/ Normal     Hepatic/GI:   GERD    Musculoskeletal:  Musculoskeletal Normal    Neurological:   Neuromuscular Disease,    Endocrine:  Endocrine Normal    Dermatological:  Skin Normal    Psych:  Psychiatric Normal           Physical Exam  General:  Well nourished    Airway/Jaw/Neck:  Airway Findings: Mouth Opening: Normal Tongue: Normal  General Airway Assessment: Adult  Mallampati: I  TM Distance: Normal, at least 6 cm        Eyes/Ears/Nose:  EYES/EARS/NOSE FINDINGS: Normal   Dental:  Dental Findings: In tact   Chest/Lungs:  Chest/Lungs Findings: Normal Respiratory Rate, Clear to auscultation     Heart/Vascular:  Heart Findings: Rate: Normal  Rhythm: Regular Rhythm  Sounds: Normal  Heart murmur: negative Vascular Findings: Normal    Abdomen:  Abdomen Findings: Normal    Musculoskeletal:  Musculoskeletal Findings: Normal   Skin:  Skin Findings: Normal    Mental Status:  Mental Status Findings: Normal        Anesthesia Plan  Type of Anesthesia, risks & benefits discussed:  Anesthesia Type:  general  Patient's Preference: General  Intra-op Monitoring Plan: standard ASA monitors  Intra-op Monitoring Plan Comments:   Post Op Pain Control Plan:   Post Op Pain Control Plan Comments:   Induction:   IV  Beta Blocker:  Patient is not currently on a Beta-Blocker  (No further documentation required).       Informed Consent: Patient understands risks and agrees with Anesthesia plan.  Questions answered. Anesthesia consent signed with patient.  ASA Score: 2     Day of Surgery Review of History & Physical: I have interviewed and examined the patient. I have reviewed the patient's H&P dated:  There are no significant changes.  H&P update referred to the surgeon.         Ready For Surgery From Anesthesia Perspective.

## 2018-09-20 NOTE — PLAN OF CARE
Pt verbalized an understanding of discharge instructions. Pt refused wheel chair and ambulated off unit with sister.

## 2018-09-20 NOTE — [employer] PATIENT INSTRUCTIONS
Discharge Summary/Instructions after an Endoscopic Procedure  Patient Name: Laura Fitzpatrick  Patient MRN: [id number]  Patient YOB: 1980  Thursday, September 20, 2018  Karl Judge MD  RESTRICTIONS:  During your procedure today, you received medications for sedation.  These   medications may affect your judgment, balance and coordination.  Therefore,   for 24 hours, you have the following restrictions:   - DO NOT drive a car, operate machinery, make legal/financial decisions,   sign important papers or drink alcohol.    ACTIVITY:  Today: no heavy lifting, straining or running due to procedural   sedation/anesthesia.  The following day: return to full activity including work.  DIET:  Eat and drink normally unless instructed otherwise.     TREATMENT FOR COMMON SIDE EFFECTS:  - Mild abdominal pain, nausea, belching, bloating or excessive gas:  rest,   eat lightly and use a heating pad.  - Sore Throat: treat with throat lozenges and/or gargle with warm salt   water.  - Because air was used during the procedure, expelling large amounts of air   from your rectum or belching is normal.  - If a bowel prep was taken, you may not have a bowel movement for 1-3 days.    This is normal.  SYMPTOMS TO WATCH FOR AND REPORT TO YOUR PHYSICIAN:  1. Abdominal pain or bloating, other than gas cramps.  2. Chest pain.  3. Back pain.  4. Signs of infection such as: chills or fever occurring within 24 hours   after the procedure.  5. Rectal bleeding, which would show as bright red, maroon, or black stools.   (A tablespoon of blood from the rectum is not serious, especially if   hemorrhoids are present.)  6. Vomiting.  7. Weakness or dizziness.  GO DIRECTLY TO THE NEAREST EMERGENCY ROOM IF YOU HAVE ANY OF THE FOLLOWING:      Difficulty breathing              Chills and/or fever over 101 F   Persistent vomiting and/or vomiting blood   Severe abdominal pain   Severe chest pain   Black, tarry stools   Bleeding- more than one  tablespoon   Any other symptom or condition that you feel may need urgent attention  Your doctor recommends these additional instructions:  If any biopsies were taken, your doctors clinic will contact you in 1 to 2   weeks with any results.  - Patient has a contact number available for emergencies.  The signs and   symptoms of potential delayed complications were discussed with the   patient.  Return to normal activities tomorrow.  Written discharge   instructions were provided to the patient.   - Discharge patient to home.   - Resume previous diet.   - Continue present medications.   - Await pathology results.   For questions, problems or results please call your physician - Karl Judge MD at Work:  [phone].  [facility], EMERGENCY ROOM PHONE NUMBER: [phone]  IF A COMPLICATION OR EMERGENCY SITUATION ARISES AND YOU ARE UNABLE TO REACH   YOUR PHYSICIAN - GO DIRECTLY TO THE EMERGENCY ROOM.  Karl Judge MD  9/20/2018 3:05:21 PM  This report has been verified and signed electronically.  [employer]

## 2018-09-20 NOTE — DISCHARGE INSTRUCTIONS

## 2018-09-21 ENCOUNTER — TELEPHONE (OUTPATIENT)
Dept: GASTROENTEROLOGY | Facility: CLINIC | Age: 38
End: 2018-09-21

## 2018-09-21 ENCOUNTER — NURSE TRIAGE (OUTPATIENT)
Dept: ADMINISTRATIVE | Facility: CLINIC | Age: 38
End: 2018-09-21

## 2018-09-21 ENCOUNTER — HOSPITAL ENCOUNTER (OUTPATIENT)
Dept: RADIOLOGY | Facility: HOSPITAL | Age: 38
Discharge: HOME | End: 2018-09-21
Attending: INTERNAL MEDICINE
Payer: COMMERCIAL

## 2018-09-21 ENCOUNTER — TELEPHONE (OUTPATIENT)
Dept: GASTROENTEROLOGY | Facility: HOSPITAL | Age: 38
End: 2018-09-21

## 2018-09-21 DIAGNOSIS — R07.9 CHEST PAIN, UNSPECIFIED TYPE: Primary | ICD-10-CM

## 2018-09-21 DIAGNOSIS — R07.9 CHEST PAIN, UNSPECIFIED TYPE: ICD-10-CM

## 2018-09-21 PROCEDURE — 71046 X-RAY EXAM CHEST 2 VIEWS: CPT | Mod: TC

## 2018-09-21 PROCEDURE — 71046 X-RAY EXAM CHEST 2 VIEWS: CPT | Mod: 26,,, | Performed by: RADIOLOGY

## 2018-09-21 NOTE — TELEPHONE ENCOUNTER
----- Message from Karl Judge MD sent at 9/21/2018  7:34 AM CDT -----  Please check on the patient. She was having some sore throat after endoscopy yesterday. This should improve with some warm salt water gargles.

## 2018-09-21 NOTE — TELEPHONE ENCOUNTER
Dr. Judge ,  Patient states she is having pain in her back and chest pain and she states its a 6 or 7  Patient  states this happened after her procedure  And se also states its not her throat   Please advise

## 2018-09-21 NOTE — TELEPHONE ENCOUNTER
----- Message from Karl Judge MD sent at 9/21/2018 12:15 PM CDT -----  Please notify patient, the chest X ray looks fine.

## 2018-09-21 NOTE — TELEPHONE ENCOUNTER
09/21/2018  7:29 AM    Ms. Rigdon called this morning at around 7:30 am because she reports back pain which wraps around as well as odynophagia since her EGD yesterday. Otherwise no shortness of breath, nausea, emesis, or abdominal pain.     Based on her symptoms I informed her that I would send Dr. Judge a message in order to notify him but at the moment recommended continuing to watch her symptoms.    Beatrice Comer M.D.  Gastroenterology Fellow, PGY-V  Pager: [phone]  [facility]

## 2018-09-21 NOTE — TELEPHONE ENCOUNTER
Had an endoscopy done on yesterday. Has been having a pain and tightness in chest that intensifies with swallowing or taking a deep breathe. Goes across shoulder blades. No SOB. Pain is fairly constant but intensifies. Started right after leaving from procedure. Call transferred to on call provider for endoscopy     Reason for Disposition   Nursing judgment    Protocols used: ST NO GUIDELINE OR REFERENCE [id number]

## 2018-09-24 ENCOUNTER — TELEPHONE (OUTPATIENT)
Dept: GASTROENTEROLOGY | Facility: CLINIC | Age: 38
End: 2018-09-24

## 2018-09-24 RX ORDER — HYOSCYAMINE SULFATE 0.12 MG/1
0.12 TABLET SUBLINGUAL EVERY 4 HOURS PRN
Qty: 20 TABLET | Refills: 0 | Status: SHIPPED | OUTPATIENT
Start: 2018-09-24 | End: 2019-12-13

## 2018-09-24 NOTE — TELEPHONE ENCOUNTER
Dr. Judge  Pt states she had the xray on Friday which  was normal and she's doing the gaviscon and the tylenol per your request , she states the pain is a 5 and its coming across her chest to her shoulder blade, after she eats the pain becomes worst  Please avise

## 2018-09-24 NOTE — TELEPHONE ENCOUNTER
----- Message from Seda Zacarias sent at 9/24/2018 11:59 AM CDT -----  Contact: Self- [phone]  Alfie- pt called stating following her EGD- she's been experiencing back and chest pain- pain level 5/6- consistent- please contact pt at [phone]

## 2018-09-27 ENCOUNTER — TELEPHONE (OUTPATIENT)
Dept: ENDOSCOPY | Facility: HOSPITAL | Age: 38
End: 2018-09-27

## 2018-09-28 ENCOUNTER — TELEPHONE (OUTPATIENT)
Dept: GASTROENTEROLOGY | Facility: CLINIC | Age: 38
End: 2018-09-28

## 2018-09-28 NOTE — TELEPHONE ENCOUNTER
----- Message from Karl Judge MD sent at 9/28/2018 10:08 AM CDT -----  Biopsies are fine. Follow up with .

## 2019-02-08 ENCOUNTER — OFFICE VISIT (OUTPATIENT)
Dept: URGENT CARE | Facility: CLINIC | Age: 39
End: 2019-02-08
Payer: COMMERCIAL

## 2019-02-08 VITALS
DIASTOLIC BLOOD PRESSURE: 77 MMHG | OXYGEN SATURATION: 100 % | BODY MASS INDEX: 32.1 KG/M2 | TEMPERATURE: 98 F | HEART RATE: 65 BPM | HEIGHT: 61 IN | WEIGHT: 170 LBS | SYSTOLIC BLOOD PRESSURE: 128 MMHG

## 2019-02-08 DIAGNOSIS — S86.892A LEFT MEDIAL TIBIAL STRESS SYNDROME, INITIAL ENCOUNTER: Primary | ICD-10-CM

## 2019-02-08 DIAGNOSIS — M79.605 LEFT LEG PAIN: ICD-10-CM

## 2019-02-08 PROCEDURE — 3008F PR BODY MASS INDEX (BMI) DOCUMENTED: ICD-10-PCS | Mod: CPTII,S$GLB,, | Performed by: NURSE PRACTITIONER

## 2019-02-08 PROCEDURE — 3008F BODY MASS INDEX DOCD: CPT | Mod: CPTII,S$GLB,, | Performed by: NURSE PRACTITIONER

## 2019-02-08 PROCEDURE — 99214 OFFICE O/P EST MOD 30 MIN: CPT | Mod: S$GLB,,, | Performed by: NURSE PRACTITIONER

## 2019-02-08 PROCEDURE — 73590 X-RAY EXAM OF LOWER LEG: CPT | Mod: FY,LT,S$GLB, | Performed by: RADIOLOGY

## 2019-02-08 PROCEDURE — 99214 PR OFFICE/OUTPT VISIT, EST, LEVL IV, 30-39 MIN: ICD-10-PCS | Mod: S$GLB,,, | Performed by: NURSE PRACTITIONER

## 2019-02-08 PROCEDURE — 73590 XR TIBIA FIBULA 2 VIEW LEFT: ICD-10-PCS | Mod: FY,LT,S$GLB, | Performed by: RADIOLOGY

## 2019-02-09 NOTE — PROGRESS NOTES
"Subjective:       Patient ID: Laura Fitzpatrick is a 38 y.o. female.    Vitals:  height is 5' 1" (1.549 m) and weight is 77.1 kg (170 lb). Her oral temperature is 97.8 °F (36.6 °C). Her blood pressure is 128/77 and her pulse is 65. Her oxygen saturation is 100%.     Chief Complaint: Leg Injury    38-year-old female presents today with complaints left lower leg pain.  She states she was in her marathon about 2 weeks ago [facility] and was running on uneven surfaces.  She states the pain is much worse and now she has difficulty bearing weight and trouble running at all. No direct trauma.       Leg Pain    The incident occurred more than 1 week ago (2 wks). The incident occurred at the park. There was no injury mechanism (runner and training for marathon). The pain is present in the left leg. The quality of the pain is described as stabbing. The pain is at a severity of 6/10. The pain is moderate. The pain has been fluctuating since onset. She reports no foreign bodies present. The symptoms are aggravated by movement, palpation and weight bearing. She has tried NSAIDs, ice, heat and rest for the symptoms. The treatment provided mild relief.       Constitution: Negative for fatigue.   HENT: Negative for facial swelling and facial trauma.    Neck: Negative for neck stiffness.   Cardiovascular: Negative for chest trauma.   Eyes: Negative for eye trauma, double vision and blurred vision.   Gastrointestinal: Negative for abdominal trauma, abdominal pain and rectal bleeding.   Genitourinary: Negative for hematuria, missed menses, genital trauma and pelvic pain.   Musculoskeletal: Positive for pain. Negative for trauma, joint swelling and abnormal ROM of joint.   Skin: Negative for color change, wound, abrasion, laceration and bruising.   Neurological: Negative for dizziness, history of vertigo, light-headedness, coordination disturbances, altered mental status and loss of consciousness.   Hematologic/Lymphatic: " Negative for history of bleeding disorder.   Psychiatric/Behavioral: Negative for altered mental status.       Objective:      Physical Exam   Constitutional: She is oriented to person, place, and time. She appears well-developed and well-nourished. She is cooperative.  Non-toxic appearance. She does not appear ill. No distress.   HENT:   Head: Normocephalic and atraumatic.   Right Ear: Hearing, tympanic membrane, external ear and ear canal normal.   Left Ear: Hearing, tympanic membrane, external ear and ear canal normal.   Nose: Nose normal. No mucosal edema, rhinorrhea or nasal deformity. No epistaxis. Right sinus exhibits no maxillary sinus tenderness and no frontal sinus tenderness. Left sinus exhibits no maxillary sinus tenderness and no frontal sinus tenderness.   Mouth/Throat: Uvula is midline, oropharynx is clear and moist and mucous membranes are normal. No trismus in the jaw. Normal dentition. No uvula swelling. No posterior oropharyngeal erythema.   Eyes: Conjunctivae and lids are normal. Right eye exhibits no discharge. Left eye exhibits no discharge. No scleral icterus.   Sclera clear bilat   Neck: Trachea normal, normal range of motion, full passive range of motion without pain and phonation normal. Neck supple.   Cardiovascular: Normal rate, regular rhythm, normal heart sounds, intact distal pulses and normal pulses.   Pulmonary/Chest: Effort normal and breath sounds normal. No respiratory distress.   Abdominal: Normal appearance. She exhibits no pulsatile midline mass.   Musculoskeletal: Normal range of motion. She exhibits no edema or deformity.   Neurological: She is alert and oriented to person, place, and time. She exhibits normal muscle tone. Coordination normal.   Skin: Skin is warm, dry and intact. She is not diaphoretic. No pallor.   Psychiatric: She has a normal mood and affect. Her speech is normal and behavior is normal. Judgment and thought content normal. Cognition and memory are normal.    Nursing note and vitals reviewed.      X-ray:  AP and lateral views of the tibia and fibula demonstrate no acute fracture or dislocation.  There is a tiny well corticated ovoid dystrophic calcification projecting anteriorly over the lower patella on the lateral view.  Assessment:       1. Left leg pain    2. Left medial tibial stress syndrome, initial encounter        Plan:         Despite recommendations patient is requesting to have x-ray to rule out fracture.     Left leg pain  -     X-Ray Tibia Fibula 2 View Left; Future; Expected date: 02/08/2019    Left medial tibial stress syndrome, initial encounter            Patient Instructions     You must understand that you've received an Urgent Care treatment only and that you may be released before all your medical problems are known or treated. You, the patient, will arrange for follow up care as instructed.  If your condition worsens we recommend that you receive another evaluation at the emergency room immediately or contact your primary medical clinics after hours call service to discuss your concerns.  Please return here or go to the Emergency Department for any concerns or worsening of condition.      Shin Splints (Medial Tibial Stress Syndrome)  Pain felt in the front of your lower leg is often called shin splints. One common cause of this pain is tendinitis--inflammation of tendons (tough, cordlike bands of tissue that connect muscle to bone). When the tendons of the muscles near the shinbone (tibia) become inflamed, the pain is felt along the shin. Shin splints often affect athletes and runners, and are commonly due to overuse. A less common cause is flat feet with low arches.  Symptoms of shin splints  Symptoms of shin splints often start as a dull ache that gets worse over time. Pain may also be sharp or stabbing. Resting your legs often relieves the symptoms. Pain may occur both during or after activity. Later, the pain may become continuous with  almost any activity.  Your evaluation  Your doctor will ask you questions about your activities and your health history. Be sure to tell your doctor about possible injuries. The diagnosis is usually made through the history and physical exam. There are no tests for shin splints, but your doctor may want to do some tests to rule out a stress fracture in your shinbone. These tests may include an X-ray, bone scan, or MRI (magnetic resonance imaging) test.    Treating shin splints  Follow these and any other instructions you are given.  · Rest: Cut down on running and high-impact sports, or avoid them completely to allow your legs to rest and the injury to heal.  · Ice: Put ice on the painful areas. Use an ice pack or bag of frozen peas. Put a thin cloth between the cold source and your skin. Ice for 15 minutes every 3 hours.  · Medications: Take nonsteroidal anti-inflammatory medicines (NSAIDs), such as ibuprofen, as directed by your doctor.  Preventing shin splints  To help prevent shin splints in the future:  · Warm up before you run. Do gentle calf-stretching exercises.  · Be careful not to overtrain.  · Avoid running on hard or uneven surfaces.  · If you have flat feet or low arches, consider orthotics or insoles for correction.  Be sure you are using running shoes with good support and cushioned soles.  Date Last Reviewed: 11/10/2015  © 3734-2149 [employer]. [address] [place] [address], [place] [address]. All rights reserved. This information is not intended as a substitute for professional medical care. Always follow your healthcare professional's instructions.

## 2019-02-09 NOTE — PATIENT INSTRUCTIONS
You must understand that you've received an Urgent Care treatment only and that you may be released before all your medical problems are known or treated. You, the patient, will arrange for follow up care as instructed.  If your condition worsens we recommend that you receive another evaluation at the emergency room immediately or contact your primary medical clinics after hours call service to discuss your concerns.  Please return here or go to the Emergency Department for any concerns or worsening of condition.      Shin Splints (Medial Tibial Stress Syndrome)  Pain felt in the front of your lower leg is often called shin splints. One common cause of this pain is tendinitis--inflammation of tendons (tough, cordlike bands of tissue that connect muscle to bone). When the tendons of the muscles near the shinbone (tibia) become inflamed, the pain is felt along the shin. Shin splints often affect athletes and runners, and are commonly due to overuse. A less common cause is flat feet with low arches.  Symptoms of shin splints  Symptoms of shin splints often start as a dull ache that gets worse over time. Pain may also be sharp or stabbing. Resting your legs often relieves the symptoms. Pain may occur both during or after activity. Later, the pain may become continuous with almost any activity.  Your evaluation  Your doctor will ask you questions about your activities and your health history. Be sure to tell your doctor about possible injuries. The diagnosis is usually made through the history and physical exam. There are no tests for shin splints, but your doctor may want to do some tests to rule out a stress fracture in your shinbone. These tests may include an X-ray, bone scan, or MRI (magnetic resonance imaging) test.    Treating shin splints  Follow these and any other instructions you are given.  · Rest: Cut down on running and high-impact sports, or avoid them completely to allow your legs to rest and the  injury to heal.  · Ice: Put ice on the painful areas. Use an ice pack or bag of frozen peas. Put a thin cloth between the cold source and your skin. Ice for 15 minutes every 3 hours.  · Medications: Take nonsteroidal anti-inflammatory medicines (NSAIDs), such as ibuprofen, as directed by your doctor.  Preventing shin splints  To help prevent shin splints in the future:  · Warm up before you run. Do gentle calf-stretching exercises.  · Be careful not to overtrain.  · Avoid running on hard or uneven surfaces.  · If you have flat feet or low arches, consider orthotics or insoles for correction.  Be sure you are using running shoes with good support and cushioned soles.  Date Last Reviewed: 11/10/2015  © 3314-6554 [employer]. [address] [place] [address], [place] [address]. All rights reserved. This information is not intended as a substitute for professional medical care. Always follow your healthcare professional's instructions.

## 2019-06-17 ENCOUNTER — OFFICE VISIT (OUTPATIENT)
Dept: OBSTETRICS AND GYNECOLOGY | Facility: CLINIC | Age: 39
End: 2019-06-17
Payer: COMMERCIAL

## 2019-06-17 VITALS
HEIGHT: 61 IN | SYSTOLIC BLOOD PRESSURE: 112 MMHG | BODY MASS INDEX: 31.23 KG/M2 | WEIGHT: 165.38 LBS | DIASTOLIC BLOOD PRESSURE: 74 MMHG

## 2019-06-17 DIAGNOSIS — N39.46 MIXED STRESS AND URGE URINARY INCONTINENCE: ICD-10-CM

## 2019-06-17 DIAGNOSIS — Z01.419 WELL WOMAN EXAM WITH ROUTINE GYNECOLOGICAL EXAM: Primary | ICD-10-CM

## 2019-06-17 DIAGNOSIS — Z30.015 ENCOUNTER FOR INITIAL PRESCRIPTION OF VAGINAL RING HORMONAL CONTRACEPTIVE: ICD-10-CM

## 2019-06-17 PROCEDURE — 99395 PR PREVENTIVE VISIT,EST,18-39: ICD-10-PCS | Mod: S$GLB,,, | Performed by: OBSTETRICS & GYNECOLOGY

## 2019-06-17 PROCEDURE — 99999 PR PBB SHADOW E&M-EST. PATIENT-LVL III: CPT | Mod: PBBFAC,,, | Performed by: OBSTETRICS & GYNECOLOGY

## 2019-06-17 PROCEDURE — 99999 PR PBB SHADOW E&M-EST. PATIENT-LVL III: ICD-10-PCS | Mod: PBBFAC,,, | Performed by: OBSTETRICS & GYNECOLOGY

## 2019-06-17 PROCEDURE — 99395 PREV VISIT EST AGE 18-39: CPT | Mod: S$GLB,,, | Performed by: OBSTETRICS & GYNECOLOGY

## 2019-06-17 RX ORDER — ETONOGESTREL AND ETHINYL ESTRADIOL VAGINAL RING .015; .12 MG/D; MG/D
1 RING VAGINAL
Qty: 1 EACH | Refills: 12 | Status: SHIPPED | OUTPATIENT
Start: 2019-06-17 | End: 2020-05-26 | Stop reason: SDUPTHER

## 2019-06-17 NOTE — PATIENT INSTRUCTIONS
Kegel Exercises  Kegel exercises dont need special clothing or equipment. Theyre easy to learn and simple to do. And if you do them right, no one can tell youre doing them, so they can be done almost anywhere. Your healthcare provider, nurse, or physical therapist can answer any questions you have and help you get started.    A weak pelvic floor   The pelvic floor muscles may weaken due to aging, pregnancy and vaginal childbirth, injury, surgery, chronic cough, or lack of exercise. If the pelvic floor is weak, your bladder and other pelvic organs may sag out of place. The urethra may also open too easily and allow urine to leak out. Kegel exercises can help you strengthen your pelvic floor muscles. Then they can better support the pelvic organs and control urine flow.  How Kegel exercises are done  Try each of the Kegel exercises described below. When youre doing them, try not to move your leg, buttock, or stomach muscles.  · Contract as if you were stopping your urine stream. But do it when youre not urinating.  · Tighten your rectum as if trying not to pass gas. Contract your anus, but dont move your buttocks.  · You may place a finger or 2 in the vagina and squeeze your finger with your vagina to learn which muscles to tighten.  Try to hold each Kegel for a slow count to 5. You probably wont be able to hold them for that long at first. But keep practicing. It will get easier as your pelvic floor gets stronger. Eventually, special weights that you place in your vagina may be recommended to help make your Kegels even more effective. Visit your healthcare provider if you have difficulties doing Kegel exercises.  Helpful hints  Here are some tips to follow:  · Do your Kegels as often as you can. The more you do them, the faster youll feel the results.  · Pick an activity you do often as a reminder. For instance, do your Kegels every time you sit down.  · Tighten your pelvic floor before you sneeze, get up  from a chair, cough, laugh, or lift. This protects your pelvic floor from injury and can help prevent urine leakage.   Date Last Reviewed: 8/5/2015 © 2000-2017 [employer]. [address] [place] [address], [place] [address]. All rights reserved. This information is not intended as a substitute for professional medical care. Always follow your healthcare professional's instructions.    Bladder Irritants  Certain foods and drinks have been associated with worsening symptoms of urinary frequency, urgency, urge incontinence, or bladder pain. If you suffer from any of these conditions, you may wish to try eliminating one or more of these foods from your diet and see if your symptoms improve. If bladder symptoms are related to dietary factors, strict adherence to a diet thateliminates the food should bring marked relief in 10 days. Once you are feeling better, you can begin to add foods back into your diet, one at a time. If symptoms return, you will be able to identify the irritant. As you add foods back to your diet it is very important that you drink significant amounts of water.    -----------------------------------------------------------------------------------------------  List of Common Bladder Irritants*  Alcoholic beverages  Apples and apple juice  Cantaloupe  Carbonated beverages  Chili and spicy foods  Chocolate  Citrus fruit  Coffee (including decaffeinated)  Cranberries and cranberry juice  Grapes  Guava  Milk Products: milk, cheese, cottage cheese, yogurt, ice cream  Peaches  Pineapple  Plums  Strawberries  Sugar especially artificial sweeteners, saccharin, aspartame, corn sweeteners, honey, fructose, sucrose, lactose  Tea  Tomatoes and tomato juice  Vitamin B complex  Vinegar  *Most people are not sensitive to ALL of these products; your goal is to find the foods that make YOUR symptoms  worse.  ---------------------------------------------------------------------------------------------------    Low-acid fruit substitutions include apricots, papaya, pears and watermelon. Coffee drinkers can drink Kava or other lowacid instant drinks. Tea drinkers can substitute non-citrus herbal and sun brewed teas. Calcium carbonate co-buffered with calcium ascorbate can be substituted for Vitamin C. Prelief is a dietary supplement that works as an acid blocker for the bladder.    Where to get more information:        Overcoming Bladder Disorders by Judit Abdullahi and Sharon Bennett, 1990        You Dont Have to Live with Cystitis! By Nicky Palomo, 1988  · [email or web address]

## 2019-06-17 NOTE — PROGRESS NOTES
History & Physical  Gynecology      SUBJECTIVE:     Chief Complaint: Annual Exam       History of Present Illness:  Annual Exam-Premenopausal  Patient presents for annual exam. The patient has no complaints today. Menstrual cycles are once a month, regular.  The patient is sexually active. GYN screening history: last pap: approximate date  (ASCUS with NEGATIVE hpv) and was normal. The patient participates in regular exercise: yes.  The patient does not smoke.      Contraception: condoms    FH:  Breast cancer: paternal grandmother  Colon cancer: neg  Ovarian cancer: neg    Review of patient's allergies indicates:  No Known Allergies    Past Medical History:   Diagnosis Date    Abnormal Pap smear 12 years ago    Cryotherapy    Abnormal Pap smear of cervix     Cervical radiculopathy 2015    Cervical radiculopathy 2015    Enlarged thyroid gland 2015    GERD (gastroesophageal reflux disease)     Obesity (BMI 30-39.9)     Right-sided carotid artery disease 2016     Past Surgical History:   Procedure Laterality Date    APPENDECTOMY      EGD (ESOPHAGOGASTRODUODENOSCOPY) N/A 2018    Per[facility] by Karl Judge [facility] at [facility] (4TH FLR)    INGUINAL HERNIA REPAIR Right     MYRINGOTOMY W/ TUBES      REPAIR-HERNIA-INGUINAL Open Right with Mesh Right 2017    Performed by Jessu Messina MD at [facility] OR 2ND FLR     OB History        0    Para   0    Term   0       0    AB   0    Living   0       SAB   0    TAB   0    Ectopic   0    Multiple   0    Live Births                   Family History   Problem Relation Age of Onset    Heart disease Mother 40    Diabetes Mother     Depression Mother     Anxiety disorder Mother     Obesity Mother     Hypertension Father     Diabetes Father     Obesity Father     Kidney disease Father     Heart disease Maternal Grandmother     Diabetes Maternal Grandmother     Diabetes Maternal Grandfather     Cancer  Paternal Grandmother 58        Breast    Diabetes Paternal Grandmother     Breast cancer Paternal Grandmother     Gestational diabetes Sister     Asthma Sister         as a child    Stroke Neg Hx     Colon cancer Neg Hx     Ovarian cancer Neg Hx      Social History     Tobacco Use    Smoking status: Never Smoker    Smokeless tobacco: Never Used   Substance Use Topics    Alcohol use: Yes     Alcohol/week: 0.0 oz     Comment: social    Drug use: No       Current Outpatient Medications   Medication Sig    etonogestrel-ethinyl estradiol (NUVARING) 0.12-0.015 mg/24 hr vaginal ring Place 1 each vaginally every 28 days.    hyoscyamine (LEVSIN/SL) 0.125 mg Subl Place 1 tablet (0.125 mg total) under the tongue every 4 (four) hours as needed (chest pain).    montelukast (SINGULAIR) 10 mg tablet Take 10 mg by mouth every evening.    pantoprazole (PROTONIX) 40 MG tablet Take 1 tablet (40 mg total) by mouth once daily.     No current facility-administered medications for this visit.          Review of Systems:  Review of Systems   Constitutional: Negative for activity change, appetite change and fever.   Respiratory: Negative for shortness of breath.    Cardiovascular: Negative for chest pain.   Gastrointestinal: Negative for abdominal pain, constipation, diarrhea, nausea and vomiting.   Genitourinary: Positive for bladder incontinence. Negative for menorrhagia, menstrual problem, pelvic pain, vaginal bleeding, vaginal discharge and vaginal pain.   Integumentary:  Negative for nipple discharge.   Neurological: Negative for numbness and headaches.   Breast: Negative for mastodynia and nipple discharge       OBJECTIVE:     Physical Exam:  Physical Exam   Constitutional: She is oriented to person, place, and time. She appears well-developed and well-nourished.   Neck: Normal range of motion. Neck supple. No tracheal deviation present. No thyromegaly present.   Cardiovascular: Normal rate, regular rhythm and normal  heart sounds.   Pulmonary/Chest: Effort normal and breath sounds normal. Right breast exhibits no inverted nipple, no mass, no nipple discharge, no skin change and no tenderness. Left breast exhibits no inverted nipple, no mass, no nipple discharge, no skin change and no tenderness. Breasts are symmetrical.   Abdominal: Soft.   Genitourinary: Vagina normal and uterus normal. No labial fusion. There is no rash, tenderness, lesion or injury on the right labia. There is no rash, tenderness, lesion or injury on the left labia. Uterus is not deviated, not enlarged, not fixed and not tender. Cervix exhibits no motion tenderness, no discharge and no friability. Right adnexum displays no mass, no tenderness and no fullness. Left adnexum displays no mass, no tenderness and no fullness. No erythema, tenderness or bleeding in the vagina. No foreign body in the vagina. No signs of injury around the vagina. No vaginal discharge found.   Genitourinary Comments: Urethra: normal appearing urethra with no masses, tenderness or lesions  Urethral meatus: normal size, anterior vaginal wall with no prolapse, no lesions   Neurological: She is alert and oriented to person, place, and time.   Psychiatric: She has a normal mood and affect. Her behavior is normal. Judgment and thought content normal.   Nursing note and vitals reviewed.      Chaperoned by: Abi    ASSESSMENT:       ICD-10-CM ICD-9-CM    1. Well woman exam with routine gynecological exam Z01.419 V72.31    2. Encounter for initial prescription of vaginal ring hormonal contraceptive Z30.015 V25.02 etonogestrel-ethinyl estradiol (NUVARING) 0.12-0.015 mg/24 hr vaginal ring   3. Mixed stress and urge urinary incontinence N39.46 788.33           Plan:      Laura was seen today for annual exam.    Diagnoses and all orders for this visit:    Well woman exam with routine gynecological exam    Encounter for initial prescription of vaginal ring hormonal contraceptive  -      etonogestrel-ethinyl estradiol (NUVARING) 0.12-0.015 mg/24 hr vaginal ring; Place 1 each vaginally every 28 days.    Mixed stress and urge urinary incontinence        No orders of the defined types were placed in this encounter.      Well Woman:  - Pap smear up to date  - Birth control: ordered  - GC/CT:n/a  - Mammogram: due age 42  - Smoking cessation: n/a  - Labs: none required   - Vaccines: none required  - Exercise counseling    Mixed urinary incontinence  - discussed voiding diary, kegel exercises, stress vs urge, medication and surgery options  - given bladder irritants and sheet on kegel exercises    - The use of the estrogen containing contraception has been fully discussed with the patient. This includes the proper method to initiate and continue the drug, the need for regular compliance to ensure adequate contraceptive effect, the physiology which make the pill/patch/ring effective, the instructions for what to do in event of a missed or delayed dosage, and warnings about anticipated minor side effects such as breakthrough spotting, nausea, breast tenderness, weight changes, acne, headaches, etc.    - We also discussed the more serious potential side effects such as MI, stroke, and deep vein thrombosis, all of which are very unlikely.  She has been asked to report any signs of such serious problems immediately.  She was advised to use back up during the first cycle. The need for additional protection, such as a condom, to prevent exposure to sexually transmitted diseases has also been discussed- the patient has been clearly reminded that the pill/patch/ring cannot protect her against diseases such as HIV and others. She understands and wishes to take the medication as prescribed.      Follow up in one year for annual or prn.    Cyndy Murcia

## 2019-11-26 ENCOUNTER — OFFICE VISIT (OUTPATIENT)
Dept: INTERNAL MEDICINE | Facility: CLINIC | Age: 39
End: 2019-11-26
Payer: COMMERCIAL

## 2019-11-26 VITALS
SYSTOLIC BLOOD PRESSURE: 122 MMHG | DIASTOLIC BLOOD PRESSURE: 78 MMHG | HEART RATE: 77 BPM | HEIGHT: 61 IN | WEIGHT: 166.25 LBS | OXYGEN SATURATION: 100 % | BODY MASS INDEX: 31.39 KG/M2 | TEMPERATURE: 98 F

## 2019-11-26 DIAGNOSIS — F41.9 CHRONIC ANXIETY: ICD-10-CM

## 2019-11-26 DIAGNOSIS — Z13.1 SCREENING FOR DIABETES MELLITUS: ICD-10-CM

## 2019-11-26 DIAGNOSIS — Z91.89 AT RISK FOR CORONARY ARTERY DISEASE: ICD-10-CM

## 2019-11-26 DIAGNOSIS — Z00.00 ANNUAL PHYSICAL EXAM: Primary | ICD-10-CM

## 2019-11-26 DIAGNOSIS — Z13.220 SCREENING FOR LIPID DISORDERS: ICD-10-CM

## 2019-11-26 LAB
ALBUMIN SERPL BCP-MCNC: 4.1 G/DL (ref 3.5–5.2)
ALP SERPL-CCNC: 66 U/L (ref 55–135)
ALT SERPL W/O P-5'-P-CCNC: 23 U/L (ref 10–44)
ANION GAP SERPL CALC-SCNC: 11 MMOL/L (ref 8–16)
AST SERPL-CCNC: 21 U/L (ref 10–40)
BASOPHILS # BLD AUTO: 0.01 K/UL (ref 0–0.2)
BASOPHILS NFR BLD: 0.2 % (ref 0–1.9)
BILIRUB SERPL-MCNC: 0.4 MG/DL (ref 0.1–1)
BUN SERPL-MCNC: 14 MG/DL (ref 6–20)
CALCIUM SERPL-MCNC: 9.5 MG/DL (ref 8.7–10.5)
CHLORIDE SERPL-SCNC: 105 MMOL/L (ref 95–110)
CHOLEST SERPL-MCNC: 167 MG/DL (ref 120–199)
CHOLEST/HDLC SERPL: 3.1 {RATIO} (ref 2–5)
CO2 SERPL-SCNC: 25 MMOL/L (ref 23–29)
CREAT SERPL-MCNC: 0.7 MG/DL (ref 0.5–1.4)
DIFFERENTIAL METHOD: ABNORMAL
EOSINOPHIL # BLD AUTO: 0.1 K/UL (ref 0–0.5)
EOSINOPHIL NFR BLD: 1.9 % (ref 0–8)
ERYTHROCYTE [DISTWIDTH] IN BLOOD BY AUTOMATED COUNT: 12.6 % (ref 11.5–14.5)
EST. GFR  (AFRICAN AMERICAN): >60 ML/MIN/1.73 M^2
EST. GFR  (NON AFRICAN AMERICAN): >60 ML/MIN/1.73 M^2
ESTIMATED AVG GLUCOSE: 103 MG/DL (ref 68–131)
GLUCOSE SERPL-MCNC: 59 MG/DL (ref 70–110)
HBA1C MFR BLD HPLC: 5.2 % (ref 4–5.6)
HCT VFR BLD AUTO: 41.7 % (ref 37–48.5)
HDLC SERPL-MCNC: 54 MG/DL (ref 40–75)
HDLC SERPL: 32.3 % (ref 20–50)
HGB BLD-MCNC: 13.1 G/DL (ref 12–16)
IMM GRANULOCYTES # BLD AUTO: 0.02 K/UL (ref 0–0.04)
IMM GRANULOCYTES NFR BLD AUTO: 0.3 % (ref 0–0.5)
LDLC SERPL CALC-MCNC: 94.8 MG/DL (ref 63–159)
LYMPHOCYTES # BLD AUTO: 1.9 K/UL (ref 1–4.8)
LYMPHOCYTES NFR BLD: 29.2 % (ref 18–48)
MCH RBC QN AUTO: 29.4 PG (ref 27–31)
MCHC RBC AUTO-ENTMCNC: 31.4 G/DL (ref 32–36)
MCV RBC AUTO: 94 FL (ref 82–98)
MONOCYTES # BLD AUTO: 0.6 K/UL (ref 0.3–1)
MONOCYTES NFR BLD: 9.9 % (ref 4–15)
NEUTROPHILS # BLD AUTO: 3.7 K/UL (ref 1.8–7.7)
NEUTROPHILS NFR BLD: 58.5 % (ref 38–73)
NONHDLC SERPL-MCNC: 113 MG/DL
NRBC BLD-RTO: 0 /100 WBC
PLATELET # BLD AUTO: 221 K/UL (ref 150–350)
PMV BLD AUTO: 11 FL (ref 9.2–12.9)
POTASSIUM SERPL-SCNC: 4.4 MMOL/L (ref 3.5–5.1)
PROT SERPL-MCNC: 8 G/DL (ref 6–8.4)
RBC # BLD AUTO: 4.46 M/UL (ref 4–5.4)
SODIUM SERPL-SCNC: 141 MMOL/L (ref 136–145)
TRIGL SERPL-MCNC: 91 MG/DL (ref 30–150)
TSH SERPL DL<=0.005 MIU/L-ACNC: 1.61 UIU/ML (ref 0.4–4)
WBC # BLD AUTO: 6.38 K/UL (ref 3.9–12.7)

## 2019-11-26 PROCEDURE — 85025 COMPLETE CBC W/AUTO DIFF WBC: CPT

## 2019-11-26 PROCEDURE — 83036 HEMOGLOBIN GLYCOSYLATED A1C: CPT

## 2019-11-26 PROCEDURE — 84443 ASSAY THYROID STIM HORMONE: CPT

## 2019-11-26 PROCEDURE — 80053 COMPREHEN METABOLIC PANEL: CPT

## 2019-11-26 PROCEDURE — 36415 COLL VENOUS BLD VENIPUNCTURE: CPT

## 2019-11-26 PROCEDURE — 99395 PR PREVENTIVE VISIT,EST,18-39: ICD-10-PCS | Mod: S$GLB,,, | Performed by: FAMILY MEDICINE

## 2019-11-26 PROCEDURE — 80061 LIPID PANEL: CPT

## 2019-11-26 PROCEDURE — 99999 PR PBB SHADOW E&M-EST. PATIENT-LVL IV: CPT | Mod: PBBFAC,,, | Performed by: FAMILY MEDICINE

## 2019-11-26 PROCEDURE — 99395 PREV VISIT EST AGE 18-39: CPT | Mod: S$GLB,,, | Performed by: FAMILY MEDICINE

## 2019-11-26 PROCEDURE — 99999 PR PBB SHADOW E&M-EST. PATIENT-LVL IV: ICD-10-PCS | Mod: PBBFAC,,, | Performed by: FAMILY MEDICINE

## 2019-11-26 RX ORDER — HYDROXYZINE HYDROCHLORIDE 10 MG/1
10 TABLET, FILM COATED ORAL 3 TIMES DAILY PRN
Qty: 30 TABLET | Refills: 1 | Status: SHIPPED | OUTPATIENT
Start: 2019-11-26 | End: 2021-06-15

## 2019-11-26 RX ORDER — PAROXETINE 10 MG/1
10 TABLET, FILM COATED ORAL EVERY MORNING
Qty: 30 TABLET | Refills: 1 | Status: SHIPPED | OUTPATIENT
Start: 2019-11-26 | End: 2019-12-23 | Stop reason: SDUPTHER

## 2019-11-26 NOTE — PROGRESS NOTES
[facility]  [facility]/ Internal Medicine  Clinic Note      Subjective:       Patient ID: Laura Fitzpatrick is a 39 y.o. female.    Chief Complaint: Annual Exam and Anxiety    New patient is here today for annual exam.  She notes worsening chronic anxiety, and would like to start treatment with medication.  She has been undertaking lifestyle changes which have not been entirely helpful, though has not yet tried therapy/counseling.  She endorses prior treatment with depression medication many years ago, but denies dysphoria or mariaelena depression symptoms at this time.  She notes no recent illness and otherwise is at her baseline health, without additional questions or concerns today.  Women's health issues are up to date.  Past medical, surgical, family, social history reviewed.      Anxiety   Presents for initial visit. Onset was 1 to 6 months ago. The problem has been gradually worsening. Symptoms include chest pain (chest tightness), excessive worry, hyperventilation, insomnia, muscle tension, nervous/anxious behavior, obsessions, panic and restlessness. Patient reports no depressed mood, dizziness, nausea, palpitations or shortness of breath. The severity of symptoms is causing significant distress. The symptoms are aggravated by work stress, social activities and family issues.         Review of Systems   Constitutional: Negative for fatigue and fever.   HENT: Negative for congestion, rhinorrhea and sore throat.    Respiratory: Negative for cough and shortness of breath.    Cardiovascular: Positive for chest pain (chest tightness). Negative for palpitations.   Gastrointestinal: Negative for diarrhea, nausea and vomiting.   Genitourinary: Negative for dysuria and menstrual problem.   Skin: Negative for rash and wound.   Neurological: Negative for dizziness, syncope and headaches.   Psychiatric/Behavioral: Negative for dysphoric mood and sleep disturbance. The patient is  "nervous/anxious and has insomnia.        Objective:      /78 (BP Location: Left arm, Patient Position: Sitting, BP Method: Large (Manual))   Pulse 77   Temp 97.8 °F (36.6 °C) (Oral)   Ht 5' 1" (1.549 m)   Wt 75.4 kg (166 lb 3.6 oz)   LMP 11/13/2019 (Exact Date)   SpO2 100%   BMI 31.41 kg/m²   Physical Exam   Constitutional: She is oriented to person, place, and time. She appears well-developed and well-nourished. No distress.   HENT:   Head: Normocephalic and atraumatic.   Neck: Normal range of motion. No thyromegaly present.   Cardiovascular: Normal rate and regular rhythm.   No murmur heard.  Pulmonary/Chest: Effort normal and breath sounds normal. No respiratory distress. She has no wheezes. She has no rales.   Abdominal: Soft. Bowel sounds are normal. She exhibits no distension. There is no tenderness.   Musculoskeletal: Normal range of motion. She exhibits no edema.   Neurological: She is alert and oriented to person, place, and time. No cranial nerve deficit or sensory deficit. She exhibits normal muscle tone.   Skin: Skin is warm and dry. No rash noted.   Psychiatric: She has a normal mood and affect.   Vitals reviewed.      Assessment:       1. Annual physical exam    2. Screening for diabetes mellitus    3. Screening for lipid disorders    4. Chronic anxiety    5. At risk for coronary artery disease        Plan:     1. Annual physical exam  2. Screening for diabetes mellitus  3. Screening for lipid disorders  - health history reviewed, USPSTF preventive health recommendations based on age reviewed and tests ordered below  - - Comprehensive metabolic panel  - - Lipid panel  - - Hemoglobin A1c  - routine health maintenance counseling provided and healthy diet/exercise recommendations reviewed  - adult immunization counseling reviewed     4. Chronic anxiety  - history reviewed, chronic worsening anxiety, without much in the way of depression symptoms, support and empathy provided  - - CBC auto " differential  - - TSH  - non-pharmacologic treatment measures reviewed, including benefit of routine/structured exercise program and therapy/counseling   - treatment options reviewed, will start trial of SSRI and safe as-needed anxiolytic, basic pharmacology reviewed, dosing instructions and potential side effects reviewed  - - paroxetine (PAXIL) 10 MG tablet; Take 1 tablet (10 mg total) by mouth every morning.  Dispense: 30 tablet; Refill: 1  - - hydrOXYzine HCl (ATARAX) 10 MG Tab; Take 1 tablet (10 mg total) by mouth 3 (three) times daily as needed (anxiety).  Dispense: 30 tablet; Refill: 1  - questions answered, warning signs reviewed, patient is comfortable with this plan and was encouraged to call the office for any concerns or worsening of condition     5. At risk for coronary artery disease  - no history of chest pain, but reports mom had MI in her early 40's  - we discussed evaluation options, will check fasting lipids and CT coronary calcium score to further stratify risk  - - CT Cardiac Scoring; Future   - if testing is positive, or if chest pain develops, would consider referral to Cardiology to discuss advanced testing    - Follow up in about 4 weeks (around 12/24/2019) for follow-up medication.     Rayo Arreola MD  11/26/2019          Medication List with Changes/Refills   New Medications    HYDROXYZINE HCL (ATARAX) 10 MG TAB    Take 1 tablet (10 mg total) by mouth 3 (three) times daily as needed (anxiety).    PAROXETINE (PAXIL) 10 MG TABLET    Take 1 tablet (10 mg total) by mouth every morning.   Current Medications    ETONOGESTREL-ETHINYL ESTRADIOL (NUVARING) 0.12-0.015 MG/24 HR VAGINAL RING    Place 1 each vaginally every 28 days.    HYOSCYAMINE (LEVSIN/SL) 0.125 MG SUBL    Place 1 tablet (0.125 mg total) under the tongue every 4 (four) hours as needed (chest pain).    MONTELUKAST (SINGULAIR) 10 MG TABLET    Take 10 mg by mouth every evening.    PANTOPRAZOLE (PROTONIX) 40 MG TABLET    Take 1  tablet (40 mg total) by mouth once daily.

## 2019-11-26 NOTE — PATIENT INSTRUCTIONS
Prevention Guidelines, Women Ages 18 to 39  Screening tests and vaccines are an important part of managing your health. Health counseling is essential, too. Below are guidelines for these, for women ages 18 to 39. Talk with your healthcare provider to make sure youre up-to-date on what you need.  Screening Who needs it How often   Alcohol misuse All women in this age group At routine exams   Blood pressure All women in this age group Every 2 years if your blood pressure is less than 120/80 mm Hg; yearly if your systolic blood pressure is 120 to 139 mm Hg, or your diastolic blood pressure reading is 80 to 89 mm Hg   Breast cancer All women in this age group should talk with their healthcare providers about the need for clinical breast exams (CBE)1 Clinical breast exam every 3 years1   Cervical cancer Women ages 21 and older Women between ages 21 and 29 should have a Pap test every 3 years; women between ages 30 and 65 are advised to have a Pap test plus an HPV test every 5 years   Chlamydia Sexually active women ages 24 and younger, and women at increased risk for infection Every 3 years if you're at risk or have symptoms   Depression All women in this age group At routine exams   Diabetes mellitus, type 2 Adults with no symptoms who are overweight or obese and have 1 or more other risk factors for diabetes At least every 3 years   Gonorrhea Sexually active women at increased risk for infection At routine exams   Hepatitis C Anyone at increased risk At routine exams   HIV All women At routine exams   Obesity All women in this age group At routine exams   Syphilis Women at increased risk for infection - talk with your healthcare provider At routine exams   Tuberculosis Women at increased risk for infection - talk with your healthcare provider Ask your healthcare provider   Vision All women in this age group At least 1 complete exam in your 20s, and 2 in your 30s   Vaccine2 Who needs it How often   Chickenpox  (varicella) All women in this age group who have no record of this infection or vaccine 2 doses; the second dose should be given 4 to 8 weeks after the first dose   Hepatitis A Women at increased risk for infection - talk with your healthcare provider 2 doses given at least 6 months apart   Hepatitis B Women at increased risk for infection - talk with your healthcare provider 3 doses over 6 months; second dose should be given 1 month after the first dose; the third dose should be given at least 2 months after the second dose and at least 4 months after the first dose   Haemophilus influenzae Type B (HIB) Women at increased risk for infection - talk with your healthcare provider 1 to 3 doses   Human papillomavirus (HPV) All women in this age group up to age 26 3 doses; the second dose should be given 1 to 2 months after the first dose and the third dose given 6 months after the first dose   Influenza (flu) All women in this age group Once a year   Measles, mumps, rubella (MMR) All women in this age group who have no record of these infections or vaccines 1 or 2 doses   Meningococcal Women at increased risk for infection - talk with your healthcare provider 1 or more doses   Pneumococcal conjugate vaccine (PCV13) and pneumococcal polysaccharide vaccine (PPSV23) Women at increased risk for infection - talk with your healthcare provider PCV13: 1 dose ages 19 to 65 (protects against 13 types of pneumococcal bacteria)  PPSV23: 1 to 2 doses through age 64, or 1 dose at 65 or older (protects against 23 types of pneumococcal bacteria)      Tetanus/diphtheria/pertussis (Td/Tdap) booster All women in this age group Td every 10 years, or a one-time dose of Tdap instead of a Td booster after age 18, then Td every 10 years   Counseling Who needs it How often   BRCA gene mutation testing for breast and ovarian cancer susceptibility Women with increased risk for having gene mutation When your risk is known   Breast cancer and  chemoprevention Women at high risk for breast cancer When your risk is known   Diet and exercise Women who are overweight or obese When diagnosed, and then at routine exams   Domestic violence Women at the age in which they are able to have children At routine exams   Sexually transmitted infection prevention Women who are sexually active At routine exams   Skin cancer Prevention of skin cancer in fair-skinned adults through age 24 At routine exams   Use of tobacco and the health effects it can cause All women in this age group Every visit   1According to the ACS, women ages 20 to 39 years should have a clinical breast exam (CBE) as part of their routine health exam every 3 years. Breast self-exams are an option for women starting in their 20s. But the  USPSTF does not recommend CBE.  2Those who are 18 years old and not up-to-date on their childhood vaccines should get all appropriate catch-up vaccines recommended by the CDC.  Date Last Reviewed: 8/27/2015  © 6304-5766 [employer]. [address] [place] [address], [place] [address]. All rights reserved. This information is not intended as a substitute for professional medical care. Always follow your healthcare professional's instructions.

## 2019-11-27 ENCOUNTER — TELEPHONE (OUTPATIENT)
Dept: INTERNAL MEDICINE | Facility: CLINIC | Age: 39
End: 2019-11-27

## 2019-11-27 NOTE — TELEPHONE ENCOUNTER
"MyChart message sent.  Please let the patient know their results, thank you:    " Hi Ms. Fitzpatrick,    I have reviewed your recent lab results.  Your blood counts are all within acceptable limits and do not show any anemia.  Your electrolytes, blood sugar, and kidney/liver/thyroid function are all normal.  Your screening for diabetes is negative, and your cholesterol levels are all within normal limits.  Keep up the good work with diet and exercise!  Please contact the office if you have any additional questions or concerns.    Rayo Arreola MD      "

## 2019-12-23 ENCOUNTER — OFFICE VISIT (OUTPATIENT)
Dept: INTERNAL MEDICINE | Facility: CLINIC | Age: 39
End: 2019-12-23
Payer: COMMERCIAL

## 2019-12-23 VITALS
HEART RATE: 87 BPM | DIASTOLIC BLOOD PRESSURE: 60 MMHG | BODY MASS INDEX: 32.11 KG/M2 | SYSTOLIC BLOOD PRESSURE: 100 MMHG | OXYGEN SATURATION: 99 % | TEMPERATURE: 98 F | WEIGHT: 170.06 LBS | HEIGHT: 61 IN

## 2019-12-23 DIAGNOSIS — F41.9 CHRONIC ANXIETY: ICD-10-CM

## 2019-12-23 DIAGNOSIS — J32.9 RECURRENT SINUS INFECTIONS: Primary | ICD-10-CM

## 2019-12-23 PROCEDURE — 3008F BODY MASS INDEX DOCD: CPT | Mod: CPTII,S$GLB,, | Performed by: FAMILY MEDICINE

## 2019-12-23 PROCEDURE — 3008F PR BODY MASS INDEX (BMI) DOCUMENTED: ICD-10-PCS | Mod: CPTII,S$GLB,, | Performed by: FAMILY MEDICINE

## 2019-12-23 PROCEDURE — 99999 PR PBB SHADOW E&M-EST. PATIENT-LVL IV: ICD-10-PCS | Mod: PBBFAC,,, | Performed by: FAMILY MEDICINE

## 2019-12-23 PROCEDURE — 99213 PR OFFICE/OUTPT VISIT, EST, LEVL III, 20-29 MIN: ICD-10-PCS | Mod: S$GLB,,, | Performed by: FAMILY MEDICINE

## 2019-12-23 PROCEDURE — 99999 PR PBB SHADOW E&M-EST. PATIENT-LVL IV: CPT | Mod: PBBFAC,,, | Performed by: FAMILY MEDICINE

## 2019-12-23 PROCEDURE — 99213 OFFICE O/P EST LOW 20 MIN: CPT | Mod: S$GLB,,, | Performed by: FAMILY MEDICINE

## 2019-12-23 RX ORDER — PAROXETINE HYDROCHLORIDE 20 MG/1
20 TABLET, FILM COATED ORAL EVERY MORNING
Qty: 30 TABLET | Refills: 1 | Status: SHIPPED | OUTPATIENT
Start: 2019-12-23 | End: 2020-01-10

## 2019-12-23 RX ORDER — AMOXICILLIN AND CLAVULANATE POTASSIUM 875; 125 MG/1; MG/1
1 TABLET, FILM COATED ORAL EVERY 12 HOURS
Qty: 20 TABLET | Refills: 0 | Status: SHIPPED | OUTPATIENT
Start: 2019-12-23 | End: 2020-01-02

## 2019-12-23 NOTE — PROGRESS NOTES
[facility]  [facility] Family Medicine/ Internal Medicine  Clinic Note      Subjective:       Patient ID: Laura Fitzpatrick is a 39 y.o. female.    Chief Complaint: MEDICATION FOLLOW UP, swollen gland right side neck    Patient is here today for follow-up and sick visit.  She was last seen to establish care, wellness labs obtained and results reviewed today, all reassuring.  She had described chronic anxiety, was started on trial of Paxil and notes no response to treatment just yet.  She notes it is causing some fatigue and dry mouth, not yet feeling better, but has only needed one dose of hydroxyzine.  Otherwise, she mentions headache for the last week, and onset of swollen right side of the neck yesterday.  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  She denies fever, describes headache is retroorbital and occipital, was feeling better but now is doing worse.  No dysphagia, no foul-tasting material, and has mild cough.  She notes history of recurrent sinus infections 1-2 times per year, feels this is similar to prior.    Anxiety   Presents for follow-up visit. Onset was 1 to 6 months ago. The problem has been gradually worsening. Symptoms include excessive worry, hyperventilation, insomnia, muscle tension, nervous/anxious behavior, obsessions, panic and restlessness. Patient reports no chest pain, depressed mood, dizziness, nausea, palpitations or shortness of breath. The severity of symptoms is causing significant distress. The symptoms are aggravated by work stress, social activities and family issues.     Treatment side effects: dry mouth and fatigue.     Review of Systems   Constitutional: Negative for fatigue and fever.   HENT: Positive for congestion, ear pain, rhinorrhea, sinus pressure and sinus pain. Negative for sore throat and trouble swallowing.    Respiratory: Positive for cough. Negative for shortness of breath.    Cardiovascular:  "Negative for chest pain and palpitations.   Gastrointestinal: Negative for diarrhea, nausea and vomiting.   Skin: Negative for rash and wound.   Neurological: Positive for headaches. Negative for dizziness and light-headedness.   Psychiatric/Behavioral: Negative for dysphoric mood and sleep disturbance. The patient is nervous/anxious and has insomnia.        Objective:      /60 (BP Location: Right arm, Patient Position: Sitting, BP Method: Large (Manual))   Pulse 87   Temp 98.2 °F (36.8 °C)   Ht 5' 1" (1.549 m)   Wt 77.2 kg (170 lb 1.4 oz)   LMP 12/19/2019   SpO2 99%   BMI 32.14 kg/m²   Physical Exam   Constitutional: She is oriented to person, place, and time. She appears well-developed and well-nourished. No distress.   HENT:   Head: Normocephalic and atraumatic.   Right Ear: Tympanic membrane and ear canal normal. Tympanic membrane is not erythematous and not retracted. No middle ear effusion.   Left Ear: Tympanic membrane and ear canal normal. Tympanic membrane is not erythematous and not retracted.  No middle ear effusion.   Nose: Rhinorrhea present. No mucosal edema. Right sinus exhibits frontal sinus tenderness. Right sinus exhibits no maxillary sinus tenderness. Left sinus exhibits frontal sinus tenderness. Left sinus exhibits no maxillary sinus tenderness.   Mouth/Throat: Oropharynx is clear and moist and mucous membranes are normal. No posterior oropharyngeal edema or posterior oropharyngeal erythema.   Neck: Normal range of motion.   Cardiovascular: Normal rate and regular rhythm.   No murmur heard.  Pulmonary/Chest: Effort normal and breath sounds normal. No tachypnea. No respiratory distress. She has no wheezes. She has no rhonchi. She has no rales.   Abdominal: Soft. Bowel sounds are normal. She exhibits no distension. There is no tenderness.   Musculoskeletal: Normal range of motion.   Lymphadenopathy:     She has no cervical adenopathy.   Neurological: She is alert and oriented to " person, place, and time.   Skin: Skin is warm and dry. No rash noted.   Psychiatric: She has a normal mood and affect.   Vitals reviewed.      Assessment:       1. Recurrent sinus infections    2. Chronic anxiety        Plan:     1. Chronic anxiety  - chronic condition, is not yet well-controlled, has started trial of Paxil but notes no response just yet  - non-pharmacologic treatment measures reviewed, including benefit of routine/structured exercise program and therapy/counseling  - treatment options reviewed, we discussed option of increasing dose of her current regimen versus a trial of Zoloft as alternate therapy, patient prefers the former and refill provided   - - paroxetine (PAXIL) 20 MG tablet; Take 1 tablet (20 mg total) by mouth every morning.  Dispense: 30 tablet; Refill: 1  - follow clinical response, if patient would like to change to Zoloft have advised she may give us a call, dosing instructions and potential side effects reviewed, handout provided   - questions answered, warning signs reviewed, patient is comfortable with this plan and was encouraged to call the office for any concerns or worsening of condition    2. Recurrent sinus infections  - exam findings reviewed, generally reassuring and without alarm signs/symptoms  - differential reviewed, presentation of prolonged illness is most consistent with acute sinusitis  - basic pathophysiology reviewed  - supportive care measures reviewed, have recommended increased oral fluids and judicious use of OTC cough-cold remedies, handout provided   - treatment options reviewed, will start empiric course of antibiotic and dosing instructions and potential side effects reviewed   - - amoxicillin-clavulanate 875-125mg (AUGMENTIN) 875-125 mg per tablet; Take 1 tablet by mouth every 12 (twelve) hours. for 10 days  Dispense: 20 tablet; Refill: 0    - Follow up next week if needed, for follow-up illness, in 4 weeks to follow-up medication.     Rayo Arreola,  MD  12/23/2019          Medication List with Changes/Refills   New Medications    AMOXICILLIN-CLAVULANATE 875-125MG (AUGMENTIN) 875-125 MG PER TABLET    Take 1 tablet by mouth every 12 (twelve) hours. for 10 days   Current Medications    ETONOGESTREL-ETHINYL ESTRADIOL (NUVARING) 0.12-0.015 MG/24 HR VAGINAL RING    Place 1 each vaginally every 28 days.    HYDROXYZINE HCL (ATARAX) 10 MG TAB    Take 1 tablet (10 mg total) by mouth 3 (three) times daily as needed (anxiety).   Changed and/or Refilled Medications    Modified Medication Previous Medication    PAROXETINE (PAXIL) 20 MG TABLET paroxetine (PAXIL) 10 MG tablet       Take 1 tablet (20 mg total) by mouth every morning.    Take 1 tablet (10 mg total) by mouth every morning.

## 2019-12-23 NOTE — PATIENT INSTRUCTIONS
Sertraline tablets  What is this medicine?  SERTRALINE (SER tra homer) is used to treat depression. It may also be used to treat obsessive compulsive disorder, panic disorder, post-trauma stress, premenstrual dysphoric disorder (PMDD) or social anxiety.  How should I use this medicine?  Take this medicine by mouth with a glass of water. Follow the directions on the prescription label. You can take it with or without food. Take your medicine at regular intervals. Do not take your medicine more often than directed. Do not stop taking this medicine suddenly except upon the advice of your doctor. Stopping this medicine too quickly may cause serious side effects or your condition may worsen.  A special MedGuide will be given to you by the pharmacist with each prescription and refill. Be sure to read this information carefully each time.  Talk to your pediatrician regarding the use of this medicine in children. While this drug may be prescribed for children as young as 7 years for selected conditions, precautions do apply.  What side effects may I notice from receiving this medicine?  Side effects that you should report to your doctor or health care professional as soon as possible:  · allergic reactions like skin rash, itching or hives, swelling of the face, lips, or tongue  · black or bloody stools, blood in the urine or vomit  · fast, irregular heartbeat  · feeling faint or lightheaded, falls  · hallucination, loss of contact with reality  · seizures  · suicidal thoughts or other mood changes  · unusual bleeding or bruising  · unusually weak or tired  · vomiting  Side effects that usually do not require medical attention (report to your doctor or health care professional if they continue or are bothersome):  · change in appetite  · change in sex drive or performance  · diarrhea  · increased sweating  · indigestion, nausea  · tremors  What may interact with this medicine?  Do not take this medicine with any of the  following medications:  · certain medicines for fungal infections like fluconazole, itraconazole, ketoconazole, posaconazole, voriconazole  · cisapride  · disulfiram  · dofetilide  · linezolid  · MAOIs like Carbex, Eldepryl, Marplan, Nardil, and Parnate  · metronidazole  · methylene blue (injected into a vein)  · pimozide  · thioridazine  · ziprasidone  This medicine may also interact with the following medications:  · alcohol  · aspirin and aspirin-like medicines  · certain medicines for depression, anxiety, or psychotic disturbances  · certain medicines for irregular heart beat like flecainide, propafenone  · certain medicines for migraine headaches like almotriptan, eletriptan, frovatriptan, naratriptan, rizatriptan, sumatriptan, zolmitriptan  · certain medicines for sleep  · certain medicines for seizures like carbamazepine, valproic acid, phenytoin  · certain medicines that treat or prevent blood clots like warfarin, enoxaparin, dalteparin  · cimetidine  · digoxin  · diuretics  · fentanyl  · furazolidone  · isoniazid  · lithium  · NSAIDs, medicines for pain and inflammation, like ibuprofen or naproxen  · other medicines that prolong the QT interval (cause an abnormal heart rhythm)  · procarbazine  · rasagiline  · supplements like [place]'s wort, kava kava, valerian  · tolbutamide  · tramadol  · tryptophan  What if I miss a dose?  If you miss a dose, take it as soon as you can. If it is almost time for your next dose, take only that dose. Do not take double or extra doses.  Where should I keep my medicine?  Keep out of the reach of children.  Store at room temperature between 15 and 30 degrees C (59 and 86 degrees F). Throw away any unused medicine after the expiration date.  What should I tell my health care provider before I take this medicine?  They need to know if you have any of these conditions:  · bipolar disorder or a family history of bipolar disorder  · diabetes  · glaucoma  · heart disease  · high  blood pressure  · history of irregular heartbeat  · history of low levels of calcium, magnesium, or potassium in the blood  · if you often drink alcohol  · liver disease  · receiving electroconvulsive therapy  · seizures  · suicidal thoughts, plans, or attempt; a previous suicide attempt by you or a family member  · thyroid disease  · an unusual or allergic reaction to sertraline, other medicines, foods, dyes, or preservatives  · pregnant or trying to get pregnant  · breast-feeding  What should I watch for while using this medicine?  Tell your doctor if your symptoms do not get better or if they get worse. Visit your doctor or health care professional for regular checks on your progress. Because it may take several weeks to see the full effects of this medicine, it is important to continue your treatment as prescribed by your doctor.  Patients and their families should watch out for new or worsening thoughts of suicide or depression. Also watch out for sudden changes in feelings such as feeling anxious, agitated, panicky, irritable, hostile, aggressive, impulsive, severely restless, overly excited and hyperactive, or not being able to sleep. If this happens, especially at the beginning of treatment or after a change in dose, call your health care professional.  You may get drowsy or dizzy. Do not drive, use machinery, or do anything that needs mental alertness until you know how this medicine affects you. Do not stand or sit up quickly, especially if you are an older patient. This reduces the risk of dizzy or fainting spells. Alcohol may interfere with the effect of this medicine. Avoid alcoholic drinks.  Your mouth may get dry. Chewing sugarless gum or sucking hard candy, and drinking plenty of water may help. Contact your doctor if the problem does not go away or is severe.  NOTE:This sheet is a summary. It may not cover all possible information. If you have questions about this medicine, talk to your doctor,  pharmacist, or health care provider. Copyright© 2017 [employer]

## 2020-01-10 ENCOUNTER — OFFICE VISIT (OUTPATIENT)
Dept: INTERNAL MEDICINE | Facility: CLINIC | Age: 40
End: 2020-01-10
Payer: COMMERCIAL

## 2020-01-10 VITALS
HEIGHT: 61 IN | HEART RATE: 75 BPM | OXYGEN SATURATION: 99 % | DIASTOLIC BLOOD PRESSURE: 72 MMHG | WEIGHT: 175.06 LBS | BODY MASS INDEX: 33.05 KG/M2 | SYSTOLIC BLOOD PRESSURE: 110 MMHG

## 2020-01-10 DIAGNOSIS — F41.9 CHRONIC ANXIETY: Primary | ICD-10-CM

## 2020-01-10 PROCEDURE — 3008F BODY MASS INDEX DOCD: CPT | Mod: CPTII,S$GLB,, | Performed by: FAMILY MEDICINE

## 2020-01-10 PROCEDURE — 99213 OFFICE O/P EST LOW 20 MIN: CPT | Mod: S$GLB,,, | Performed by: FAMILY MEDICINE

## 2020-01-10 PROCEDURE — 99999 PR PBB SHADOW E&M-EST. PATIENT-LVL III: ICD-10-PCS | Mod: PBBFAC,,, | Performed by: FAMILY MEDICINE

## 2020-01-10 PROCEDURE — 3008F PR BODY MASS INDEX (BMI) DOCUMENTED: ICD-10-PCS | Mod: CPTII,S$GLB,, | Performed by: FAMILY MEDICINE

## 2020-01-10 PROCEDURE — 99213 PR OFFICE/OUTPT VISIT, EST, LEVL III, 20-29 MIN: ICD-10-PCS | Mod: S$GLB,,, | Performed by: FAMILY MEDICINE

## 2020-01-10 PROCEDURE — 99999 PR PBB SHADOW E&M-EST. PATIENT-LVL III: CPT | Mod: PBBFAC,,, | Performed by: FAMILY MEDICINE

## 2020-01-10 RX ORDER — SERTRALINE HYDROCHLORIDE 50 MG/1
TABLET, FILM COATED ORAL
Qty: 45 TABLET | Refills: 1 | Status: SHIPPED | OUTPATIENT
Start: 2020-01-10 | End: 2020-03-10

## 2020-01-10 NOTE — PROGRESS NOTES
[facility]  [facility] Family Medicine/ Internal Medicine  Clinic Note      Subjective:       Patient ID: Laura Fitzpatrick is a 39 y.o. female.    Chief Complaint: Medication Follow up    Patient is here today for follow-up visit.  She has been seen to follow treatment for chronic anxiety, and currently on trial of Paxil with dose increase at her last visit.  She notes good response to treatment at this point, with decreased anxiety and reduced panic attacks.  However, she notes extreme and worsening fatigue due to the medication, and is now sleeping too much for her to manage.  She notes even after 10+ hours of sleeping, she is still tired and could sleep for longer.  She remembers a trial of Zoloft in high school had produced good effect, does not remember any problems with this medication.    Anxiety   Presents for follow-up visit. Onset was 1 to 6 months ago. The problem has been gradually worsening. Symptoms include nervous/anxious behavior and restlessness. Patient reports no chest pain, depressed mood, dizziness, excessive worry, hyperventilation, insomnia, muscle tension, nausea, obsessions, palpitations, panic or shortness of breath. The severity of symptoms is moderate. The symptoms are aggravated by work stress, social activities and family issues. Sleep quality: now sleeping too much on medication.     Treatment side effects: now sleeping too much on medication.     Review of Systems   Constitutional: Negative for fatigue and fever.   HENT: Positive for congestion, ear pain, rhinorrhea, sinus pressure and sinus pain. Negative for sore throat and trouble swallowing.    Respiratory: Positive for cough. Negative for shortness of breath.    Cardiovascular: Negative for chest pain and palpitations.   Gastrointestinal: Negative for diarrhea, nausea and vomiting.   Skin: Negative for rash and wound.   Neurological: Positive for headaches. Negative for dizziness and light-headedness.  "  Psychiatric/Behavioral: Negative for dysphoric mood and sleep disturbance. The patient is nervous/anxious. The patient does not have insomnia.        Objective:      /72 (BP Location: Left arm, Patient Position: Sitting, BP Method: Medium (Manual))   Pulse 75   Ht 5' 1" (1.549 m)   Wt 79.4 kg (175 lb 0.7 oz)   LMP 12/19/2019   SpO2 99%   BMI 33.07 kg/m²   Physical Exam   Constitutional: She is oriented to person, place, and time. She appears well-developed and well-nourished. No distress.   HENT:   Head: Normocephalic and atraumatic.   Neck: Normal range of motion.   Cardiovascular: Normal rate and regular rhythm.   No murmur heard.  Pulmonary/Chest: Effort normal and breath sounds normal. No respiratory distress. She has no wheezes. She has no rales.   Abdominal: Soft. Bowel sounds are normal. She exhibits no distension. There is no tenderness.   Musculoskeletal: Normal range of motion. She exhibits no edema.   Neurological: She is alert and oriented to person, place, and time. No sensory deficit. She exhibits normal muscle tone.   Skin: Skin is warm and dry. No rash noted.   Psychiatric: She has a normal mood and affect.   Vitals reviewed.      Assessment:       1. Chronic anxiety        Plan:     1. Chronic anxiety  - chronic anxiety, better control with Paxil, but unable to tolerate this medication due to excessive sleeping  - non-pharmacologic treatment measures reviewed, including benefit of routine/structured exercise program and therapy/counseling  - treatment options reviewed, prior had been on trial of Zoloft, will transition over to this medication with a taper up to a dose that is more likely to be therapeutic for her, dosing instructions and potential side effects reviewed  - - sertraline (ZOLOFT) 50 MG tablet; Take 1 tablet (50 mg total) by mouth once daily for 15 days, THEN 2 tablets (100 mg total) once daily for 15 days.  Dispense: 45 tablet; Refill: 1  - - - follow clinical response, " we also discussed potential trial of Effexor if this medication is not effective or poorly tolerated, handout provided  - questions answered, warning signs reviewed, patient is comfortable with this plan and was encouraged to call the office for any concerns or worsening of condition    - Follow up in about 4 weeks (around 2/7/2020) for follow-up medications.     Rayo Arreola MD  1/10/2020          Medication List with Changes/Refills   Current Medications    ETONOGESTREL-ETHINYL ESTRADIOL (NUVARING) 0.12-0.015 MG/24 HR VAGINAL RING    Place 1 each vaginally every 28 days.    HYDROXYZINE HCL (ATARAX) 10 MG TAB    Take 1 tablet (10 mg total) by mouth 3 (three) times daily as needed (anxiety).    PAROXETINE (PAXIL) 20 MG TABLET    Take 1 tablet (20 mg total) by mouth every morning.

## 2020-01-10 NOTE — PATIENT INSTRUCTIONS
Venlafaxine extended-release capsules  What is this medicine?  VENLAFAXINE(JESENIA la fax een) is used to treat depression, anxiety and panic disorder.  How should I use this medicine?  Take this medicine by mouth with a full glass of water. Follow the directions on the prescription label. Do not cut, crush, or chew this medicine. Take it with food. If needed, the capsule may be carefully opened and the entire contents sprinkled on a spoonful of cool applesauce. Swallow the applesauce/pellet mixture right away without chewing and follow with a glass of water to ensure complete swallowing of the pellets. Try to take your medicine at about the same time each day. Do not take your medicine more often than directed. Do not stop taking this medicine suddenly except upon the advice of your doctor. Stopping this medicine too quickly may cause serious side effects or your condition may worsen.  A special MedGuide will be given to you by the pharmacist with each prescription and refill. Be sure to read this information carefully each time.  Talk to your pediatrician regarding the use of this medicine in children. Special care may be needed.  What side effects may I notice from receiving this medicine?  Side effects that you should report to your doctor or health care professional as soon as possible:  · allergic reactions like skin rash, itching or hives, swelling of the face, lips, or tongue  · breathing problems  · changes in vision  · hallucination, loss of contact with reality  · seizures  · suicidal thoughts or other mood changes  · trouble passing urine or change in the amount of urine  · unusual bleeding or bruising  Side effects that usually do not require medical attention (report to your doctor or health care professional if they continue or are bothersome):  · change in sex drive or performance  · constipation  · increased sweating  · loss of appetite  · nausea  · tremors  · weight loss  What may interact with this  medicine?  Do not take this medicine with any of the following medications:  · certain medicines for fungal infections like fluconazole, itraconazole, ketoconazole, posaconazole, voriconazole  · cisapride  · desvenlafaxine  · dofetilide  · dronedarone  · duloxetine  · levomilnacipran  · linezolid  · MAOIs like Carbex, Eldepryl, Marplan, Nardil, and Parnate  · methylene blue (injected into a vein)  · milnacipran  · pimozide  · thioridazine  · ziprasidone  This medicine may also interact with the following medications:  · aspirin and aspirin-like medicines  · certain medicines for depression, anxiety, or psychotic disturbances  · certain medicines for migraine headaches like almotriptan, eletriptan, frovatriptan, naratriptan, rizatriptan, sumatriptan, zolmitriptan  · certain medicines for sleep  · certain medicines that treat or prevent blood clots like dalteparin, enoxaparin, warfarin  · cimetidine  · clozapine  · diuretics  · fentanyl  · furazolidone  · indinavir  · isoniazid  · lithium  · metoprolol  · NSAIDS, medicines for pain and inflammation, like ibuprofen or naproxen  · other medicines that prolong the QT interval (cause an abnormal heart rhythm)  · procarbazine  · rasagiline  · supplements like [place]'s wort, kava kava, valerian  · tramadol  · tryptophan  What if I miss a dose?  If you miss a dose, take it as soon as you can. If it is almost time for your next dose, take only that dose. Do not take double or extra doses.  Where should I keep my medicine?  Keep out of the reach of children.  Store at a controlled temperature between 20 and 25 degrees C (68 degrees and 77 degrees F), in a dry place. Throw away any unused medicine after the expiration date.  What should I tell my health care provider before I take this medicine?  They need to know if you have any of these conditions:  · bleeding disorders  · glaucoma  · heart disease  · high blood pressure  · high cholesterol  · kidney disease  · liver  disease  · low levels of sodium in the blood  · kacie or bipolar disorder  · seizures  · suicidal thoughts, plans, or attempt; a previous suicide attempt by you or a family  · take medicines that treat or prevent blood clots  · thyroid disease  · an unusual or allergic reaction to venlafaxine, desvenlafaxine, other medicines, foods, dyes, or preservatives  · pregnant or trying to get pregnant  · breast-feeding  What should I watch for while using this medicine?  Tell your doctor if your symptoms do not get better or if they get worse. Visit your doctor or health care professional for regular checks on your progress. Because it may take several weeks to see the full effects of this medicine, it is important to continue your treatment as prescribed by your doctor.  Patients and their families should watch out for new or worsening thoughts of suicide or depression. Also watch out for sudden changes in feelings such as feeling anxious, agitated, panicky, irritable, hostile, aggressive, impulsive, severely restless, overly excited and hyperactive, or not being able to sleep. If this happens, especially at the beginning of treatment or after a change in dose, call your health care professional.  This medicine can cause an increase in blood pressure. Check with your doctor for instructions on monitoring your blood pressure while taking this medicine.  You may get drowsy or dizzy. Do not drive, use machinery, or do anything that needs mental alertness until you know how this medicine affects you. Do not stand or sit up quickly, especially if you are an older patient. This reduces the risk of dizzy or fainting spells. Alcohol may interfere with the effect of this medicine. Avoid alcoholic drinks.  Your mouth may get dry. Chewing sugarless gum, sucking hard candy and drinking plenty of water will help. Contact your doctor if the problem does not go away or is severe.  NOTE:This sheet is a summary. It may not cover all  possible information. If you have questions about this medicine, talk to your doctor, pharmacist, or health care provider. Copyright© 2017 [employer]

## 2020-01-28 ENCOUNTER — PATIENT OUTREACH (OUTPATIENT)
Dept: ADMINISTRATIVE | Facility: HOSPITAL | Age: 40
End: 2020-01-28

## 2020-03-09 ENCOUNTER — PATIENT MESSAGE (OUTPATIENT)
Dept: INTERNAL MEDICINE | Facility: CLINIC | Age: 40
End: 2020-03-09

## 2020-03-09 DIAGNOSIS — F41.9 CHRONIC ANXIETY: ICD-10-CM

## 2020-03-10 RX ORDER — SERTRALINE HYDROCHLORIDE 100 MG/1
100 TABLET, FILM COATED ORAL DAILY
Qty: 90 TABLET | Refills: 1 | Status: SHIPPED | OUTPATIENT
Start: 2020-03-10 | End: 2021-06-15

## 2020-03-11 ENCOUNTER — HOSPITAL ENCOUNTER (OUTPATIENT)
Dept: RADIOLOGY | Facility: HOSPITAL | Age: 40
Discharge: HOME | End: 2020-03-11
Attending: FAMILY MEDICINE
Payer: COMMERCIAL

## 2020-03-11 ENCOUNTER — OFFICE VISIT (OUTPATIENT)
Dept: INTERNAL MEDICINE | Facility: CLINIC | Age: 40
End: 2020-03-11
Payer: COMMERCIAL

## 2020-03-11 VITALS
BODY MASS INDEX: 33.99 KG/M2 | SYSTOLIC BLOOD PRESSURE: 118 MMHG | HEIGHT: 61 IN | WEIGHT: 180 LBS | DIASTOLIC BLOOD PRESSURE: 72 MMHG

## 2020-03-11 DIAGNOSIS — M79.2 RADICULAR PAIN OF LEFT UPPER EXTREMITY: ICD-10-CM

## 2020-03-11 DIAGNOSIS — M25.512 ACUTE PAIN OF LEFT SHOULDER: Primary | ICD-10-CM

## 2020-03-11 DIAGNOSIS — M25.512 ACUTE PAIN OF LEFT SHOULDER: ICD-10-CM

## 2020-03-11 PROCEDURE — 73221 MRI JOINT UPR EXTREM W/O DYE: CPT | Mod: 26,LT,, | Performed by: RADIOLOGY

## 2020-03-11 PROCEDURE — 73030 XR SHOULDER COMPLETE 2 OR MORE VIEWS LEFT: ICD-10-PCS | Mod: 26,LT,, | Performed by: RADIOLOGY

## 2020-03-11 PROCEDURE — 73030 X-RAY EXAM OF SHOULDER: CPT | Mod: TC,LT

## 2020-03-11 PROCEDURE — 72040 XR CERVICAL SPINE AP LATERAL: ICD-10-PCS | Mod: 26,,, | Performed by: RADIOLOGY

## 2020-03-11 PROCEDURE — 3008F PR BODY MASS INDEX (BMI) DOCUMENTED: ICD-10-PCS | Mod: CPTII,S$GLB,, | Performed by: FAMILY MEDICINE

## 2020-03-11 PROCEDURE — 72040 X-RAY EXAM NECK SPINE 2-3 VW: CPT | Mod: 26,,, | Performed by: RADIOLOGY

## 2020-03-11 PROCEDURE — 3008F BODY MASS INDEX DOCD: CPT | Mod: CPTII,S$GLB,, | Performed by: FAMILY MEDICINE

## 2020-03-11 PROCEDURE — 73221 MRI SHOULDER WITHOUT CONTRAST LEFT: ICD-10-PCS | Mod: 26,LT,, | Performed by: RADIOLOGY

## 2020-03-11 PROCEDURE — 99213 OFFICE O/P EST LOW 20 MIN: CPT | Mod: S$GLB,,, | Performed by: FAMILY MEDICINE

## 2020-03-11 PROCEDURE — 72040 X-RAY EXAM NECK SPINE 2-3 VW: CPT | Mod: TC

## 2020-03-11 PROCEDURE — 99999 PR PBB SHADOW E&M-EST. PATIENT-LVL III: ICD-10-PCS | Mod: PBBFAC,,, | Performed by: FAMILY MEDICINE

## 2020-03-11 PROCEDURE — 99213 PR OFFICE/OUTPT VISIT, EST, LEVL III, 20-29 MIN: ICD-10-PCS | Mod: S$GLB,,, | Performed by: FAMILY MEDICINE

## 2020-03-11 PROCEDURE — 73221 MRI JOINT UPR EXTREM W/O DYE: CPT | Mod: TC,LT

## 2020-03-11 PROCEDURE — 73030 X-RAY EXAM OF SHOULDER: CPT | Mod: 26,LT,, | Performed by: RADIOLOGY

## 2020-03-11 PROCEDURE — 99999 PR PBB SHADOW E&M-EST. PATIENT-LVL III: CPT | Mod: PBBFAC,,, | Performed by: FAMILY MEDICINE

## 2020-03-11 RX ORDER — CELECOXIB 100 MG/1
100 CAPSULE ORAL 2 TIMES DAILY PRN
Qty: 20 CAPSULE | Refills: 1 | Status: SHIPPED | OUTPATIENT
Start: 2020-03-11 | End: 2020-03-21

## 2020-03-11 RX ORDER — CYCLOBENZAPRINE HCL 5 MG
5 TABLET ORAL 2 TIMES DAILY PRN
Qty: 20 TABLET | Refills: 1 | Status: SHIPPED | OUTPATIENT
Start: 2020-03-11 | End: 2020-03-21

## 2020-03-11 NOTE — PATIENT INSTRUCTIONS
Understanding Rotator Cuff Tendonitis    Tendons are tough tissues that connect muscles to bone. A group of 4 muscles and their tendons form a cuff around the head of the upper arm bone. This is called the rotator cuff. It connects the upper arm to the shoulder blade. It gives the shoulder joint stability and strength.  If tendons are injured or strained, they may become irritated and swollen (inflamed). This is called tendonitis. Rotator cuff tendonitis may cause shoulder pain and loss of function.  What causes rotator cuff tendonitis?  Tendonitis results when the rotator cuff tendons are injured or overworked. The most common cause of injury is repetitive overhead activities. These can be work-related activities such as reaching, pushing, or lifting. Or they can be sports-related activities such as throwing, swimming, or lifting weights.  Symptoms of rotator cuff tendonitis  Pain on the side of the upper arm is the most common symptom. Pain may get worse with overhead movements or when you raise the arm above shoulder level. It may also hurt to lie on the shoulder at night.  Treatment for rotator cuff tendonitis  Treatment may include the following:  · Active rest. This lets the rotator cuff heal. Active rest means using your arm and shoulder, but avoiding activities that cause pain, such as reaching overhead or sleeping on the shoulder.  · Cold packs. Putting ice packs on the shoulder helps reduce swelling and relieve pain.  · Pain medicines. Prescription or over-the-counter pain medicines can help relieve pain and swelling.  · Arm and shoulder exercises. These help keep the shoulder joint mobile as it heals. They also help improve the strength of muscles around the joint.  Possible complications  It might be tempting to stop using your shoulder completely to avoid pain. But doing so may lead to a condition called frozen shoulder. To help prevent this, following instructions you are given for active rest  and for doing exercises to help your shoulder heal.  When to call your healthcare provider  Call your healthcare provider right away if you have any of these:  · Fever of 100.4°F (38°C) or higher, or as directed  · Symptoms that dont get better, or get worse  · New symptoms   Date Last Reviewed: 3/10/2016  © 9095-9639 [employer]. [address] [place] [address], [place] [address]. All rights reserved. This information is not intended as a substitute for professional medical care. Always follow your healthcare professional's instructions.

## 2020-03-11 NOTE — PROGRESS NOTES
"[facility]  [facility] - Family Medicine/ Internal Medicine  Clinic Note      Subjective:       Patient ID: Laura Fitzpatrick is a 39 y.o. female.    Chief Complaint: Shoulder Pain (last tuesday hurt shoulder while weight lifting, has gotten worse since then. )    Patient is here today for an urgent visit.  She notes onset of left shoulder pain one week ago, after working out at the gym.  The pain is new, and now radiating down the left arm with some associated neck pain.  She does not have any history of chronic neck or shoulder pain.    Shoulder Pain    The pain is present in the left shoulder (posterior shoulder, radiating down the left arm). This is a new problem. The current episode started in the past 7 days. The problem has been gradually worsening. The quality of the pain is described as sharp. The pain is severe. Associated symptoms include an inability to bear weight and a limited range of motion. Pertinent negatives include no fever, headaches, joint swelling, numbness or tingling. Associated symptoms comments: No history of neck pain. Exacerbated by: reaching back. She has tried NSAIDS for the symptoms. The treatment provided mild relief.     Review of Systems   Constitutional: Negative for fatigue and fever.   Respiratory: Negative for cough and shortness of breath.    Cardiovascular: Negative for chest pain and palpitations.   Musculoskeletal: Positive for arthralgias and neck pain. Negative for myalgias and neck stiffness.   Neurological: Negative for tingling, weakness, numbness and headaches.       Objective:      /72   Ht 5' 1" (1.549 m)   Wt 81.7 kg (180 lb 0.1 oz)   BMI 34.01 kg/m²   Physical Exam   Constitutional: She is oriented to person, place, and time. She appears well-developed and well-nourished. No distress.   HENT:   Head: Normocephalic and atraumatic.   Neck: Normal range of motion.   Cardiovascular: Normal rate and regular rhythm.   No murmur " heard.  Pulmonary/Chest: Effort normal and breath sounds normal. No respiratory distress. She has no wheezes. She has no rales.   Abdominal: Soft. Bowel sounds are normal.   Musculoskeletal:        Left shoulder: She exhibits decreased range of motion, tenderness, bony tenderness, pain and spasm. She exhibits no effusion, no deformity, normal pulse and normal strength.        Cervical back: She exhibits decreased range of motion, tenderness, pain and spasm. She exhibits no bony tenderness and no deformity.   Focal TTP in posterior left shoulder and along left SCM and left side of neck, no focal muscle trigger points  Left shoulder:  Empty Can: positive  Full Can: negative  Neer/Flanagan: negative  Speed/Yergason: negative  Apley: positive  Cross-arm: negative     Neurological: She is alert and oriented to person, place, and time. No sensory deficit. She exhibits normal muscle tone.   Skin: Skin is warm and dry.   Psychiatric: She has a normal mood and affect.   Vitals reviewed.      Assessment:       1. Acute pain of left shoulder    2. Radicular pain of left upper extremity        Plan:     1. Acute pain of left shoulder  2. Radicular pain of left upper extremity  - history and exam findings reviewed, presentation is most consistent with rotator cuff injury versus muscle strain  - evaluation options reviewed, will check XR with neck imaging to rule out disk herniation, have advised we will need MR to evaluate the soft tissues of the shoulder, patient agrees with plan  - - X-Ray Cervical Spine AP And Lateral; Future   - - X-Ray Shoulder 2 or More Views Left; Future  - - MRI Shoulder Without Contrast Left; Future  - supportive care measures reviewed   - treatment options reviewed, will provide with trial of higher-strength NSAID and muscle relaxant, dosing instructions and potential side effects reviewed, safe NSAID dosing instructions reviewed  - - celecoxib (CELEBREX) 100 MG capsule; Take 1 capsule (100 mg total) by  mouth 2 (two) times daily as needed for Pain.  Dispense: 20 capsule; Refill: 1  - - cyclobenzaprine (FLEXERIL) 5 MG tablet; Take 1 tablet (5 mg total) by mouth 2 (two) times daily as needed for Muscle spasms.  Dispense: 20 tablet; Refill: 1  - follow clinically, referral to physical therapy and/or Orthopedics as indicated  - questions answered, warning signs reviewed, patient is comfortable with this plan and was encouraged to call the office for any concerns or worsening of condition     - Follow up in 1-2 weeks, for follow-up shoulder pain.     Rayo Arreola MD  3/12/2020          Medication List with Changes/Refills   New Medications    CELECOXIB (CELEBREX) 100 MG CAPSULE    Take 1 capsule (100 mg total) by mouth 2 (two) times daily as needed for Pain.    CYCLOBENZAPRINE (FLEXERIL) 5 MG TABLET    Take 1 tablet (5 mg total) by mouth 2 (two) times daily as needed for Muscle spasms.   Current Medications    ETONOGESTREL-ETHINYL ESTRADIOL (NUVARING) 0.12-0.015 MG/24 HR VAGINAL RING    Place 1 each vaginally every 28 days.    HYDROXYZINE HCL (ATARAX) 10 MG TAB    Take 1 tablet (10 mg total) by mouth 3 (three) times daily as needed (anxiety).    SERTRALINE (ZOLOFT) 100 MG TABLET    Take 1 tablet (100 mg total) by mouth once daily.

## 2020-03-12 ENCOUNTER — TELEPHONE (OUTPATIENT)
Dept: INTERNAL MEDICINE | Facility: CLINIC | Age: 40
End: 2020-03-12

## 2020-03-12 NOTE — TELEPHONE ENCOUNTER
"T[employer] message sent.  Please let the patient know their results, thank you:    " Hi Ms. Fitzpatrick,    I have reviewed your recent X-ray results.  Your shoulder imaging is entirely normal, which is what we were expecting, and we will need to see what your follow-up MRI shows in terms of the potential rotator cuff injury.  Your neck X-ray is showing mild signs of arthritis, but no acute injury, and nothing to suggest a disk herniation at this time.  Please contact the office if you have any additional questions or concerns.    Rayo Arreola MD      "

## 2020-03-13 ENCOUNTER — PATIENT MESSAGE (OUTPATIENT)
Dept: INTERNAL MEDICINE | Facility: CLINIC | Age: 40
End: 2020-03-13

## 2020-03-16 ENCOUNTER — TELEPHONE (OUTPATIENT)
Dept: INTERNAL MEDICINE | Facility: CLINIC | Age: 40
End: 2020-03-16

## 2020-03-16 DIAGNOSIS — S46.012D TRAUMATIC INCOMPLETE TEAR OF LEFT ROTATOR CUFF, SUBSEQUENT ENCOUNTER: Primary | ICD-10-CM

## 2020-03-16 NOTE — TELEPHONE ENCOUNTER
"Referrals placed, please help patient to make appointments with Sports Medicine and PT, thank you.    MyPerm[employer] message sent.  Please let the patient know their results, thank you:    " Hi Ms. Fitzpatrick,    I have reviewed your recent results.  Your MRI is showing a mild tear of the rotator cuff, as well as some associated inflammation.  I have placed a referral for you to see the Sports Medicine specialist to further discuss treatment options, as well as the physical therapist to get treatment started if you would like.  I'll ask our office to call you with instructions on how to make these appointments.  Please contact the office if you have any additional questions or concerns.    Rayo Arreola MD      "

## 2020-03-16 NOTE — TELEPHONE ENCOUNTER
MRI results reviewed and D[employer]App message sent to patient, see telephone encounter with referral to PT and Sports Medicine.

## 2020-03-16 NOTE — TELEPHONE ENCOUNTER
Patient read portal message.     LM for the patient to call the office to help patient schedule sport medicine. PT will give patient a call

## 2020-03-17 ENCOUNTER — PATIENT MESSAGE (OUTPATIENT)
Dept: SPORTS MEDICINE | Facility: CLINIC | Age: 40
End: 2020-03-17

## 2020-04-07 ENCOUNTER — CLINICAL SUPPORT (OUTPATIENT)
Dept: REHABILITATION | Facility: HOSPITAL | Age: 40
End: 2020-04-07
Attending: FAMILY MEDICINE
Payer: COMMERCIAL

## 2020-04-07 DIAGNOSIS — M25.512 ACUTE PAIN OF LEFT SHOULDER: ICD-10-CM

## 2020-04-07 DIAGNOSIS — R53.1 DECREASED STRENGTH: ICD-10-CM

## 2020-04-07 DIAGNOSIS — S46.012D TRAUMATIC INCOMPLETE TEAR OF LEFT ROTATOR CUFF, SUBSEQUENT ENCOUNTER: ICD-10-CM

## 2020-04-07 DIAGNOSIS — Z74.09 IMPAIRED FUNCTIONAL MOBILITY AND ACTIVITY TOLERANCE: ICD-10-CM

## 2020-04-07 DIAGNOSIS — M25.612 DECREASED RANGE OF MOTION OF LEFT SHOULDER: Primary | ICD-10-CM

## 2020-04-07 PROCEDURE — 97110 THERAPEUTIC EXERCISES: CPT | Mod: PO

## 2020-04-07 PROCEDURE — 97161 PT EVAL LOW COMPLEX 20 MIN: CPT | Mod: PO

## 2020-04-07 NOTE — PLAN OF CARE
[facility]  Physical Therapy Initial Evaluation    Date: 4/7/2020   Name: Laura Fitzpatrick  Clinic Number: [id number]    Therapy Diagnosis:   Encounter Diagnoses   Name Primary?    Traumatic incomplete tear of left rotator cuff, subsequent encounter     Acute pain of left shoulder     Decreased range of motion of left shoulder Yes    Decreased strength     Impaired functional mobility and activity tolerance      Physician: Rayo Arreola MD    Physician Orders: PT Eval and Treat   Medical Diagnosis from Referral: Traumatic incomplete tear of left rotator cuff, subsequent encounter  Evaluation Date: 4/7/2020  Authorization Period Expiration: 12/31/20  Plan of Care Expiration: 6/5/20  Visit # / Visits authorized: 1/ 25    Time In: 11:00 AM  Time Out: !2:00 PM  Total Appointment Time (timed & untimed codes): 60 minutes    Precautions: Standard    Subjective   Date of onset: beginning of March  History of current condition - Laura reports: she hurt her left shoulder during a gym class. Pt states she does body pump and other strength classes. Pt states she initially had twinges of pain and then worked out again and felt more pain, so she stopped. Pt states she thought resting it would help, but pain is still about the same. 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 Pt saw Dr. Arreola mid-month, and pt had MRI and XR of shoulder- revealed mild tear in L supraspinatus. MD referred her to sports medicine and PT. Pt also received prescription for celebrex. Pt states Ibuprofen works better. Pt denies prior shoulder injury or surgery, but she states she has a history of L neck pain which has worsened since this started. Pt is R handed. Pt states she has been getting intermittent shooting pain down ulnar side of left arm and into 4th and 5th digits. Sometimes she gets numbness.     Medical History:    Past Medical History:   Diagnosis Date    Abnormal Pap smear 12 years ago    Cryotherapy    Abnormal Pap smear of cervix     Cervical radiculopathy 4/29/2015    Cervical radiculopathy 4/29/2015    Enlarged thyroid gland 1/26/2015    GERD (gastroesophageal reflux disease)     Obesity (BMI 30-39.9)     Right-sided carotid artery disease 2/8/2016       Surgical History:   Laura Fitzpatrick  has a past surgical history that includes Appendectomy (2011); Myringotomy w/ tubes; Inguinal hernia repair (Right, 2017); and Esophagogastroduodenoscopy (N/A, 9/20/2018).    Medications:   Laura has a current medication list which includes the following prescription(s): etonogestrel-ethinyl estradiol, hydroxyzine hcl, and sertraline.    Allergies:   Review of patient's allergies indicates:   Allergen Reactions    Wellbutrin [bupropion hcl] Anxiety        Imaging, MRI studies: Mild low-grade bursal surface fraying of the supraspinatus tendon.  Mild thickening of subacromial subdeltoid bursa.    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 Pt states she is just running for exercise at this time.  Prior Level of Function: strength training/classes 3-4 days per week.  Pt had not been having neck or L arm pain. Pt was completely independent  Current Level of Function: not able to strength train. 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 Pt endorses most pain with reaching for the small of her back, getting her bra on, pulling up clothes. Pt gets a lot of pain with sleeping. Unsure if reaching overhead is limited, as she is right hand dominant. Pain with steering car using left hand.    Pain:  Current 3/10, worst 8/10, best 2/10  "  Location: left neck  and shoulder   Description: Sharp and twinge, sudden, like a knife  Aggravating Factors: Night Time, Lifting and pulling, dressing, reaching across her body.   Easing Factors: pain medication and rest    Pts goals: "I'd like to make it back to the gym without pain."      Objective     Observation: pt received ambulating independently. Pt is pleasant, appropriate, A+O x 3    Posture: rounded shoulders      Passive Range of Motion: TBA    Active Range of Motion:   Shoulder Left Right   Flexion ~170 (seated)* 125 supine WNL    Abduction 90 (seated) 115* supine WNL   ER at 0 71* 88   ER at 90 48* 90   IR at 0 Across low back* ~T7   IR at 90 90 90   *pain in shoulder    Upper Extremity Strength   (L) UE (R) UE   Shoulder flexion: 3-/5* 5/5   Shoulder Abduction: 3-/5* 5/5   Shoulder ER >/=3+/5* 5/5   Shoulder ext >/=3+/5 5/5   Shoulder IR 5/5* 5/5   Lower Trap 3-/5 5/5   Middle Trap >/=3+/5* 4/5   Rhomboids >/=3+/5* 5/5   Biceps 4/5 5/5   Triceps 5/5 5/5   Wrist extensor 5/5 5/5   Wrist flexor 5/5 5/5    good good       Special Tests:   Left Right        Empty Can Test + NT     Joint Mobility: empty/guarded    Palpation: tender and increased tightness in L upper trap    Sensation: light touch intact     Flexibility: tight pecs and upper traps B    Scapular Control/Dyskinesis:    Normal / Subtle / Obvious  Comments    Left  obvious Pt abducts to ~90 degrees    Right  Normal none     FOTO - To be completed      TREATMENT   Treatment Time In: 11:45 AM  Treatment Time Out: 11:55 AM  Total Treatment time (time-based codes) separate from Evaluation: 10 minutes    Laura received therapeutic exercises to develop strength, ROM, flexibility and posture for 10 minutes including:  Upper trap stretch by PT, 30s/side  scap retractions 10 x 3s  Upper trap stretch seated, 30s/side  Levator stretch, 30s/side  Left arm table slide, 10 reps in scaption    Home Exercises and Patient Education Provided    Education " provided:   - exercise technique, HEP, sleeping with pillow support under left arm; use of towel roll in sitting for posture and supine for pec stretch. Use of Theracane and lacrosse ball to manage muscular tightness. POC, scheduling    Written Home Exercises Provided: yes.  Exercises were reviewed and Laura was able to demonstrate them prior to the end of the session.  Laura demonstrated good  understanding of the education provided.     See EMR under Patient Instructions for exercises provided 4/7/2020.    Assessment   Laura is a 39 y.o. female referred to outpatient Physical Therapy with a medical diagnosis of Traumatic incomplete tear of left rotator cuff, subsequent encounter. MRI reveals small tear in left supraspinatus Pt presents with left neck and shoulder pain, which sometimes radiates down ulnar side of arm. Deferred nerve tension testing due to pain in L shoulder. Pt's radiating symptoms likely due to increased muscle tightnes from L upper trap compensation. Pt with decreased strength, poor posture, substantial limitations in L shoulder ROM, which impairs her functional mobility for ADL's, and work and recreational tasks. Pt wishes to start therapy 1x/week due to her current schedule      Pt prognosis is Good.   Pt will benefit from skilled outpatient Physical Therapy to address the deficits stated above and in the chart below, provide pt/family education, and to maximize pt's level of independence.     Plan of care discussed with patient: Yes  Pt's spiritual, cultural and educational needs considered and patient is agreeable to the plan of care and goals as stated below:     Anticipated Barriers for therapy: none anticipated    Medical Necessity is demonstrated by the following  History  Co-morbidities and personal factors that may impact the plan of care Co-morbidities:   high BMI and prior neck pain    Personal Factors:   no deficits     moderate   Examination  Body Structures and Functions, activity  limitations and participation restrictions that may impact the plan of care Body Regions:   neck  upper extremities    Body Systems:    ROM  strength  transitions  motor control  functional mobility, posture    Participation Restrictions:   Work schedule    Activity limitations:   Learning and applying knowledge  no deficits    General Tasks and Commands  no deficits    Communication  no deficits    Mobility  lifting and carrying objects  driving (bike, car, motorcycle)    Self care  washing oneself (bathing, drying, washing hands)  dressing    Domestic Life  shopping  cooking  doing house work (cleaning house, washing dishes, laundry)    Interactions/Relationships  no deficits    Life Areas  employment    Community and Social Life  recreation and leisure         high   Clinical Presentation stable and uncomplicated low   Decision Making/ Complexity Score: low     Goals:  Short Term Goals: 4 weeks   1. Pt will tolerate HEP for improved strength, functional mobility, ROM, posture, and endurance. (progressing, not met)  2. Pt will report reduced L neck and shoulder pain to </= 6/10 at worst for improved functional mobility and ability to participate in work activities/ADL's. (progressing, not met)  3. Pt will increase L shoulder flexion AROM to >/= 150 degrees (supine) for improved functional mobility and reach. (progressing, not met)  4. Pt will demo >/= 3+/5 strength in LUE for improved functional mobility, endurance, and posture. (progressing, not met)  5. Pt will report improved ability to get dressed without increase in pain/symptoms for improved functional mobility (progressing, not met)  6.        Pt will increase L shoulder ABD AROM to >/= 130 degrees (supine) for improved functional mobility and reach. (progressing, not met)    Long Term Goals: 8 weeks   1. Pt will be I with updated HEP for improved functional mobility, posture, strength, and endurance. (progressing, not met)  2. Pt will report reduced L neck  and shoulder pain to </= 4/10 at worst for improved functional mobility and ability to participate in work activities/ADL's. (progressing, not met)  3. Pt will increase L shoulder IR AROM to >/=T10 for improved functional mobility and reach. (progressing, not met)  4. Pt will demo >/= 4/5 strength in LUE for improved functional mobility, endurance, and posture. (progressing, not met)  5. Pt will demo/report increased ability to reach overhead to put away dishes without pain/symptoms for improved functional mobility (progressing, not met)  6.         Pt will increase L shoulder flexion AROM to >/= 170 degrees (supine) for improved functional mobility and reach. (progressing, not met)  7.          Pt will increase L shoulder ABD AROM to >/= 170 degrees (supine) for improved functional mobility and reach. (progressing, not met)  8.        Pt will report improved ability to get drive/steer without increase in pain/symptoms for improved functional mobility (progressing, not met)    Plan   Plan of care Certification: 4/7/2020 to 6/5/20.    Outpatient Physical Therapy 2 times weekly for 8 weeks to include the following interventions: Manual Therapy, Moist Heat/ Ice, Neuromuscular Re-ed, Patient Education, Self Care, Therapeutic Activites, Therapeutic Exercise and modalities and dry needling as appropriate..     Cece Cherry, PT

## 2020-04-09 ENCOUNTER — TELEPHONE (OUTPATIENT)
Dept: SPORTS MEDICINE | Facility: CLINIC | Age: 40
End: 2020-04-09

## 2020-04-09 NOTE — TELEPHONE ENCOUNTER
LM with patient regarding appt reschedule date/time/location. Callback number [phone].    Payal Livingston  Orthopedic Clinical Assistant to Dr. Camilla Gifford

## 2020-04-20 ENCOUNTER — CLINICAL SUPPORT (OUTPATIENT)
Dept: REHABILITATION | Facility: HOSPITAL | Age: 40
End: 2020-04-20
Attending: FAMILY MEDICINE
Payer: COMMERCIAL

## 2020-04-20 DIAGNOSIS — R53.1 DECREASED STRENGTH: ICD-10-CM

## 2020-04-20 DIAGNOSIS — Z74.09 IMPAIRED FUNCTIONAL MOBILITY AND ACTIVITY TOLERANCE: ICD-10-CM

## 2020-04-20 DIAGNOSIS — M25.612 DECREASED RANGE OF MOTION OF LEFT SHOULDER: ICD-10-CM

## 2020-04-20 DIAGNOSIS — M25.512 ACUTE PAIN OF LEFT SHOULDER: ICD-10-CM

## 2020-04-20 PROCEDURE — 97110 THERAPEUTIC EXERCISES: CPT | Mod: PO

## 2020-04-20 NOTE — PROGRESS NOTES
Physical Therapy Treatment Note     Name: Laura Fitzpatrick  Clinic Number: [id number]    Therapy Diagnosis:   Encounter Diagnoses   Name Primary?    Acute pain of left shoulder     Decreased range of motion of left shoulder     Decreased strength     Impaired functional mobility and activity tolerance      Physician: Rayo Arreola MD    Visit Date: 4/20/2020    Physician Orders: PT Eval and Treat   Medical Diagnosis from Referral: Traumatic incomplete tear of left rotator cuff, subsequent encounter  Evaluation Date: 4/7/2020  Authorization Period Expiration: 12/31/20  Plan of Care Certification Period: 04/07/2020 - 06/05/2020  Visit #/Visits authorized: 2/ 25     Time In: 4:56 pm  Time Out: 5:56 pm  Total Billable Time: 50 minutes    Precautions: Standard    Subjective     Pt reports: she is still having shoulder pain and had to cancel her last appointment due to her work schedule changing.   She was somewhat compliant with home exercise program.  Response to previous treatment: good, she felt better after last visit which was IE  Functional change: n/a    Pain: 3/10  Location: left neck  and shoulder      Objective     Laura received therapeutic exercises to develop strength, endurance, ROM, flexibility and posture for 50 minutes including:    Scap retractions 30x 3 second hold  Upper trap stretch 3x 30 seconds each  Levator stretch 3x  30 seconds each  Isometric flexion/abduction/ER/IR/Extension 10x 5 second hold  Rows with OTB 2x 10  Standing I's with OTB 2x 10  Left arm table slide, 2x 10 reps in scaption with 3 second hold  AAROM with dowel supine flexion 30x 3 second hold  Serratus punches in supine at 90' flexion 2x 10  Sidelying ER 2x 10  Sidelying ABD to tolerance 2x 10  Supine pec stretch over 1/2 foam 2x 1 minute  Bruggers with YTB 2x 10  Seated AAROM flexion with dowel 30x 3 second hold    Laura received cold pack for 10 minutes to L shoulder.      Home Exercises Provided and Patient Education  "Provided     Education provided:   - scapular control  - exercise instruction/purpose  - progressions in future sessions  - HEP performance  - slow/controlled motions    Written Home Exercises Provided: Patient instructed to cont prior HEP.  Exercises were reviewed and Laura was able to demonstrate them prior to the end of the session.  Laura demonstrated good  understanding of the education provided.     See EMR under Patient Instructions for exercises provided prior visit.    Assessment     Patient tolerated exercises well.  She denied increase in pain with exercises.  Patient received verbal cueing to decrease shoulder elevation with scapular retraction and shoulder flexion exercises.  Occasional "catching" sensation was reported with patient facial expressions indicating pain/discomfort in L shoulder between 120-140 degrees shoulder flexion with supine/seated AAROM.  Patient is able to achieve increased flexion after reporting catch sensation.  Patient self reports difficulty on L with bruggers requiring cues to keep elbows close to her side.  Patient was educated on performing slow and controlled motions, improving scapular control with exercises and progressions with HEP at next visit based on tolerance to initial treatment session today.  Patient will continue to benefit from skilled physical therapy to address deficits in scapular control and improve L UE ROM and strength to return to body pump class.     Laura is progressing well towards her goals.   Pt prognosis is Good.     Pt will continue to benefit from skilled outpatient physical therapy to address the deficits listed in the problem list box on initial evaluation, provide pt/family education and to maximize pt's level of independence in the home and community environment.     Pt's spiritual, cultural and educational needs considered and pt agreeable to plan of care and goals.     Anticipated barriers to physical therapy: none    Goals:     Short Term " Goals: 4 weeks   1. Pt will tolerate HEP for improved strength, functional mobility, ROM, posture, and endurance. (progressing, not met)  2. Pt will report reduced L neck and shoulder pain to </= 6/10 at worst for improved functional mobility and ability to participate in work activities/ADL's. (progressing, not met)  3. Pt will increase L shoulder flexion AROM to >/= 150 degrees (supine) for improved functional mobility and reach. (progressing, not met)  4. Pt will demo >/= 3+/5 strength in LUE for improved functional mobility, endurance, and posture. (progressing, not met)  5. Pt will report improved ability to get dressed without increase in pain/symptoms for improved functional mobility (progressing, not met)  6.        Pt will increase L shoulder ABD AROM to >/= 130 degrees (supine) for improved functional mobility and reach. (progressing, not met)     Long Term Goals: 8 weeks   1. Pt will be I with updated HEP for improved functional mobility, posture, strength, and endurance. (progressing, not met)  2. Pt will report reduced L neck and shoulder pain to </= 4/10 at worst for improved functional mobility and ability to participate in work activities/ADL's. (progressing, not met)  3. Pt will increase L shoulder IR AROM to >/=T10 for improved functional mobility and reach. (progressing, not met)  4. Pt will demo >/= 4/5 strength in LUE for improved functional mobility, endurance, and posture. (progressing, not met)  5. Pt will demo/report increased ability to reach overhead to put away dishes without pain/symptoms for improved functional mobility (progressing, not met)  6.         Pt will increase L shoulder flexion AROM to >/= 170 degrees (supine) for improved functional mobility and reach. (progressing, not met)  7.          Pt will increase L shoulder ABD AROM to >/= 170 degrees (supine) for improved functional mobility and reach. (progressing, not met)  8.        Pt will report improved ability to get  drive/steer without increase in pain/symptoms for improved functional mobility (progressing, not met)    Plan     Continue with established PT POC and progress per pt tolerance.  Add new exercises to HEP at next visit based on pt tolerance to last session.     Dottie Theodore, PT

## 2020-04-27 ENCOUNTER — LAB VISIT (OUTPATIENT)
Dept: INTERNAL MEDICINE | Facility: CLINIC | Age: 40
End: 2020-04-27
Payer: COMMERCIAL

## 2020-04-27 ENCOUNTER — OFFICE VISIT (OUTPATIENT)
Dept: INTERNAL MEDICINE | Facility: CLINIC | Age: 40
End: 2020-04-27
Payer: COMMERCIAL

## 2020-04-27 DIAGNOSIS — J06.9 VIRAL URI WITH COUGH: Primary | ICD-10-CM

## 2020-04-27 DIAGNOSIS — J06.9 VIRAL URI WITH COUGH: ICD-10-CM

## 2020-04-27 DIAGNOSIS — Z20.822 SUSPECTED COVID-19 VIRUS INFECTION: ICD-10-CM

## 2020-04-27 LAB — SARS-COV-2 RNA RESP QL NAA+PROBE: NOT DETECTED

## 2020-04-27 PROCEDURE — 99213 OFFICE O/P EST LOW 20 MIN: CPT | Mod: 95,,, | Performed by: FAMILY MEDICINE

## 2020-04-27 PROCEDURE — U0002 COVID-19 LAB TEST NON-CDC: HCPCS

## 2020-04-27 PROCEDURE — 99213 PR OFFICE/OUTPT VISIT, EST, LEVL III, 20-29 MIN: ICD-10-PCS | Mod: 95,,, | Performed by: FAMILY MEDICINE

## 2020-04-27 NOTE — PATIENT INSTRUCTIONS
Instructions for Patients with Confirmed or Suspected COVID-19    If you are awaiting your test result, you will either be called or it will be released to the patient portal.  If you have any questions about your test, please visit [email or web address] or call our COVID-19 information line at [phone].       Stay home and stay away from family members and friends. The CDC says, you can leave home after these three things have happened: 1) You have had no fever for at least 72 hours (that is three full days of no fever without the use of medicine that reduces fevers) 2) AND other symptoms have improved (for example, when your cough or shortness of breath have improved) 3) AND at least 7 days have passed since your symptoms first appeared.   Separate yourself from other people and animals in your home.   Call ahead before visiting your doctor.   Wear a facemask.   Cover your coughs and sneezes.   Wash your hands often with soap and water; hand  can be used, too.   Avoid sharing personal household items.   Wipe down surfaces used daily.   Monitor your symptoms. Seek prompt medical attention if your illness is worsening (e.g., difficulty breathing).    Before seeking care, call your healthcare provider.   If you have a medical emergency and need to call 911, notify the dispatch personnel that you have, or are being evaluated for COVID-19. If possible, put on a facemask before emergency medical services arrive.        Recommended precautions for household members, intimate partners, and caregivers in a home setting of a patient with symptomatic laboratory-confirmed COVID-19 or a patient under investigation.  Household members, intimate partners, and caregivers in the home setting awaiting tests results have close contact with a person with symptomatic, laboratory-confirmed COVID-19 or a person under investigation. Close contacts should monitor their health; they should call their  provider right away if they develop symptoms suggestive of COVID-19 (e.g., fever, cough, shortness of breath).    Close contacts should also follow these recommendations:   Make sure that you understand and can help the patient follow their provider's instructions for medication(s) and care. You should help the patient with basic needs in the home and provide support for getting groceries, prescriptions, and other personal needs.   Monitor the patient's symptoms. If the patient is getting sicker, call his or her healthcare provider and tell them that the patient has laboratory-confirmed COVID-19. If the patient has a medical emergency and you need to call 911, notify the dispatch personnel that the patient has, or is being evaluated for COVID-19.   Household members should stay in another room or be  from the patient. Household members should use a separate bedroom and bathroom, if available.   Prohibit visitors.   Household members should care for any pets in the home.   Make sure that shared spaces in the home have good air flow, such as by an air conditioner or an opened window, weather permitting.   Perform hand hygiene frequently. Wash your hands often with soap and water for at least 20 seconds or use an alcohol-based hand  (that contains > 60% alcohol) covering all surfaces of your hands and rubbing them together until they feel dry. Soap and water should be used preferentially.   Avoid touching your eyes, nose, and mouth.   The patient should wear a facemask. If the patient is not able to wear a facemask (for example, because it causes trouble breathing), caregivers should wear a mask when they are in the same room as the patient.   Wear a disposable facemask and gloves when you touch or have contact with the patient's blood, stool, or body fluids, such as saliva, sputum, nasal mucus, vomit, urine.  o Throw out disposable facemasks and gloves after using them. Do not  reuse.  o When removing personal protective equipment, first remove and dispose of gloves. Then, immediately clean your hands with soap and water or alcohol-based hand . Next, remove and dispose of facemask, and immediately clean your hands again with soap and water or alcohol-based hand .   You should not share dishes, drinking glasses, cups, eating utensils, towels, bedding, or other items with the patient. After the patient uses these items, you should wash them thoroughly (see below Wash laundry thoroughly).   Clean all high-touch surfaces, such as counters, tabletops, doorknobs, bathroom fixtures, toilets, phones, keyboards, tablets, and bedside tables, every day. Also, clean any surfaces that may have blood, stool, or body fluids on them.   Use a household cleaning spray or wipe, according to the label instructions. Labels contain instructions for safe and effective use of the cleaning product including precautions you should take when applying the product, such as wearing gloves and making sure you have good ventilation during use of the product.   Wash laundry thoroughly.  o Immediately remove and wash clothes or bedding that have blood, stool, or body fluids on them.  o Wear disposable gloves while handling soiled items and keep soiled items away from your body. Clean your hands (with soap and water or an alcohol-based hand ) immediately after removing your gloves.  o Read and follow directions on labels of laundry or clothing items and detergent. In general, using a normal laundry detergent according to washing machine instructions and dry thoroughly using the warmest temperatures recommended on the clothing label.   Place all used disposable gloves, facemasks, and other contaminated items in a lined container before disposing of them with other household waste. Clean your hands (with soap and water or an alcohol-based hand ) immediately after handling these  items. Soap and water should be used preferentially if hands are visibly dirty.   Discuss any additional questions with your state or local health department or healthcare provider. Check available hours when contacting your local health department.    For more information see CDC link below.      https://www.cdc.gov/coronavirus/2019-ncov/hcp/guidance-prevent-spread.html#precautions        Sources:  [facility], [facility] Health and [facility]

## 2020-04-27 NOTE — PROGRESS NOTES
[facility]  [facility] Family Medicine/ Internal Medicine  Clinic Note      Subjective:       Patient ID: Laura Fitzpatrick is a 39 y.o. female.    Chief Complaint: No chief complaint on file.    Patient calls in today for sick tele visit.  She notes onset of fever and sick symptoms since yesterday.  She has been babysitting recently, but otherwise notes no new sick exposures.  She describes onset of upper respiratory symptoms, mild cough, and headache along with fever up 100.5'F.    URI    This is a new problem. The current episode started yesterday. The problem has been gradually worsening. The maximum temperature recorded prior to her arrival was 100.4 - 100.9 F. The fever has been present for 1 to 2 days. Associated symptoms include congestion, coughing, headaches (frontal, diffuse) and a sore throat. Pertinent negatives include no chest pain, diarrhea, nausea, rhinorrhea, sinus pain, vomiting or wheezing. She has tried NSAIDs for the symptoms. The treatment provided moderate relief.     Review of Systems   Constitutional: Positive for fatigue and fever.   HENT: Positive for congestion and sore throat. Negative for rhinorrhea and sinus pain.    Respiratory: Positive for cough. Negative for chest tightness, shortness of breath and wheezing.    Cardiovascular: Negative for chest pain.   Gastrointestinal: Negative for diarrhea, nausea and vomiting.   Neurological: Positive for headaches (frontal, diffuse). Negative for dizziness.       Objective:      There were no vitals taken for this visit.  Physical Exam   Constitutional: She is oriented to person, place, and time. She appears well-developed and well-nourished.  Non-toxic appearance. She does not have a sickly appearance. She does not appear ill. No distress.   Limited exam and vitals today due to virtual video visit   Eyes: Conjunctivae are normal.   Pulmonary/Chest: Effort normal. No respiratory distress.   Neurological: She is alert and  oriented to person, place, and time.   Psychiatric: She has a normal mood and affect. Her speech is normal and behavior is normal. Thought content normal.       Assessment:       1. Viral URI with cough    2. Suspected Covid-19 Virus Infection        Plan:     1. Viral URI with cough  2. Suspected Covid-19 Virus Infection  - history and exam findings reviewed, reassurance provided  - differential reviewed, presentation is very consistent with acute URI, likely viral, will test to rule out COVID syndrome  - - COVID-19 Routine Screening; Future   - supportive care measures reviewed, have recommended increased oral fluids and judicious use of OTC cough remedies  - treatment options reviewed, have counseled that antibiotics are not indicated at this time, and the patient expressed understanding  - questions answered, warning signs reviewed, patient is comfortable with plan to monitor condition and was encouraged to call the office for any concerns or worsening of condition      The patient location is: at home in [place]  The chief complaint leading to consultation is: sick visit  Visit type: audiovisual  Total time spent with patient: 8 minutes  Each patient to whom he or she provides medical services by telemedicine is:  (1) informed of the relationship between the physician and patient and the respective role of any other health care provider with respect to management of the patient; and (2) notified that he or she may decline to receive medical services by telemedicine and may withdraw from such care at any time.    - Follow up in the next week if needed, for follow-up illness.     Rayo Arreola MD  4/27/2020          Medication List with Changes/Refills   Current Medications    ETONOGESTREL-ETHINYL ESTRADIOL (NUVARING) 0.12-0.015 MG/24 HR VAGINAL RING    Place 1 each vaginally every 28 days.    HYDROXYZINE HCL (ATARAX) 10 MG TAB    Take 1 tablet (10 mg total) by mouth 3 (three) times daily as needed (anxiety).     SERTRALINE (ZOLOFT) 100 MG TABLET    Take 1 tablet (100 mg total) by mouth once daily.

## 2020-05-07 NOTE — PROGRESS NOTES
Subjective:     Laura Fitzpatrick     Chief Complaint   Patient presents with    Left Shoulder - Pain       HPI    Laura is a 39 y.o. female coming in today for left shoulder pain that began about 2 month(s) ago, referred by Dr. Arreola. Pt. describes the pain as a 4/10 achy pain that does not radiate. There was a fall/injury/ or trauma associated with the onset of symptoms. Pt does [employer] regularly at the gym and feels she injured it lifting weights. No acute mechanism. The pain is better with nothing (went to a couple of PT sessions without relief, complicated by lack of access due to COVID) and worse with reaching behind her back, activity. Pt. Denies any other musculoskeletal complaints at this time.     Joint instability? no  Mechanical locking/clicking? no  Affecting ADL's? yes  Affecting sleep? yes    Occupation: speech therapist, [employer], babysitting currently    Review of Systems   Constitutional: Negative for chills and fever.   HENT: Negative for hearing loss and tinnitus.    Eyes: Negative for blurred vision and photophobia.   Respiratory: Negative for cough and shortness of breath.    Cardiovascular: Negative for chest pain and leg swelling.   Gastrointestinal: Negative for abdominal pain, heartburn, nausea and vomiting.   Genitourinary: Negative for dysuria and hematuria.   Musculoskeletal: Positive for joint pain and myalgias. Negative for back pain, falls and neck pain.   Skin: Negative for rash.   Neurological: Negative for dizziness, tingling, focal weakness, weakness and headaches.   Endo/Heme/Allergies: Negative for environmental allergies. Does not bruise/bleed easily.   Psychiatric/Behavioral: Negative for depression. The patient is not nervous/anxious.        PAST MEDICAL HISTORY:   Past Medical History:   Diagnosis Date    Abnormal Pap smear 12 years ago    Cryotherapy    Abnormal Pap smear of cervix     Cervical radiculopathy 4/29/2015    Cervical radiculopathy 4/29/2015     Enlarged thyroid gland 1/26/2015    GERD (gastroesophageal reflux disease)     Obesity (BMI 30-39.9)     Right-sided carotid artery disease 2/8/2016     PAST SURGICAL HISTORY:   Past Surgical History:   Procedure Laterality Date    APPENDECTOMY  2011    ESOPHAGOGASTRODUODENOSCOPY N/A 9/20/2018    Procedure: EGD (ESOPHAGOGASTRODUODENOSCOPY);  Surgeon: Karl Judge MD;  Location: [address]);  Service: Endoscopy;  Laterality: N/A;    INGUINAL HERNIA REPAIR Right 2017    MYRINGOTOMY W/ TUBES       FAMILY HISTORY:   Family History   Problem Relation Age of Onset    Heart disease Mother 40    Diabetes Mother     Depression Mother     Anxiety disorder Mother     Obesity Mother     Hypertension Father     Diabetes Father     Obesity Father     Kidney disease Father     Heart disease Maternal Grandmother     Diabetes Maternal Grandmother     Diabetes Maternal Grandfather     Cancer Paternal Grandmother 58        Breast    Diabetes Paternal Grandmother     Breast cancer Paternal Grandmother     Gestational diabetes Sister     Asthma Sister         as a child    Stroke Neg Hx     Colon cancer Neg Hx     Ovarian cancer Neg Hx      SOCIAL HISTORY:   Social History     Socioeconomic History    Marital status: Single     Spouse name: Not on file    Number of children: Not on file    Years of education: Not on file    Highest education level: Not on file   Occupational History    Occupation: Speech pathologist    Social Needs    Financial resource strain: Not on file    Food insecurity:     Worry: Not on file     Inability: Not on file    Transportation needs:     Medical: Not on file     Non-medical: Not on file   Tobacco Use    Smoking status: Never Smoker    Smokeless tobacco: Never Used   Substance and Sexual Activity    Alcohol use: Yes     Alcohol/week: 0.0 standard drinks     Comment: social    Drug use: No    Sexual activity: Yes     Partners: Male   Lifestyle     Physical activity:     Days per week: Not on file     Minutes per session: Not on file    Stress: Not on file   Relationships    Social connections:     Talks on phone: Not on file     Gets together: Not on file     Attends Mandaen service: Not on file     Active member of club or organization: Not on file     Attends meetings of clubs or organizations: Not on file     Relationship status: Not on file   Other Topics Concern    Not on file   Social History Narrative    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,  Sporadic exercise       MEDICATIONS:   Current Outpatient Medications:     etonogestrel-ethinyl estradiol (NUVARING) 0.12-0.015 mg/24 hr vaginal ring, Place 1 each vaginally every 28 days., Disp: 1 each, Rfl: 12    hydrOXYzine HCl (ATARAX) 10 MG Tab, Take 1 tablet (10 mg total) by mouth 3 (three) times daily as needed (anxiety)., Disp: 30 tablet, Rfl: 1    sertraline (ZOLOFT) 100 MG tablet, Take 1 tablet (100 mg total) by mouth once daily., Disp: 90 tablet, Rfl: 1  ALLERGIES:   Review of patient's allergies indicates:   Allergen Reactions    Wellbutrin [bupropion hcl] Anxiety     Reviewed office visit on 3/11/20 with Dr. Arreola: L shoulder pain, radicular pain of L UE, XR, MRI    IMAGIN. X-ray ordered due to neck pain. (AP, lateral, bilateral obliques, and odontoid  views) taken 3/11/20.   2. X-ray images were reviewed personally by me and then directly with patient.  3. FINDINGS: X-ray images obtained demonstrate no cortical irregularities, sclerosis, osteophyte formation, or subchonral cysts. There is no joint space narrowing.  4. IMPRESSION: No pathology or irregularities appreciated.     IMAGIN. X-ray ordered due to left shoulder pain. (AP, scapular Y, and axillary views) taken 3/11/20.   2. X-ray images were reviewed personally by me and then directly with patient.  3. FINDINGS: X-ray images obtained demonstrate no cortical irregularities, sclerosis, osteophyte formation, or subchonral cysts. There is  "no joint space narrowing.  4. IMPRESSION: No pathology or irregularities appreciated.     IMAGIN. MRI ordered due to left shoulder pain taken 3/11/20.   2. MRI  images were reviewed personally by me and then directly with patient.  3. FINDINGS:   Rotator cuff: Minimal low-grade bursal surface fraying of the supraspinatus tendon.  Infraspinatus, teres minor and subscapularis tendons are intact.  Muscle bulk is maintained.  Labrum: Glenoid labrum is intact.  Biceps: Long head biceps tendon is intact.  Bone: There is no fracture. Bone marrow signal is unremarkable. No evidence of a marrow replacement process.  Acromioclavicular joint: The AC joint is unremarkable.  Cartilage: Articular cartilage of the glenohumeral joint is preserved.  Miscellaneous: Mild thickening of subacromial subdeltoid bursa.  4. IMPRESSION: Mild low-grade bursal surface fraying of the supraspinatus tendon.  Mild thickening of subacromial subdeltoid bursa.    Objective:     VITAL SIGNS: /80   Pulse 82   Ht 5' 1" (1.549 m)   Wt 81.6 kg (180 lb)   BMI 34.01 kg/m²    General    Nursing note and vitals reviewed.  Constitutional: She is oriented to person, place, and time. She appears well-developed and well-nourished.   HENT:   Head: Normocephalic and atraumatic.   no nasal discharge, no external ear redness or discharge   Eyes:   EOM is full and smooth  No eye redness or discharge   Neck: Neck supple. No tracheal deviation present.   Cardiovascular: Normal rate.    2+ Radial pulse bilaterally  2+ Dorsalis Pedis pulse bilaterally  No LE edema appreciated   Pulmonary/Chest: Effort normal. No respiratory distress.   Abdominal: She exhibits no distension.   No rigidity   Neurological: She is alert and oriented to person, place, and time. She exhibits normal muscle tone. Coordination normal.   See details below   Psychiatric: She has a normal mood and affect. Her behavior is normal.               MUSCULOSKELETAL EXAM  Shoulder: left " SHOULDER  The affected shoulder is compared to the contralateral shoulder.    Observation:      SHOULDER  No ecchymosis, edema, or erythema throughout the shoulder girdle.  No sternal, clavicular, or acromial deformities bilaterally.  No atrophy of the pectorals, deltoids, supraspinatus, infraspinatus, or biceps bilaterally.  No asymmetry of shoulders bilaterally.    Posture:  Increased thoracic kyphosis  Gait: Non-antalgic     ROM (* = with pain):  CERVICAL SPINE  Full AROM in flexion, extension, sidebending, and rotation.    SHOULDER  Active flexion to 180° on left and 180° on right.  Active abduction to 180° on left* (improved following OMT) and 180° on right.  Active internal rotation to T8 on left*  (improved following OMT to T7 with decreased pain) and T7 on right.    Active external rotation to T4 on left and T4 on right.    No scapular dyskinesia or winging.    Tenderness:  No tenderness at the SC or AC joint  No tenderness over the clavicle   No tenderness over biceps tendon or bicipital groove  No tenderness over subacromial space    Strength Testing (* = with pain):  Deltoid - 5/5 on left and 5/5 on right  Biceps - 5/5 on left and 5/5 on right  Triceps - 5/5 on left and 5/5 on right  Wrist extension - 5/5 on left and 5/5 on right  Wrist flexion - 5/5 on left and 5/5 on right   - 5/5 on left and 5/5 on right  Finger extension - 5/5 on left and 5/5 on right  Finger abduction - 5/5 on left and 5/5 on right    Special Tests:  Empty can test - negative for weakness, positive for mild pain  Full can test - negative  Bear hug test - negative  Belly press test - negative  Resisted internal rotation - negative  Resisted external rotation - negative    Neer's test - positive  Hawkin's-Antony test - positive  Cross-arm test- negative    OSeamuss test - negative for deep pain    Sulcus sign - none  AP load and shift laxity - none    Speed's test - negative  Yergason's test - negative    Neurovascular  Exam:  Spurlings test - negative bialterally  2+ radial pulses bilaterally  Sensation intact to light touch in the distal median, radial, and ulnar nerve distributions bilaterally.  Negative Tinel's at carpal tunnel  Negative Tinel's at cubital tunnel  2+/4 reflexes at triceps, biceps, and brachioradialis  Capillary refill intact <2 seconds in all digits bilaterally      TART (Tissue texture abnormality, Asymmetry,  Restriction of motion and/or Tenderness) changes:    Head: Occipitoatlantal (OA) Joint Neutral     Cervical Spine   C1 Neutral   C2 Neutral   C3 Neutral   C4 Neutral   C5 Neutral   C6 Neutral   C7 Neutral      Thoracic Spine   T1 TTA LEFT   T2 Neutral   T3 Neutral   T4 Neutral   T5 FRS LEFT   T6 FRS LEFT   T7 FRS LEFT   T8 Neutral   T9 Neutral   T10 Neutral   T11 Neutral   T12 Neutral     Rib cage: R1 inhlaed on left    Upper extremity: Left thoracic outlet TTA, left posterior shoulder Herniated trigger point (HTP) fascial distortion       Key   F= Flexed   E = Extended   R = Rotated   S = Sidebent   TTA = tissue texture abnormality         Assessment:      Encounter Diagnoses   Name Primary?    Subacromial impingement of left shoulder     Subacromial bursitis of left shoulder joint Yes    Myalgia     Somatic dysfunction of thoracic region     Somatic dysfunction of rib cage region     Upper extremity somatic dysfunction           Plan:     1. Left shoulder pain secondary to impingement from poor thoracic and scapular biomechanics likely secondary to overuse with exercise.  Associated subacromial bursitis noted on MRI from 03/12/2020, however pain has already improved with rest and NSAIDs at home.  Mild supraspinatus fraying also noted on MRI however no significant rotator cuff weakness appreciated on physical exam today.   - OMT performed today to address associated biomechanical and fascial restrictions  and HEP started.   - discussed activity modifications with workouts to decrease left shoulder  load  - Recommend OTC NSAIDs and Ice up to 20 minutes at a time prn for pain control. Can replaced over-the-counter NSAIDs with prescribed Celebrex 100 mg b.i.d. as needed for pain control  - Consider US guided left subacromial bursa injection if pain persists  -  X-ray images of left shoulder taken 3/11/20 (AP, scapular Y, and axillary views) showed no abnormalities. Images were personally reviewed with patient.  -  MRI  images of left shoulder taken 3/11/20 showed Mild low-grade bursal surface fraying of the supraspinatus tendon.  Mild thickening of subacromial subdeltoid bursa. Images were personally reviewed with patient.    2. OMT 3-4 regions. Oral consent obtained.  Reviewed benefits and potential side effects.   - OMT indicated today due to signs and symptoms as well as local and remote somatic dysfunction findings and their related neurokinetic, lymphatic, fascial and/or arteriovenous body connections.   - OMT techniques used: Myofascial Release, Muscle Energy, Still's Technique and Fascial Distortion Model   - Treatment was tolerated well. Improvement noted in segmental mobility post-treatment in dysfunctional regions. There were no adverse events and no complications immediately following treatment.     3. Pt. Given the following HEP:  A)  Arm Blackfeet exercises: from the side-laying position, rotate top arm in a clockwise direction x 10 reps, following by rotating in a counter-clockwise position x 10 reps, repeat on other side, twice daily. Hand-out also given.   B)  Supine Thoracic extension exercise: 10-15 reps, twice daily. Handout also given.   C) Lower abdominal muscle isotonic exercises: Rotate pelvis into the 12 o'clock position and holding it to engage lower abdominal muscles. Then lift up leg, one at a time, alternating for 10-15 reps. Repeat exercises 1-2 times daily. Handout given.   D) Corner stretch: hold for 30 seconds, repeat 2-3 times, twice daily    [id number] HOME EXERCISE PROGRAM (HEP):  The  patient was taught a homegoing physical therapy regimen as described above. The patient demonstrated understanding of the exercises and proper technique of their execution. This interaction took 15 minutes.     4. Follow-up in 3 weeks for reevaluation    5. Patient agreeable to today's plan and all questions were answered    This note is dictated using the M*Modal Fluency Direct word recognition program. There are word recognition mistakes that are occasionally missed on review.    As per the guidelines from [facility], this patient's condition is considered time sensitive.  The visit could not be done via telemedicine. Treatment performed during this visit was medically necessary is to avoid further harm to the patient's underlying condition.

## 2020-05-08 ENCOUNTER — OFFICE VISIT (OUTPATIENT)
Dept: SPORTS MEDICINE | Facility: CLINIC | Age: 40
End: 2020-05-08
Payer: COMMERCIAL

## 2020-05-08 VITALS
DIASTOLIC BLOOD PRESSURE: 80 MMHG | SYSTOLIC BLOOD PRESSURE: 119 MMHG | HEIGHT: 61 IN | HEART RATE: 82 BPM | BODY MASS INDEX: 33.99 KG/M2 | WEIGHT: 180 LBS

## 2020-05-08 DIAGNOSIS — M79.10 MYALGIA: ICD-10-CM

## 2020-05-08 DIAGNOSIS — M75.52 SUBACROMIAL BURSITIS OF LEFT SHOULDER JOINT: Primary | ICD-10-CM

## 2020-05-08 DIAGNOSIS — M99.08 SOMATIC DYSFUNCTION OF RIB CAGE REGION: ICD-10-CM

## 2020-05-08 DIAGNOSIS — M99.02 SOMATIC DYSFUNCTION OF THORACIC REGION: ICD-10-CM

## 2020-05-08 DIAGNOSIS — M99.07 UPPER EXTREMITY SOMATIC DYSFUNCTION: ICD-10-CM

## 2020-05-08 DIAGNOSIS — M75.42 SUBACROMIAL IMPINGEMENT OF LEFT SHOULDER: ICD-10-CM

## 2020-05-08 PROCEDURE — 99999 PR PBB SHADOW E&M-EST. PATIENT-LVL III: CPT | Mod: PBBFAC,,, | Performed by: NEUROMUSCULOSKELETAL MEDICINE & OMM

## 2020-05-08 PROCEDURE — 99999 PR PBB SHADOW E&M-EST. PATIENT-LVL III: ICD-10-PCS | Mod: PBBFAC,,, | Performed by: NEUROMUSCULOSKELETAL MEDICINE & OMM

## 2020-05-08 PROCEDURE — 99204 OFFICE O/P NEW MOD 45 MIN: CPT | Mod: 25,S$GLB,, | Performed by: NEUROMUSCULOSKELETAL MEDICINE & OMM

## 2020-05-08 PROCEDURE — 97110 PR THERAPEUTIC EXERCISES: ICD-10-PCS | Mod: S$GLB,,, | Performed by: NEUROMUSCULOSKELETAL MEDICINE & OMM

## 2020-05-08 PROCEDURE — 97110 THERAPEUTIC EXERCISES: CPT | Mod: S$GLB,,, | Performed by: NEUROMUSCULOSKELETAL MEDICINE & OMM

## 2020-05-08 PROCEDURE — 98926 OSTEOPATH MANJ 3-4 REGIONS: CPT | Mod: S$GLB,,, | Performed by: NEUROMUSCULOSKELETAL MEDICINE & OMM

## 2020-05-08 PROCEDURE — 3008F PR BODY MASS INDEX (BMI) DOCUMENTED: ICD-10-PCS | Mod: CPTII,S$GLB,, | Performed by: NEUROMUSCULOSKELETAL MEDICINE & OMM

## 2020-05-08 PROCEDURE — 3008F BODY MASS INDEX DOCD: CPT | Mod: CPTII,S$GLB,, | Performed by: NEUROMUSCULOSKELETAL MEDICINE & OMM

## 2020-05-08 PROCEDURE — 99204 PR OFFICE/OUTPT VISIT, NEW, LEVL IV, 45-59 MIN: ICD-10-PCS | Mod: 25,S$GLB,, | Performed by: NEUROMUSCULOSKELETAL MEDICINE & OMM

## 2020-05-08 PROCEDURE — 98926 PR OSTEOPATHIC MANIP,3-4 BODY REGN: ICD-10-PCS | Mod: S$GLB,,, | Performed by: NEUROMUSCULOSKELETAL MEDICINE & OMM

## 2020-05-08 NOTE — LETTER
May 8, 2020      Rayo Arreola MD  [address] [place] [address]  [place] [address]           [facility]  [address]  [place] [address]  Phone: [phone]          Patient: Laura Fitzpatrick   MR Number: [id number]   YOB: 1980   Date of Visit: 5/8/2020       Dear Dr. Rayo Arreola:    Thank you for referring Laura Fitzpatrick to me for evaluation. Attached you will find relevant portions of my assessment and plan of care.    If you have questions, please do not hesitate to call me. I look forward to following Laura Fitzpatrick along with you.    Sincerely,    Camilla Gifford, DO    Enclosure  CC:  No Recipients    If you would like to receive this communication electronically, please contact [email or web address] or [phone] to request more information on [employer] ([employer]) Link access.    For providers and/or their staff who would like to refer a patient to [facility], please contact us through our one-stop-shop provider referral line, [facility], at [phone].    If you feel you have received this communication in error or would no longer like to receive these types of communications, please e-mail [email or web address]

## 2020-05-26 ENCOUNTER — PATIENT MESSAGE (OUTPATIENT)
Dept: OBSTETRICS AND GYNECOLOGY | Facility: CLINIC | Age: 40
End: 2020-05-26

## 2020-05-26 DIAGNOSIS — Z30.015 ENCOUNTER FOR INITIAL PRESCRIPTION OF VAGINAL RING HORMONAL CONTRACEPTIVE: ICD-10-CM

## 2020-05-27 RX ORDER — ETONOGESTREL AND ETHINYL ESTRADIOL VAGINAL RING .015; .12 MG/D; MG/D
1 RING VAGINAL
Qty: 1 EACH | Refills: 1 | Status: SHIPPED | OUTPATIENT
Start: 2020-05-27 | End: 2020-07-10 | Stop reason: SDUPTHER

## 2020-06-01 NOTE — PROGRESS NOTES
Subjective:     Laura Fitzpatrick     Chief Complaint   Patient presents with    Left Shoulder - Pain       HPI      Laura is a 40 y.o. female coming in today for left shoulder pain. Since last visit the pain has Improved. The pain is better with HEP, rest, ice and worse with activity. Pt states she has been doing much better and has been able to work out at the gym with only mild soreness, relieved by ice.  Pt. describes the pain as a 0/10 currently. There has not been any new a fall/injury/ or traumas since last visit.  Pt. denies any new musculoskeletal complaints at this time.     Office note from 5/8/20 reviewed    Joint instability? no  Mechanical locking/clicking? no  Affecting ADL's? no  Affecting sleep? no    Occupation: speech therapist, [employer] currently    Review of Systems   Constitutional: Negative for chills and fever.   Musculoskeletal: Negative for back pain, falls, joint pain, myalgias and neck pain.   Neurological: Negative for dizziness, tingling, focal weakness, weakness and headaches.       PAST MEDICAL HISTORY:   Past Medical History:   Diagnosis Date    Abnormal Pap smear 12 years ago    Cryotherapy    Abnormal Pap smear of cervix     Cervical radiculopathy 4/29/2015    Cervical radiculopathy 4/29/2015    Enlarged thyroid gland 1/26/2015    GERD (gastroesophageal reflux disease)     Obesity (BMI 30-39.9)     Right-sided carotid artery disease 2/8/2016     PAST SURGICAL HISTORY:   Past Surgical History:   Procedure Laterality Date    APPENDECTOMY  2011    ESOPHAGOGASTRODUODENOSCOPY N/A 9/20/2018    Procedure: EGD (ESOPHAGOGASTRODUODENOSCOPY);  Surgeon: Karl Judge MD;  Location: [address];  Service: Endoscopy;  Laterality: N/A;    INGUINAL HERNIA REPAIR Right 2017    MYRINGOTOMY W/ TUBES       FAMILY HISTORY:   Family History   Problem Relation Age of Onset    Heart disease Mother 40    Diabetes Mother     Depression Mother     Anxiety  disorder Mother     Obesity Mother     Hypertension Father     Diabetes Father     Obesity Father     Kidney disease Father     Heart disease Maternal Grandmother     Diabetes Maternal Grandmother     Diabetes Maternal Grandfather     Cancer Paternal Grandmother 58        Breast    Diabetes Paternal Grandmother     Breast cancer Paternal Grandmother     Gestational diabetes Sister     Asthma Sister         as a child    Stroke Neg Hx     Colon cancer Neg Hx     Ovarian cancer Neg Hx      SOCIAL HISTORY:   Social History     Socioeconomic History    Marital status: Single     Spouse name: Not on file    Number of children: Not on file    Years of education: Not on file    Highest education level: Not on file   Occupational History    Occupation: Speech pathologist    Social Needs    Financial resource strain: Not on file    Food insecurity:     Worry: Not on file     Inability: Not on file    Transportation needs:     Medical: Not on file     Non-medical: Not on file   Tobacco Use    Smoking status: Never Smoker    Smokeless tobacco: Never Used   Substance and Sexual Activity    Alcohol use: Yes     Alcohol/week: 0.0 standard drinks     Comment: social    Drug use: No    Sexual activity: Yes     Partners: Male   Lifestyle    Physical activity:     Days per week: Not on file     Minutes per session: Not on file    Stress: Not on file   Relationships    Social connections:     Talks on phone: Not on file     Gets together: Not on file     Attends Sabianism service: Not on file     Active member of club or organization: Not on file     Attends meetings of clubs or organizations: Not on file     Relationship status: Not on file   Other Topics Concern    Not on file   Social History Narrative    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,  Sporadic exercise       MEDICATIONS:   Current Outpatient Medications:     etonogestreL-ethinyl estradioL (NUVARING) 0.12-0.015 mg/24 hr vaginal ring, Place 1 each  vaginally every 28 days., Disp: 1 each, Rfl: 1    hydrOXYzine HCl (ATARAX) 10 MG Tab, Take 1 tablet (10 mg total) by mouth 3 (three) times daily as needed (anxiety)., Disp: 30 tablet, Rfl: 1    sertraline (ZOLOFT) 100 MG tablet, Take 1 tablet (100 mg total) by mouth once daily., Disp: 90 tablet, Rfl: 1  ALLERGIES:   Review of patient's allergies indicates:   Allergen Reactions    Wellbutrin [bupropion hcl] Anxiety     Reviewed office visit on 3/11/20 with Dr. Arreola: L shoulder pain, radicular pain of L UE, XR, MRI    IMAGIN. X-ray ordered due to neck pain. (AP, lateral, bilateral obliques, and odontoid  views) taken 3/11/20.   2. X-ray images were reviewed personally  3. FINDINGS: X-ray images obtained demonstrate no cortical irregularities, sclerosis, osteophyte formation, or subchonral cysts. There is no joint space narrowing.  4. IMPRESSION: No pathology or irregularities appreciated.     IMAGIN. X-ray ordered due to left shoulder pain. (AP, scapular Y, and axillary views) taken 3/11/20.   2. X-ray images were reviewed personally   3. FINDINGS: X-ray images obtained demonstrate no cortical irregularities, sclerosis, osteophyte formation, or subchonral cysts. There is no joint space narrowing.  4. IMPRESSION: No pathology or irregularities appreciated.     IMAGIN. MRI ordered due to left shoulder pain taken 3/11/20.   2. MRI  images were reviewed personally  3. FINDINGS:   Rotator cuff: Minimal low-grade bursal surface fraying of the supraspinatus tendon.  Infraspinatus, teres minor and subscapularis tendons are intact.  Muscle bulk is maintained.  Labrum: Glenoid labrum is intact.  Biceps: Long head biceps tendon is intact.  Bone: There is no fracture. Bone marrow signal is unremarkable. No evidence of a marrow replacement process.  Acromioclavicular joint: The AC joint is unremarkable.  Cartilage: Articular cartilage of the glenohumeral joint is preserved.  Miscellaneous: Mild thickening of  "subacromial subdeltoid bursa.  4. IMPRESSION: Mild low-grade bursal surface fraying of the supraspinatus tendon.  Mild thickening of subacromial subdeltoid bursa.    Objective:     VITAL SIGNS: /87   Pulse 68   Ht 5' 1" (1.549 m)   Wt 81.6 kg (180 lb)   BMI 34.01 kg/m²    General    Nursing note and vitals reviewed.  Constitutional: She is oriented to person, place, and time. She appears well-developed and well-nourished.   HENT:   Head: Normocephalic and atraumatic.   no nasal discharge, no external ear redness or discharge   Eyes:   EOM is full and smooth  No eye redness or discharge   Neck: Neck supple. No tracheal deviation present.   Cardiovascular: Normal rate.    2+ Radial pulse bilaterally  2+ Dorsalis Pedis pulse bilaterally  No LE edema appreciated   Pulmonary/Chest: Effort normal. No respiratory distress.   Abdominal: She exhibits no distension.   No rigidity   Neurological: She is alert and oriented to person, place, and time. She exhibits normal muscle tone. Coordination normal.   See details below   Psychiatric: She has a normal mood and affect. Her behavior is normal.               MUSCULOSKELETAL EXAM  Shoulder: left SHOULDER  The affected shoulder is compared to the contralateral shoulder.    Observation:      SHOULDER  No ecchymosis, edema, or erythema throughout the shoulder girdle.  No sternal, clavicular, or acromial deformities bilaterally.  No atrophy of the pectorals, deltoids, supraspinatus, infraspinatus, or biceps bilaterally.  No asymmetry of shoulders bilaterally.    Posture:  Increased thoracic kyphosis  Gait: Non-antalgic     ROM (* = with pain):  CERVICAL SPINE  Full AROM in flexion, extension, sidebending, and rotation.    SHOULDER  Active flexion to 180° on left and 180° on right.  Active abduction to 180° on left  and 180° on right.  Active internal rotation to T7 on left and T7 on right.    Active external rotation to T4 on left and T4 on right.    No scapular dyskinesia " or winging.    Tenderness:  No tenderness at the SC or AC joint  No tenderness over the clavicle   No tenderness over biceps tendon or bicipital groove  No tenderness over subacromial space    Strength Testing (* = with pain):  Deltoid - 5/5 on left and 5/5 on right  Biceps - 5/5 on left and 5/5 on right  Triceps - 5/5 on left and 5/5 on right  Wrist extension - 5/5 on left and 5/5 on right  Wrist flexion - 5/5 on left and 5/5 on right   - 5/5 on left and 5/5 on right  Finger extension - 5/5 on left and 5/5 on right  Finger abduction - 5/5 on left and 5/5 on right    Special Tests:  Empty can test - negative   Full can test - negative  Bear hug test - negative  Belly press test - negative  Resisted internal rotation - negative  Resisted external rotation - negative    Neer's test - negative  Hawkin's-Antony test - negative  Cross-arm test- negative    OSeamuss test - negative     Sulcus sign - none  AP load and shift laxity - none    Speed's test - negative  Yergason's test - negative    Neurovascular Exam:  Spurlings test - negative bialterally  2+ radial pulses bilaterally  Sensation intact to light touch in the distal median, radial, and ulnar nerve distributions bilaterally.  2+/4 reflexes at triceps, biceps, and brachioradialis  Capillary refill intact <2 seconds in all digits bilaterally      TART (Tissue texture abnormality, Asymmetry,  Restriction of motion and/or Tenderness) changes:    Head: Occipitoatlantal (OA) Joint TTA right and left     Cervical Spine   C1 Neutral   C2 FRS RIGHT   C3 Neutral   C4 Neutral   C5 Neutral   C6 FRS LEFT   C7 ERS LEFT      Thoracic Spine   T1 FRS RIGHT   T2 Neutral   T3 Neutral   T4 Neutral   T5 Neutral   T6 Neutral   T7 FRS RIGHT   T8 FRS RIGHT   T9 FRS RIGHT   T10 Neutral   T11 Neutral   T12 Neutral     Rib cage: R1 inhlaed on rigth, R6 external torsion on right    Upper extremity: Right thoracic outlet TTA      Key   F= Flexed   E = Extended   R = Rotated   S =  Sidebent   TTA = tissue texture abnormality         Assessment:      Encounter Diagnoses   Name Primary?    Subacromial impingement of left shoulder Yes    Subacromial bursitis of left shoulder joint     Myalgia     Somatic dysfunction of head region     Cervical (neck) region somatic dysfunction     Somatic dysfunction of thoracic region     Somatic dysfunction of rib cage region     Upper extremity somatic dysfunction           Plan:     1. Left shoulder pain secondary to impingement from poor thoracic and scapular biomechanics likely secondary to overuse with exercise- improved.  Associated subacromial bursitis noted on MRI from 03/12/2020 has also improved.  Mild supraspinatus fraying also noted on MRI however no significant rotator cuff weakness appreciated on physical exam today.   - OMT performed again today to address associated biomechanical  and HEP reviewed  - continue progression back to baseline workout activity  - Recommend OTC NSAIDs and Ice up to 20 minutes at a time prn for pain control.   -  X-ray images of left shoulder taken 3/11/20 (AP, scapular Y, and axillary views) showed no abnormalities.   -  MRI  images of left shoulder taken 3/11/20 showed Mild low-grade bursal surface fraying of the supraspinatus tendon.  Mild thickening of subacromial subdeltoid bursa.    2. OMT 5-6 regions. Oral consent obtained.  Reviewed benefits and potential side effects.   - OMT indicated today due to signs and symptoms as well as local and remote somatic dysfunction findings and their related neurokinetic, lymphatic, fascial and/or arteriovenous body connections.   - OMT techniques used: Myofascial Release, Muscle Energy and Still's Technique   - Treatment was tolerated well. Improvement noted in segmental mobility post-treatment in dysfunctional regions. There were no adverse events and no complications immediately following treatment.     3. Reviewed with pt. the following HEP:  A)  Arm Augustine  exercises: from the side-laying position, rotate top arm in a clockwise direction x 10 reps, following by rotating in a counter-clockwise position x 10 reps, repeat on other side, twice daily. Hand-out also given.   B)  Supine Thoracic extension exercise: 10-15 reps, twice daily. Handout also given.   C) Lower abdominal muscle isotonic exercises: Rotate pelvis into the 12 o'clock position and holding it to engage lower abdominal muscles. Then lift up leg, one at a time, alternating for 10-15 reps. Repeat exercises 1-2 times daily. Handout given.   D) Corner stretch: hold for 30 seconds, repeat 2-3 times, twice daily    The patient was taught a homegoing physical therapy regimen as described above. The patient demonstrated understanding of the exercises and proper technique of their execution.     4. Follow-up as needed if pain deteriorates or new issue arises    5. Patient agreeable to today's plan and all questions were answered    This note is dictated using the M*Modal Fluency Direct word recognition program. There are word recognition mistakes that are occasionally missed on review.

## 2020-06-02 ENCOUNTER — OFFICE VISIT (OUTPATIENT)
Dept: SPORTS MEDICINE | Facility: CLINIC | Age: 40
End: 2020-06-02
Payer: COMMERCIAL

## 2020-06-02 VITALS
HEART RATE: 68 BPM | WEIGHT: 180 LBS | HEIGHT: 61 IN | SYSTOLIC BLOOD PRESSURE: 128 MMHG | DIASTOLIC BLOOD PRESSURE: 87 MMHG | BODY MASS INDEX: 33.99 KG/M2

## 2020-06-02 DIAGNOSIS — M99.00 SOMATIC DYSFUNCTION OF HEAD REGION: ICD-10-CM

## 2020-06-02 DIAGNOSIS — M75.42 SUBACROMIAL IMPINGEMENT OF LEFT SHOULDER: Primary | ICD-10-CM

## 2020-06-02 DIAGNOSIS — M99.01 CERVICAL (NECK) REGION SOMATIC DYSFUNCTION: ICD-10-CM

## 2020-06-02 DIAGNOSIS — M99.07 UPPER EXTREMITY SOMATIC DYSFUNCTION: ICD-10-CM

## 2020-06-02 DIAGNOSIS — M99.02 SOMATIC DYSFUNCTION OF THORACIC REGION: ICD-10-CM

## 2020-06-02 DIAGNOSIS — M79.10 MYALGIA: ICD-10-CM

## 2020-06-02 DIAGNOSIS — M99.08 SOMATIC DYSFUNCTION OF RIB CAGE REGION: ICD-10-CM

## 2020-06-02 DIAGNOSIS — M75.52 SUBACROMIAL BURSITIS OF LEFT SHOULDER JOINT: ICD-10-CM

## 2020-06-02 PROCEDURE — 99999 PR PBB SHADOW E&M-EST. PATIENT-LVL III: CPT | Mod: PBBFAC,,, | Performed by: NEUROMUSCULOSKELETAL MEDICINE & OMM

## 2020-06-02 PROCEDURE — 99999 PR PBB SHADOW E&M-EST. PATIENT-LVL III: ICD-10-PCS | Mod: PBBFAC,,, | Performed by: NEUROMUSCULOSKELETAL MEDICINE & OMM

## 2020-06-02 PROCEDURE — 98927 OSTEOPATH MANJ 5-6 REGIONS: CPT | Mod: S$GLB,,, | Performed by: NEUROMUSCULOSKELETAL MEDICINE & OMM

## 2020-06-02 PROCEDURE — 99213 OFFICE O/P EST LOW 20 MIN: CPT | Mod: 25,S$GLB,, | Performed by: NEUROMUSCULOSKELETAL MEDICINE & OMM

## 2020-06-02 PROCEDURE — 3008F BODY MASS INDEX DOCD: CPT | Mod: CPTII,S$GLB,, | Performed by: NEUROMUSCULOSKELETAL MEDICINE & OMM

## 2020-06-02 PROCEDURE — 99213 PR OFFICE/OUTPT VISIT, EST, LEVL III, 20-29 MIN: ICD-10-PCS | Mod: 25,S$GLB,, | Performed by: NEUROMUSCULOSKELETAL MEDICINE & OMM

## 2020-06-02 PROCEDURE — 98927 PR OSTEOPATHIC MANIP,5-6 BODY REGN: ICD-10-PCS | Mod: S$GLB,,, | Performed by: NEUROMUSCULOSKELETAL MEDICINE & OMM

## 2020-06-02 PROCEDURE — 3008F PR BODY MASS INDEX (BMI) DOCUMENTED: ICD-10-PCS | Mod: CPTII,S$GLB,, | Performed by: NEUROMUSCULOSKELETAL MEDICINE & OMM

## 2020-07-10 ENCOUNTER — OFFICE VISIT (OUTPATIENT)
Dept: OBSTETRICS AND GYNECOLOGY | Facility: CLINIC | Age: 40
End: 2020-07-10
Payer: COMMERCIAL

## 2020-07-10 VITALS
DIASTOLIC BLOOD PRESSURE: 66 MMHG | SYSTOLIC BLOOD PRESSURE: 108 MMHG | WEIGHT: 185.88 LBS | BODY MASS INDEX: 35.09 KG/M2 | HEIGHT: 61 IN

## 2020-07-10 DIAGNOSIS — Z01.419 WELL WOMAN EXAM WITH ROUTINE GYNECOLOGICAL EXAM: Primary | ICD-10-CM

## 2020-07-10 DIAGNOSIS — Z30.015 ENCOUNTER FOR INITIAL PRESCRIPTION OF VAGINAL RING HORMONAL CONTRACEPTIVE: ICD-10-CM

## 2020-07-10 PROCEDURE — 99395 PREV VISIT EST AGE 18-39: CPT | Mod: S$GLB,,, | Performed by: OBSTETRICS & GYNECOLOGY

## 2020-07-10 PROCEDURE — 99999 PR PBB SHADOW E&M-EST. PATIENT-LVL III: ICD-10-PCS | Mod: PBBFAC,,, | Performed by: OBSTETRICS & GYNECOLOGY

## 2020-07-10 PROCEDURE — 87624 HPV HI-RISK TYP POOLED RSLT: CPT

## 2020-07-10 PROCEDURE — 88175 CYTOPATH C/V AUTO FLUID REDO: CPT

## 2020-07-10 PROCEDURE — 99999 PR PBB SHADOW E&M-EST. PATIENT-LVL III: CPT | Mod: PBBFAC,,, | Performed by: OBSTETRICS & GYNECOLOGY

## 2020-07-10 PROCEDURE — 99395 PR PREVENTIVE VISIT,EST,18-39: ICD-10-PCS | Mod: S$GLB,,, | Performed by: OBSTETRICS & GYNECOLOGY

## 2020-07-10 RX ORDER — ETONOGESTREL AND ETHINYL ESTRADIOL VAGINAL RING .015; .12 MG/D; MG/D
1 RING VAGINAL
Qty: 3 EACH | Refills: 4 | Status: SHIPPED | OUTPATIENT
Start: 2020-07-10 | End: 2021-06-15

## 2020-07-10 NOTE — PROGRESS NOTES
History & Physical  Gynecology      SUBJECTIVE:     Chief Complaint: Annual Exam       History of Present Illness:  Annual Exam-Premenopausal  Patient presents for annual exam. The patient has no complaints today. Menstrual cycles are monthly.  The patient is sexually active. GYN screening history: last pap: approximate date  and was abnormal: ASCUS, neg HPV. The patient participates in regular exercise: yes.  Smoking status:  no    Contraception: nuvaring    FH:  Breast cancer: pat grandmother  Colon cancer: neg  Ovarian cancer: neg    Review of patient's allergies indicates:   Allergen Reactions    Wellbutrin [bupropion hcl] Anxiety       Past Medical History:   Diagnosis Date    Abnormal Pap smear 12 years ago    Cryotherapy    Abnormal Pap smear of cervix     Cervical radiculopathy 2015    Cervical radiculopathy 2015    Enlarged thyroid gland 2015    GERD (gastroesophageal reflux disease)     Obesity (BMI 30-39.9)     Right-sided carotid artery disease 2016     Past Surgical History:   Procedure Laterality Date    APPENDECTOMY      ESOPHAGOGASTRODUODENOSCOPY N/A 2018    Procedure: EGD (ESOPHAGOGASTRODUODENOSCOPY);  Surgeon: Karl Judge MD;  Location: [address];  Service: Endoscopy;  Laterality: N/A;    INGUINAL HERNIA REPAIR Right 2017    MYRINGOTOMY W/ TUBES       OB History        0    Para   0    Term   0       0    AB   0    Living   0       SAB   0    TAB   0    Ectopic   0    Multiple   0    Live Births                   Family History   Problem Relation Age of Onset    Heart disease Mother 40    Diabetes Mother     Depression Mother     Anxiety disorder Mother     Obesity Mother     Hypertension Father     Diabetes Father     Obesity Father     Kidney disease Father     Heart disease Maternal Grandmother     Diabetes Maternal Grandmother     Diabetes Maternal Grandfather     Cancer Paternal Grandmother 58        Breast     Diabetes Paternal Grandmother     Breast cancer Paternal Grandmother     Gestational diabetes Sister     Asthma Sister         as a child    Stroke Neg Hx     Colon cancer Neg Hx     Ovarian cancer Neg Hx      Social History     Tobacco Use    Smoking status: Never Smoker    Smokeless tobacco: Never Used   Substance Use Topics    Alcohol use: Yes     Alcohol/week: 0.0 standard drinks     Comment: social    Drug use: No       Current Outpatient Medications   Medication Sig    etonogestreL-ethinyl estradioL (NUVARING) 0.12-0.015 mg/24 hr vaginal ring Place 1 each vaginally every 28 days.    hydrOXYzine HCl (ATARAX) 10 MG Tab Take 1 tablet (10 mg total) by mouth 3 (three) times daily as needed (anxiety).    sertraline (ZOLOFT) 100 MG tablet Take 1 tablet (100 mg total) by mouth once daily.     No current facility-administered medications for this visit.          Review of Systems:  Review of Systems   Constitutional: Negative for activity change, appetite change and fever.   Respiratory: Negative for shortness of breath.    Cardiovascular: Negative for chest pain.   Gastrointestinal: Negative for abdominal pain, constipation, diarrhea, nausea and vomiting.   Genitourinary: Negative for menorrhagia, menstrual problem, pelvic pain, vaginal bleeding, vaginal discharge and vaginal pain.   Integumentary:  Negative for nipple discharge.   Neurological: Negative for numbness and headaches.   Breast: Negative for mastodynia and nipple discharge       OBJECTIVE:     Physical Exam:  Physical Exam  Vitals signs and nursing note reviewed.   Constitutional:       Appearance: She is well-developed.   Neck:      Musculoskeletal: Normal range of motion and neck supple.      Thyroid: No thyromegaly.      Trachea: No tracheal deviation.   Cardiovascular:      Rate and Rhythm: Normal rate and regular rhythm.      Heart sounds: Normal heart sounds.   Pulmonary:      Effort: Pulmonary effort is normal.      Breath sounds:  Normal breath sounds.   Chest:      Breasts: Breasts are symmetrical.         Right: No inverted nipple, mass, nipple discharge, skin change or tenderness.         Left: No inverted nipple, mass, nipple discharge, skin change or tenderness.   Abdominal:      Palpations: Abdomen is soft.   Genitourinary:     Labia:         Right: No rash, tenderness, lesion or injury.         Left: No rash, tenderness, lesion or injury.       Vagina: Normal. No signs of injury and foreign body. No vaginal discharge, erythema, tenderness or bleeding.      Cervix: No cervical motion tenderness, discharge or friability.      Uterus: Not deviated, not enlarged, not fixed and not tender.       Adnexa:         Right: No mass, tenderness or fullness.          Left: No mass, tenderness or fullness.        Comments: Urethra: normal appearing urethra with no masses, tenderness or lesions  Urethral meatus: normal size, anterior vaginal wall with no prolapse, no lesions  Neurological:      Mental Status: She is alert and oriented to person, place, and time.   Psychiatric:         Behavior: Behavior normal.         Thought Content: Thought content normal.         Judgment: Judgment normal.         Chaperoned by: Caro    ASSESSMENT:       ICD-10-CM ICD-9-CM    1. Well woman exam with routine gynecological exam  Z01.419 V72.31 Liquid-Based Pap Smear, Screening      HPV High Risk Genotypes, PCR   2. Encounter for initial prescription of vaginal ring hormonal contraceptive  Z30.015 V25.02 etonogestreL-ethinyl estradioL (NUVARING) 0.12-0.015 mg/24 hr vaginal ring          Plan:      Laura was seen today for annual exam.    Diagnoses and all orders for this visit:    Well woman exam with routine gynecological exam  -     Liquid-Based Pap Smear, Screening  -     HPV High Risk Genotypes, PCR    Encounter for initial prescription of vaginal ring hormonal contraceptive  -     etonogestreL-ethinyl estradioL (NUVARING) 0.12-0.015 mg/24 hr vaginal ring; Place  1 each vaginally every 28 days.        Orders Placed This Encounter   Procedures    HPV High Risk Genotypes, PCR       Well Woman:  - Pap smear and hpv today  - Birth control: refilled  - GC/CT:n/a  - Mammogram: due age 42  - Smoking cessation: n/a  - Labs: none required   - Vaccines: none required  - Exercise counseling      Follow up in one year for annual or prn.    Cyndy Murcia

## 2020-07-17 LAB
HPV HR 12 DNA SPEC QL NAA+PROBE: NEGATIVE
HPV16 AG SPEC QL: NEGATIVE
HPV18 DNA SPEC QL NAA+PROBE: NEGATIVE

## 2020-07-21 LAB
FINAL PATHOLOGIC DIAGNOSIS: NORMAL
Lab: NORMAL

## 2020-09-24 ENCOUNTER — TELEPHONE (OUTPATIENT)
Dept: INTERNAL MEDICINE | Facility: CLINIC | Age: 40
End: 2020-09-24

## 2020-09-25 ENCOUNTER — TELEPHONE (OUTPATIENT)
Dept: INTERNAL MEDICINE | Facility: CLINIC | Age: 40
End: 2020-09-25

## 2020-09-25 NOTE — LETTER
September 25, 2020    Laura Fitzpatrick  [address]  [place] [address]             [facility] - [facility] Medicine/ Internal Medicine  [address] [place] [address], [address]  [place] [address]  Phone: [phone]  Fax: [phone] Laura Garcia!    Your Zoloft is due for a refill. Dr. Rayo Arreola is no longer practicing with [facility]. Please let us know if you are interested in establishing care with another provider and we can assist you with making an appointment. The physicians who are currently accepting new patients are as follows:    [facility] ([facility], [address], [place] [address]):  - Jacky Webb M.D. (part-time on Mondays and Fridays)  - Deloris Webb M.D.  - Rayo Wells M.D.  - Qasim Thomas M.D.  - Christa Damian M.D.    [facility] ([address] [place] [address]):  - Sandie Tinajero M.D.  - Trudy Wood M.D.  - Jesus Alexander M.D.  - Jazzmine Munroe M.D.  - Claudio Kahn M.D. (Monday and Friday only at [facility])    [facility] ([address] [place] [address]):  - Rosita Swann M.D.  - Pallavi Wyatt M.D.  - Claudio Angel M.D.  - ROB Randhawa M.D.  - Lucho Weir M.D.    We look forward to further assisting you. Please let us know if you have any additional questions or concerns.    Thank you!  [facility]

## 2021-02-05 ENCOUNTER — DOCUMENTATION ONLY (OUTPATIENT)
Dept: REHABILITATION | Facility: HOSPITAL | Age: 41
End: 2021-02-05

## 2021-04-12 ENCOUNTER — PATIENT MESSAGE (OUTPATIENT)
Dept: RESEARCH | Facility: HOSPITAL | Age: 41
End: 2021-04-12

## 2021-06-15 ENCOUNTER — OFFICE VISIT (OUTPATIENT)
Dept: INTERNAL MEDICINE | Facility: CLINIC | Age: 41
End: 2021-06-15
Payer: COMMERCIAL

## 2021-06-15 VITALS
OXYGEN SATURATION: 96 % | WEIGHT: 187.38 LBS | SYSTOLIC BLOOD PRESSURE: 100 MMHG | TEMPERATURE: 97 F | DIASTOLIC BLOOD PRESSURE: 60 MMHG | BODY MASS INDEX: 35.38 KG/M2 | RESPIRATION RATE: 16 BRPM | HEIGHT: 61 IN | HEART RATE: 70 BPM

## 2021-06-15 DIAGNOSIS — Z00.00 ANNUAL PHYSICAL EXAM: Primary | ICD-10-CM

## 2021-06-15 PROCEDURE — 99999 PR PBB SHADOW E&M-EST. PATIENT-LVL III: CPT | Mod: PBBFAC,,, | Performed by: STUDENT IN AN ORGANIZED HEALTH CARE EDUCATION/TRAINING PROGRAM

## 2021-06-15 PROCEDURE — 99999 PR PBB SHADOW E&M-EST. PATIENT-LVL III: ICD-10-PCS | Mod: PBBFAC,,, | Performed by: STUDENT IN AN ORGANIZED HEALTH CARE EDUCATION/TRAINING PROGRAM

## 2021-06-15 PROCEDURE — 99395 PR PREVENTIVE VISIT,EST,18-39: ICD-10-PCS | Mod: S$GLB,,, | Performed by: STUDENT IN AN ORGANIZED HEALTH CARE EDUCATION/TRAINING PROGRAM

## 2021-06-15 PROCEDURE — 99395 PREV VISIT EST AGE 18-39: CPT | Mod: S$GLB,,, | Performed by: STUDENT IN AN ORGANIZED HEALTH CARE EDUCATION/TRAINING PROGRAM

## 2021-06-16 ENCOUNTER — LAB VISIT (OUTPATIENT)
Dept: LAB | Facility: HOSPITAL | Age: 41
End: 2021-06-16
Attending: STUDENT IN AN ORGANIZED HEALTH CARE EDUCATION/TRAINING PROGRAM
Payer: COMMERCIAL

## 2021-06-16 DIAGNOSIS — Z00.00 ANNUAL PHYSICAL EXAM: ICD-10-CM

## 2021-06-16 LAB
25(OH)D3+25(OH)D2 SERPL-MCNC: 33 NG/ML (ref 30–96)
ALBUMIN SERPL BCP-MCNC: 3.9 G/DL (ref 3.5–5.2)
ALP SERPL-CCNC: 79 U/L (ref 55–135)
ALT SERPL W/O P-5'-P-CCNC: 19 U/L (ref 10–44)
ANION GAP SERPL CALC-SCNC: 11 MMOL/L (ref 8–16)
AST SERPL-CCNC: 17 U/L (ref 10–40)
BASOPHILS # BLD AUTO: 0.02 K/UL (ref 0–0.2)
BASOPHILS NFR BLD: 0.5 % (ref 0–1.9)
BILIRUB SERPL-MCNC: 0.6 MG/DL (ref 0.1–1)
BUN SERPL-MCNC: 14 MG/DL (ref 6–20)
CALCIUM SERPL-MCNC: 9.5 MG/DL (ref 8.7–10.5)
CHLORIDE SERPL-SCNC: 106 MMOL/L (ref 95–110)
CHOLEST SERPL-MCNC: 167 MG/DL (ref 120–199)
CHOLEST/HDLC SERPL: 4 {RATIO} (ref 2–5)
CO2 SERPL-SCNC: 24 MMOL/L (ref 23–29)
CREAT SERPL-MCNC: 0.7 MG/DL (ref 0.5–1.4)
DIFFERENTIAL METHOD: ABNORMAL
EOSINOPHIL # BLD AUTO: 0.1 K/UL (ref 0–0.5)
EOSINOPHIL NFR BLD: 2.6 % (ref 0–8)
ERYTHROCYTE [DISTWIDTH] IN BLOOD BY AUTOMATED COUNT: 13.9 % (ref 11.5–14.5)
EST. GFR  (AFRICAN AMERICAN): >60 ML/MIN/1.73 M^2
EST. GFR  (NON AFRICAN AMERICAN): >60 ML/MIN/1.73 M^2
ESTIMATED AVG GLUCOSE: 100 MG/DL (ref 68–131)
GLUCOSE SERPL-MCNC: 88 MG/DL (ref 70–110)
HBA1C MFR BLD: 5.1 % (ref 4–5.6)
HCT VFR BLD AUTO: 42.5 % (ref 37–48.5)
HDLC SERPL-MCNC: 42 MG/DL (ref 40–75)
HDLC SERPL: 25.1 % (ref 20–50)
HGB BLD-MCNC: 13.4 G/DL (ref 12–16)
IMM GRANULOCYTES # BLD AUTO: 0.01 K/UL (ref 0–0.04)
IMM GRANULOCYTES NFR BLD AUTO: 0.2 % (ref 0–0.5)
LDLC SERPL CALC-MCNC: 109.4 MG/DL (ref 63–159)
LYMPHOCYTES # BLD AUTO: 1.3 K/UL (ref 1–4.8)
LYMPHOCYTES NFR BLD: 30.9 % (ref 18–48)
MCH RBC QN AUTO: 28 PG (ref 27–31)
MCHC RBC AUTO-ENTMCNC: 31.5 G/DL (ref 32–36)
MCV RBC AUTO: 89 FL (ref 82–98)
MONOCYTES # BLD AUTO: 0.5 K/UL (ref 0.3–1)
MONOCYTES NFR BLD: 12.6 % (ref 4–15)
NEUTROPHILS # BLD AUTO: 2.2 K/UL (ref 1.8–7.7)
NEUTROPHILS NFR BLD: 53.2 % (ref 38–73)
NONHDLC SERPL-MCNC: 125 MG/DL
NRBC BLD-RTO: 0 /100 WBC
PLATELET # BLD AUTO: 212 K/UL (ref 150–450)
PMV BLD AUTO: 10.6 FL (ref 9.2–12.9)
POTASSIUM SERPL-SCNC: 4.5 MMOL/L (ref 3.5–5.1)
PROT SERPL-MCNC: 7.5 G/DL (ref 6–8.4)
RBC # BLD AUTO: 4.79 M/UL (ref 4–5.4)
SODIUM SERPL-SCNC: 141 MMOL/L (ref 136–145)
TRIGL SERPL-MCNC: 78 MG/DL (ref 30–150)
TSH SERPL DL<=0.005 MIU/L-ACNC: 1.4 UIU/ML (ref 0.4–4)
WBC # BLD AUTO: 4.21 K/UL (ref 3.9–12.7)

## 2021-06-16 PROCEDURE — 82306 VITAMIN D 25 HYDROXY: CPT | Performed by: STUDENT IN AN ORGANIZED HEALTH CARE EDUCATION/TRAINING PROGRAM

## 2021-06-16 PROCEDURE — 36415 COLL VENOUS BLD VENIPUNCTURE: CPT | Mod: PO | Performed by: STUDENT IN AN ORGANIZED HEALTH CARE EDUCATION/TRAINING PROGRAM

## 2021-06-16 PROCEDURE — 80053 COMPREHEN METABOLIC PANEL: CPT | Performed by: STUDENT IN AN ORGANIZED HEALTH CARE EDUCATION/TRAINING PROGRAM

## 2021-06-16 PROCEDURE — 84443 ASSAY THYROID STIM HORMONE: CPT | Performed by: STUDENT IN AN ORGANIZED HEALTH CARE EDUCATION/TRAINING PROGRAM

## 2021-06-16 PROCEDURE — 85025 COMPLETE CBC W/AUTO DIFF WBC: CPT | Performed by: STUDENT IN AN ORGANIZED HEALTH CARE EDUCATION/TRAINING PROGRAM

## 2021-06-16 PROCEDURE — 80061 LIPID PANEL: CPT | Performed by: STUDENT IN AN ORGANIZED HEALTH CARE EDUCATION/TRAINING PROGRAM

## 2021-06-16 PROCEDURE — 83036 HEMOGLOBIN GLYCOSYLATED A1C: CPT | Performed by: STUDENT IN AN ORGANIZED HEALTH CARE EDUCATION/TRAINING PROGRAM

## 2021-06-18 ENCOUNTER — TELEPHONE (OUTPATIENT)
Dept: INTERNAL MEDICINE | Facility: CLINIC | Age: 41
End: 2021-06-18

## 2021-06-18 DIAGNOSIS — Z02.1 PRE-EMPLOYMENT EXAMINATION: Primary | ICD-10-CM

## 2021-06-22 ENCOUNTER — LAB VISIT (OUTPATIENT)
Dept: LAB | Facility: HOSPITAL | Age: 41
End: 2021-06-22
Attending: STUDENT IN AN ORGANIZED HEALTH CARE EDUCATION/TRAINING PROGRAM
Payer: COMMERCIAL

## 2021-06-22 DIAGNOSIS — Z02.1 PRE-EMPLOYMENT EXAMINATION: ICD-10-CM

## 2021-06-22 PROCEDURE — 86706 HEP B SURFACE ANTIBODY: CPT | Performed by: STUDENT IN AN ORGANIZED HEALTH CARE EDUCATION/TRAINING PROGRAM

## 2021-06-22 PROCEDURE — 36415 COLL VENOUS BLD VENIPUNCTURE: CPT | Mod: PO | Performed by: STUDENT IN AN ORGANIZED HEALTH CARE EDUCATION/TRAINING PROGRAM

## 2021-06-23 LAB — HBV SURFACE AB SER-ACNC: NEGATIVE M[IU]/ML

## 2021-06-24 ENCOUNTER — PATIENT MESSAGE (OUTPATIENT)
Dept: INTERNAL MEDICINE | Facility: CLINIC | Age: 41
End: 2021-06-24

## 2021-06-29 ENCOUNTER — PATIENT MESSAGE (OUTPATIENT)
Dept: INTERNAL MEDICINE | Facility: CLINIC | Age: 41
End: 2021-06-29

## 2021-08-15 ENCOUNTER — LAB VISIT (OUTPATIENT)
Dept: PRIMARY CARE CLINIC | Facility: OTHER | Age: 41
End: 2021-08-15
Payer: COMMERCIAL

## 2021-08-15 DIAGNOSIS — Z01.818 PRE-OP TESTING: ICD-10-CM

## 2021-08-15 PROCEDURE — U0003 INFECTIOUS AGENT DETECTION BY NUCLEIC ACID (DNA OR RNA); SEVERE ACUTE RESPIRATORY SYNDROME CORONAVIRUS 2 (SARS-COV-2) (CORONAVIRUS DISEASE [COVID-19]), AMPLIFIED PROBE TECHNIQUE, MAKING USE OF HIGH THROUGHPUT TECHNOLOGIES AS DESCRIBED BY CMS-2020-01-R: HCPCS | Performed by: INTERNAL MEDICINE

## 2021-08-16 ENCOUNTER — OFFICE VISIT (OUTPATIENT)
Dept: URGENT CARE | Facility: CLINIC | Age: 41
End: 2021-08-16
Payer: COMMERCIAL

## 2021-08-16 VITALS
OXYGEN SATURATION: 97 % | HEIGHT: 61 IN | HEART RATE: 77 BPM | SYSTOLIC BLOOD PRESSURE: 109 MMHG | BODY MASS INDEX: 33.04 KG/M2 | RESPIRATION RATE: 17 BRPM | TEMPERATURE: 99 F | WEIGHT: 175 LBS | DIASTOLIC BLOOD PRESSURE: 79 MMHG

## 2021-08-16 DIAGNOSIS — J30.9 ALLERGIC RHINITIS, UNSPECIFIED SEASONALITY, UNSPECIFIED TRIGGER: Primary | ICD-10-CM

## 2021-08-16 DIAGNOSIS — R09.81 SINUS CONGESTION: ICD-10-CM

## 2021-08-16 LAB
CTP QC/QA: YES
SARS-COV-2 RDRP RESP QL NAA+PROBE: NEGATIVE
SARS-COV-2 RNA RESP QL NAA+PROBE: DETECTED
SARS-COV-2- CYCLE NUMBER: 20.78

## 2021-08-16 PROCEDURE — 3078F DIAST BP <80 MM HG: CPT | Mod: CPTII,S$GLB,, | Performed by: NURSE PRACTITIONER

## 2021-08-16 PROCEDURE — 99214 PR OFFICE/OUTPT VISIT, EST, LEVL IV, 30-39 MIN: ICD-10-PCS | Mod: S$GLB,CS,, | Performed by: NURSE PRACTITIONER

## 2021-08-16 PROCEDURE — 3074F PR MOST RECENT SYSTOLIC BLOOD PRESSURE < 130 MM HG: ICD-10-PCS | Mod: CPTII,S$GLB,, | Performed by: NURSE PRACTITIONER

## 2021-08-16 PROCEDURE — 1159F PR MEDICATION LIST DOCUMENTED IN MEDICAL RECORD: ICD-10-PCS | Mod: CPTII,S$GLB,, | Performed by: NURSE PRACTITIONER

## 2021-08-16 PROCEDURE — 3044F PR MOST RECENT HEMOGLOBIN A1C LEVEL <7.0%: ICD-10-PCS | Mod: CPTII,S$GLB,, | Performed by: NURSE PRACTITIONER

## 2021-08-16 PROCEDURE — 1160F PR REVIEW ALL MEDS BY PRESCRIBER/CLIN PHARMACIST DOCUMENTED: ICD-10-PCS | Mod: CPTII,S$GLB,, | Performed by: NURSE PRACTITIONER

## 2021-08-16 PROCEDURE — 3008F PR BODY MASS INDEX (BMI) DOCUMENTED: ICD-10-PCS | Mod: CPTII,S$GLB,, | Performed by: NURSE PRACTITIONER

## 2021-08-16 PROCEDURE — 1159F MED LIST DOCD IN RCRD: CPT | Mod: CPTII,S$GLB,, | Performed by: NURSE PRACTITIONER

## 2021-08-16 PROCEDURE — U0002: ICD-10-PCS | Mod: QW,S$GLB,, | Performed by: NURSE PRACTITIONER

## 2021-08-16 PROCEDURE — 3044F HG A1C LEVEL LT 7.0%: CPT | Mod: CPTII,S$GLB,, | Performed by: NURSE PRACTITIONER

## 2021-08-16 PROCEDURE — 3078F PR MOST RECENT DIASTOLIC BLOOD PRESSURE < 80 MM HG: ICD-10-PCS | Mod: CPTII,S$GLB,, | Performed by: NURSE PRACTITIONER

## 2021-08-16 PROCEDURE — 3008F BODY MASS INDEX DOCD: CPT | Mod: CPTII,S$GLB,, | Performed by: NURSE PRACTITIONER

## 2021-08-16 PROCEDURE — 3074F SYST BP LT 130 MM HG: CPT | Mod: CPTII,S$GLB,, | Performed by: NURSE PRACTITIONER

## 2021-08-16 PROCEDURE — 99214 OFFICE O/P EST MOD 30 MIN: CPT | Mod: S$GLB,CS,, | Performed by: NURSE PRACTITIONER

## 2021-08-16 PROCEDURE — U0002 COVID-19 LAB TEST NON-CDC: HCPCS | Mod: QW,S$GLB,, | Performed by: NURSE PRACTITIONER

## 2021-08-16 PROCEDURE — 1160F RVW MEDS BY RX/DR IN RCRD: CPT | Mod: CPTII,S$GLB,, | Performed by: NURSE PRACTITIONER

## 2021-08-16 RX ORDER — AZELASTINE 1 MG/ML
1 SPRAY, METERED NASAL 2 TIMES DAILY
Qty: 30 ML | Refills: 1 | Status: SHIPPED | OUTPATIENT
Start: 2021-08-16 | End: 2022-11-10

## 2021-11-02 ENCOUNTER — OFFICE VISIT (OUTPATIENT)
Dept: OTOLARYNGOLOGY | Facility: CLINIC | Age: 41
End: 2021-11-02
Payer: COMMERCIAL

## 2021-11-02 VITALS — BODY MASS INDEX: 32.07 KG/M2 | WEIGHT: 169.75 LBS

## 2021-11-02 DIAGNOSIS — H81.12 BPPV (BENIGN PAROXYSMAL POSITIONAL VERTIGO), LEFT: Primary | ICD-10-CM

## 2021-11-02 PROCEDURE — 3044F HG A1C LEVEL LT 7.0%: CPT | Mod: CPTII,S$GLB,, | Performed by: OTOLARYNGOLOGY

## 2021-11-02 PROCEDURE — 1159F MED LIST DOCD IN RCRD: CPT | Mod: CPTII,S$GLB,, | Performed by: OTOLARYNGOLOGY

## 2021-11-02 PROCEDURE — 3008F PR BODY MASS INDEX (BMI) DOCUMENTED: ICD-10-PCS | Mod: CPTII,S$GLB,, | Performed by: OTOLARYNGOLOGY

## 2021-11-02 PROCEDURE — 99203 PR OFFICE/OUTPT VISIT, NEW, LEVL III, 30-44 MIN: ICD-10-PCS | Mod: S$GLB,,, | Performed by: OTOLARYNGOLOGY

## 2021-11-02 PROCEDURE — 1160F PR REVIEW ALL MEDS BY PRESCRIBER/CLIN PHARMACIST DOCUMENTED: ICD-10-PCS | Mod: CPTII,S$GLB,, | Performed by: OTOLARYNGOLOGY

## 2021-11-02 PROCEDURE — 3044F PR MOST RECENT HEMOGLOBIN A1C LEVEL <7.0%: ICD-10-PCS | Mod: CPTII,S$GLB,, | Performed by: OTOLARYNGOLOGY

## 2021-11-02 PROCEDURE — 1159F PR MEDICATION LIST DOCUMENTED IN MEDICAL RECORD: ICD-10-PCS | Mod: CPTII,S$GLB,, | Performed by: OTOLARYNGOLOGY

## 2021-11-02 PROCEDURE — 99999 PR PBB SHADOW E&M-EST. PATIENT-LVL II: CPT | Mod: PBBFAC,,, | Performed by: OTOLARYNGOLOGY

## 2021-11-02 PROCEDURE — 99203 OFFICE O/P NEW LOW 30 MIN: CPT | Mod: S$GLB,,, | Performed by: OTOLARYNGOLOGY

## 2021-11-02 PROCEDURE — 99999 PR PBB SHADOW E&M-EST. PATIENT-LVL II: ICD-10-PCS | Mod: PBBFAC,,, | Performed by: OTOLARYNGOLOGY

## 2021-11-02 PROCEDURE — 1160F RVW MEDS BY RX/DR IN RCRD: CPT | Mod: CPTII,S$GLB,, | Performed by: OTOLARYNGOLOGY

## 2021-11-02 PROCEDURE — 3008F BODY MASS INDEX DOCD: CPT | Mod: CPTII,S$GLB,, | Performed by: OTOLARYNGOLOGY

## 2021-11-23 ENCOUNTER — OFFICE VISIT (OUTPATIENT)
Dept: INTERNAL MEDICINE | Facility: CLINIC | Age: 41
End: 2021-11-23
Payer: COMMERCIAL

## 2021-11-23 VITALS
RESPIRATION RATE: 16 BRPM | SYSTOLIC BLOOD PRESSURE: 112 MMHG | OXYGEN SATURATION: 98 % | WEIGHT: 167.13 LBS | HEART RATE: 64 BPM | BODY MASS INDEX: 31.55 KG/M2 | HEIGHT: 61 IN | DIASTOLIC BLOOD PRESSURE: 80 MMHG

## 2021-11-23 DIAGNOSIS — F41.9 ANXIETY: Primary | ICD-10-CM

## 2021-11-23 PROCEDURE — 99213 OFFICE O/P EST LOW 20 MIN: CPT | Mod: S$GLB,,, | Performed by: STUDENT IN AN ORGANIZED HEALTH CARE EDUCATION/TRAINING PROGRAM

## 2021-11-23 PROCEDURE — 99999 PR PBB SHADOW E&M-EST. PATIENT-LVL III: ICD-10-PCS | Mod: PBBFAC,,, | Performed by: STUDENT IN AN ORGANIZED HEALTH CARE EDUCATION/TRAINING PROGRAM

## 2021-11-23 PROCEDURE — 99213 PR OFFICE/OUTPT VISIT, EST, LEVL III, 20-29 MIN: ICD-10-PCS | Mod: S$GLB,,, | Performed by: STUDENT IN AN ORGANIZED HEALTH CARE EDUCATION/TRAINING PROGRAM

## 2021-11-23 PROCEDURE — 99999 PR PBB SHADOW E&M-EST. PATIENT-LVL III: CPT | Mod: PBBFAC,,, | Performed by: STUDENT IN AN ORGANIZED HEALTH CARE EDUCATION/TRAINING PROGRAM

## 2021-11-23 RX ORDER — MULTIVITAMIN
1 TABLET ORAL DAILY
COMMUNITY

## 2021-11-23 RX ORDER — DULOXETIN HYDROCHLORIDE 20 MG/1
20 CAPSULE, DELAYED RELEASE ORAL DAILY
Qty: 30 CAPSULE | Refills: 11 | Status: SHIPPED | OUTPATIENT
Start: 2021-11-23 | End: 2022-01-25

## 2022-01-21 ENCOUNTER — OFFICE VISIT (OUTPATIENT)
Dept: OBSTETRICS AND GYNECOLOGY | Facility: CLINIC | Age: 42
End: 2022-01-21
Payer: COMMERCIAL

## 2022-01-21 VITALS
DIASTOLIC BLOOD PRESSURE: 82 MMHG | HEIGHT: 61 IN | BODY MASS INDEX: 30.71 KG/M2 | SYSTOLIC BLOOD PRESSURE: 130 MMHG | WEIGHT: 162.69 LBS

## 2022-01-21 DIAGNOSIS — Z01.419 WELL WOMAN EXAM WITH ROUTINE GYNECOLOGICAL EXAM: Primary | ICD-10-CM

## 2022-01-21 DIAGNOSIS — Z12.31 ENCOUNTER FOR SCREENING MAMMOGRAM FOR MALIGNANT NEOPLASM OF BREAST: ICD-10-CM

## 2022-01-21 PROCEDURE — 3079F DIAST BP 80-89 MM HG: CPT | Mod: CPTII,S$GLB,, | Performed by: OBSTETRICS & GYNECOLOGY

## 2022-01-21 PROCEDURE — 99395 PR PREVENTIVE VISIT,EST,18-39: ICD-10-PCS | Mod: S$GLB,,, | Performed by: OBSTETRICS & GYNECOLOGY

## 2022-01-21 PROCEDURE — 99999 PR PBB SHADOW E&M-EST. PATIENT-LVL III: ICD-10-PCS | Mod: PBBFAC,,, | Performed by: OBSTETRICS & GYNECOLOGY

## 2022-01-21 PROCEDURE — 3008F PR BODY MASS INDEX (BMI) DOCUMENTED: ICD-10-PCS | Mod: CPTII,S$GLB,, | Performed by: OBSTETRICS & GYNECOLOGY

## 2022-01-21 PROCEDURE — 1160F RVW MEDS BY RX/DR IN RCRD: CPT | Mod: CPTII,S$GLB,, | Performed by: OBSTETRICS & GYNECOLOGY

## 2022-01-21 PROCEDURE — 3075F SYST BP GE 130 - 139MM HG: CPT | Mod: CPTII,S$GLB,, | Performed by: OBSTETRICS & GYNECOLOGY

## 2022-01-21 PROCEDURE — 99999 PR PBB SHADOW E&M-EST. PATIENT-LVL III: CPT | Mod: PBBFAC,,, | Performed by: OBSTETRICS & GYNECOLOGY

## 2022-01-21 PROCEDURE — 99395 PREV VISIT EST AGE 18-39: CPT | Mod: S$GLB,,, | Performed by: OBSTETRICS & GYNECOLOGY

## 2022-01-21 PROCEDURE — 3075F PR MOST RECENT SYSTOLIC BLOOD PRESS GE 130-139MM HG: ICD-10-PCS | Mod: CPTII,S$GLB,, | Performed by: OBSTETRICS & GYNECOLOGY

## 2022-01-21 PROCEDURE — 1159F PR MEDICATION LIST DOCUMENTED IN MEDICAL RECORD: ICD-10-PCS | Mod: CPTII,S$GLB,, | Performed by: OBSTETRICS & GYNECOLOGY

## 2022-01-21 PROCEDURE — 1159F MED LIST DOCD IN RCRD: CPT | Mod: CPTII,S$GLB,, | Performed by: OBSTETRICS & GYNECOLOGY

## 2022-01-21 PROCEDURE — 1160F PR REVIEW ALL MEDS BY PRESCRIBER/CLIN PHARMACIST DOCUMENTED: ICD-10-PCS | Mod: CPTII,S$GLB,, | Performed by: OBSTETRICS & GYNECOLOGY

## 2022-01-21 PROCEDURE — 3008F BODY MASS INDEX DOCD: CPT | Mod: CPTII,S$GLB,, | Performed by: OBSTETRICS & GYNECOLOGY

## 2022-01-21 PROCEDURE — 3079F PR MOST RECENT DIASTOLIC BLOOD PRESSURE 80-89 MM HG: ICD-10-PCS | Mod: CPTII,S$GLB,, | Performed by: OBSTETRICS & GYNECOLOGY

## 2022-01-21 NOTE — PROGRESS NOTES
History & Physical  Gynecology      SUBJECTIVE:     Chief Complaint: Well Woman       History of Present Illness:  Annual Exam-Premenopausal  Patient presents for annual exam. The patient has no complaints today. Menstrual cycles are monthly.  The patient is sexually active. GYN screening history: last pap: approximate date  and was abnormal: NILM, neg HPV. The patient participates in regular exercise: yes- training for half marathon.  Smoking status:  no    Contraception: condoms    FH:  Breast cancer: pat grandmother  Colon cancer: neg  Ovarian cancer: neg    Review of patient's allergies indicates:   Allergen Reactions    Wellbutrin [bupropion hcl] Anxiety       Past Medical History:   Diagnosis Date    Abnormal Pap smear 12 years ago    Cryotherapy    Abnormal Pap smear of cervix     Allergy     Cervical radiculopathy 2015    Cervical radiculopathy 2015    Enlarged thyroid gland 2015    GERD (gastroesophageal reflux disease)     Obesity (BMI 30-39.9)     Right-sided carotid artery disease 2016     Past Surgical History:   Procedure Laterality Date    APPENDECTOMY      ESOPHAGOGASTRODUODENOSCOPY N/A 2018    Procedure: EGD (ESOPHAGOGASTRODUODENOSCOPY);  Surgeon: Karl Judge MD;  Location: [address]);  Service: Endoscopy;  Laterality: N/A;    INGUINAL HERNIA REPAIR Right 2017    MYRINGOTOMY W/ TUBES       OB History        0    Para   0    Term   0       0    AB   0    Living   0       SAB   0    IAB   0    Ectopic   0    Multiple   0    Live Births                   Family History   Problem Relation Age of Onset    Heart disease Mother 40    Diabetes Mother     Depression Mother     Anxiety disorder Mother     Obesity Mother     Hypertension Father     Diabetes Father     Obesity Father     Kidney disease Father     Heart disease Maternal Grandmother     Diabetes Maternal Grandmother     Diabetes Maternal Grandfather     Cancer  Paternal Grandmother 58        Breast    Diabetes Paternal Grandmother     Breast cancer Paternal Grandmother     Gestational diabetes Sister     Asthma Sister         as a child    Stroke Neg Hx     Colon cancer Neg Hx     Ovarian cancer Neg Hx      Social History     Tobacco Use    Smoking status: Never Smoker    Smokeless tobacco: Never Used   Substance Use Topics    Alcohol use: Yes     Alcohol/week: 0.0 standard drinks     Comment: social    Drug use: No       Current Outpatient Medications   Medication Sig    azelastine (ASTELIN) 137 mcg (0.1 %) nasal spray 1 spray (137 mcg total) by Nasal route 2 (two) times daily.    DULoxetine (CYMBALTA) 20 MG capsule Take 1 capsule (20 mg total) by mouth once daily.    multivitamin (THERAGRAN) per tablet Take 1 tablet by mouth once daily.     No current facility-administered medications for this visit.         Review of Systems:  Review of Systems   Constitutional: Negative for activity change, appetite change and fever.   Respiratory: Negative for shortness of breath.    Cardiovascular: Negative for chest pain.   Gastrointestinal: Negative for abdominal pain, nausea and vomiting.   Genitourinary: Negative for menorrhagia, menstrual problem, pelvic pain, vaginal bleeding, vaginal discharge and vaginal pain.   Integumentary:  Negative for breast mass.   Breast: Negative for lump and mass       OBJECTIVE:     Physical Exam:  Physical Exam  Vitals and nursing note reviewed.   Constitutional:       Appearance: She is well-developed.   Neck:      Thyroid: No thyromegaly.      Trachea: No tracheal deviation.   Cardiovascular:      Rate and Rhythm: Normal rate and regular rhythm.      Heart sounds: Normal heart sounds.   Pulmonary:      Effort: Pulmonary effort is normal.      Breath sounds: Normal breath sounds.   Chest:   Breasts: Breasts are symmetrical.      Right: No inverted nipple, mass, nipple discharge, skin change or tenderness.      Left: No inverted  nipple, mass, nipple discharge, skin change or tenderness.       Abdominal:      Palpations: Abdomen is soft.   Genitourinary:     Labia:         Right: No rash, tenderness, lesion or injury.         Left: No rash, tenderness, lesion or injury.       Vagina: Normal. No signs of injury and foreign body. No vaginal discharge, erythema, tenderness or bleeding.      Cervix: No cervical motion tenderness, discharge or friability.      Uterus: Not deviated, not enlarged, not fixed and not tender.       Adnexa:         Right: No mass, tenderness or fullness.          Left: No mass, tenderness or fullness.        Comments: Urethra: normal appearing urethra with no masses, tenderness or lesions  Urethral meatus: normal size, anterior vaginal wall with no prolapse, no lesions  Musculoskeletal:      Cervical back: Normal range of motion and neck supple.   Neurological:      Mental Status: She is alert and oriented to person, place, and time.   Psychiatric:         Behavior: Behavior normal.         Thought Content: Thought content normal.         Judgment: Judgment normal.         Chaperoned by: Cheri    ASSESSMENT:       ICD-10-CM ICD-9-CM    1. Well woman exam with routine gynecological exam  Z01.419 V72.31    2. Encounter for screening mammogram for malignant neoplasm of breast  Z12.31 V76.12 Mammo Digital Screening Bilat w/ Michael          Plan:      Laura was seen today for well woman.    Diagnoses and all orders for this visit:    Well woman exam with routine gynecological exam    Encounter for screening mammogram for malignant neoplasm of breast  -     Mammo Digital Screening Bilat w/ Michael; Future        Orders Placed This Encounter   Procedures    Mammo Digital Screening Bilat w/ Michael       Well Woman:  - Pap smear up to date  - Birth control: condoms  - GC/CT:n/a  - Mammogram: ordered/scheduled  - Smoking cessation: n/a  - Labs: none required   - Vaccines: none required  - Exercise counseling      Follow up in one year  for annual or prn.    Cyndy Murcia

## 2022-01-25 ENCOUNTER — PATIENT MESSAGE (OUTPATIENT)
Dept: INTERNAL MEDICINE | Facility: CLINIC | Age: 42
End: 2022-01-25
Payer: COMMERCIAL

## 2022-01-25 DIAGNOSIS — F41.9 ANXIETY: ICD-10-CM

## 2022-01-25 RX ORDER — DULOXETIN HYDROCHLORIDE 60 MG/1
60 CAPSULE, DELAYED RELEASE ORAL DAILY
Qty: 90 CAPSULE | Refills: 3 | Status: SHIPPED | OUTPATIENT
Start: 2022-01-25 | End: 2022-11-10

## 2022-01-25 NOTE — TELEPHONE ENCOUNTER
The new order I sent is 60mg Cymbalta daily. So your old one of 20mg daily, you can take 3 daily until you run out and the new prescription will be just 1 capsule daily. That way you aren't wasting what you already have. Please let me know if you need anything else.

## 2022-06-13 ENCOUNTER — TELEPHONE (OUTPATIENT)
Dept: RADIOLOGY | Facility: HOSPITAL | Age: 42
End: 2022-06-13
Payer: COMMERCIAL

## 2022-06-13 ENCOUNTER — HOSPITAL ENCOUNTER (OUTPATIENT)
Dept: RADIOLOGY | Facility: HOSPITAL | Age: 42
Discharge: HOME | End: 2022-06-13
Attending: OBSTETRICS & GYNECOLOGY
Payer: COMMERCIAL

## 2022-06-13 VITALS — WEIGHT: 162 LBS | BODY MASS INDEX: 30.58 KG/M2 | HEIGHT: 61 IN

## 2022-06-13 DIAGNOSIS — Z12.31 ENCOUNTER FOR SCREENING MAMMOGRAM FOR MALIGNANT NEOPLASM OF BREAST: ICD-10-CM

## 2022-06-13 PROCEDURE — 77067 SCR MAMMO BI INCL CAD: CPT | Mod: TC

## 2022-06-13 PROCEDURE — 77067 SCR MAMMO BI INCL CAD: CPT | Mod: 26,,, | Performed by: RADIOLOGY

## 2022-06-13 PROCEDURE — 77063 BREAST TOMOSYNTHESIS BI: CPT | Mod: TC

## 2022-06-13 PROCEDURE — 77063 BREAST TOMOSYNTHESIS BI: CPT | Mod: 26,,, | Performed by: RADIOLOGY

## 2022-06-13 PROCEDURE — 77063 MAMMO DIGITAL SCREENING BILAT WITH TOMO: ICD-10-PCS | Mod: 26,,, | Performed by: RADIOLOGY

## 2022-06-13 PROCEDURE — 77067 MAMMO DIGITAL SCREENING BILAT WITH TOMO: ICD-10-PCS | Mod: 26,,, | Performed by: RADIOLOGY

## 2022-06-13 NOTE — TELEPHONE ENCOUNTER
Spoke with patient and explained mammogram findings.Patient expressed understanding of results. Patient scheduled abnormal mammogram follow up appointment at [facility] on 6/17/2022.

## 2022-06-17 ENCOUNTER — HOSPITAL ENCOUNTER (OUTPATIENT)
Dept: RADIOLOGY | Facility: HOSPITAL | Age: 42
Discharge: HOME | End: 2022-06-17
Attending: OBSTETRICS & GYNECOLOGY
Payer: COMMERCIAL

## 2022-06-17 DIAGNOSIS — R92.8 ABNORMAL FINDING ON BREAST IMAGING: ICD-10-CM

## 2022-06-17 PROCEDURE — 76642 US BREAST RIGHT LIMITED: ICD-10-PCS | Mod: 26,RT,, | Performed by: RADIOLOGY

## 2022-06-17 PROCEDURE — 76642 ULTRASOUND BREAST LIMITED: CPT | Mod: TC,RT

## 2022-06-17 PROCEDURE — 77065 DX MAMMO INCL CAD UNI: CPT | Mod: 26,RT,, | Performed by: RADIOLOGY

## 2022-06-17 PROCEDURE — 77061 BREAST TOMOSYNTHESIS UNI: CPT | Mod: 26,RT,, | Performed by: RADIOLOGY

## 2022-06-17 PROCEDURE — 77065 MAMMO DIGITAL DIAGNOSTIC RIGHT WITH TOMO: ICD-10-PCS | Mod: 26,RT,, | Performed by: RADIOLOGY

## 2022-06-17 PROCEDURE — 77065 DX MAMMO INCL CAD UNI: CPT | Mod: TC,RT

## 2022-06-17 PROCEDURE — 77061 MAMMO DIGITAL DIAGNOSTIC RIGHT WITH TOMO: ICD-10-PCS | Mod: 26,RT,, | Performed by: RADIOLOGY

## 2022-06-17 PROCEDURE — 76642 ULTRASOUND BREAST LIMITED: CPT | Mod: 26,RT,, | Performed by: RADIOLOGY

## 2022-08-05 ENCOUNTER — TELEPHONE (OUTPATIENT)
Dept: SPORTS MEDICINE | Facility: CLINIC | Age: 42
End: 2022-08-05
Payer: COMMERCIAL

## 2022-08-05 DIAGNOSIS — M25.562 LEFT KNEE PAIN, UNSPECIFIED CHRONICITY: Primary | ICD-10-CM

## 2022-08-05 NOTE — TELEPHONE ENCOUNTER
Spoke to the Pt about her appt with the Dr and stated that we will need more time for the visit due to the new injury. We were able to find a date and time that worked for her.  Also informed her of the xrays that we will get before the visit with the Dr.

## 2022-08-11 ENCOUNTER — OFFICE VISIT (OUTPATIENT)
Dept: SPORTS MEDICINE | Facility: CLINIC | Age: 42
End: 2022-08-11
Payer: COMMERCIAL

## 2022-08-11 ENCOUNTER — HOSPITAL ENCOUNTER (OUTPATIENT)
Dept: RADIOLOGY | Facility: HOSPITAL | Age: 42
Discharge: HOME | End: 2022-08-11
Attending: NEUROMUSCULOSKELETAL MEDICINE & OMM
Payer: COMMERCIAL

## 2022-08-11 VITALS
BODY MASS INDEX: 33.04 KG/M2 | SYSTOLIC BLOOD PRESSURE: 102 MMHG | DIASTOLIC BLOOD PRESSURE: 72 MMHG | HEIGHT: 61 IN | WEIGHT: 175 LBS

## 2022-08-11 DIAGNOSIS — M99.04 SACRAL REGION SOMATIC DYSFUNCTION: ICD-10-CM

## 2022-08-11 DIAGNOSIS — M99.02 SOMATIC DYSFUNCTION OF THORACIC REGION: ICD-10-CM

## 2022-08-11 DIAGNOSIS — M99.03 SOMATIC DYSFUNCTION OF LUMBAR REGION: ICD-10-CM

## 2022-08-11 DIAGNOSIS — M99.05 SOMATIC DYSFUNCTION OF PELVIC REGION: ICD-10-CM

## 2022-08-11 DIAGNOSIS — M25.562 LEFT KNEE PAIN, UNSPECIFIED CHRONICITY: ICD-10-CM

## 2022-08-11 DIAGNOSIS — M79.10 MYALGIA: ICD-10-CM

## 2022-08-11 DIAGNOSIS — M99.06 SOMATIC DYSFUNCTION OF LOWER EXTREMITY: ICD-10-CM

## 2022-08-11 DIAGNOSIS — M76.32 IT BAND SYNDROME, LEFT: Primary | ICD-10-CM

## 2022-08-11 PROCEDURE — 3078F PR MOST RECENT DIASTOLIC BLOOD PRESSURE < 80 MM HG: ICD-10-PCS | Mod: CPTII,S$GLB,, | Performed by: NEUROMUSCULOSKELETAL MEDICINE & OMM

## 2022-08-11 PROCEDURE — 99214 OFFICE O/P EST MOD 30 MIN: CPT | Mod: 25,S$GLB,, | Performed by: NEUROMUSCULOSKELETAL MEDICINE & OMM

## 2022-08-11 PROCEDURE — 99214 PR OFFICE/OUTPT VISIT, EST, LEVL IV, 30-39 MIN: ICD-10-PCS | Mod: 25,S$GLB,, | Performed by: NEUROMUSCULOSKELETAL MEDICINE & OMM

## 2022-08-11 PROCEDURE — 3074F PR MOST RECENT SYSTOLIC BLOOD PRESSURE < 130 MM HG: ICD-10-PCS | Mod: CPTII,S$GLB,, | Performed by: NEUROMUSCULOSKELETAL MEDICINE & OMM

## 2022-08-11 PROCEDURE — 73564 X-RAY EXAM KNEE 4 OR MORE: CPT | Mod: 26,LT,, | Performed by: RADIOLOGY

## 2022-08-11 PROCEDURE — 73562 XR KNEE ORTHO LEFT WITH FLEXION: ICD-10-PCS | Mod: 26,RT,, | Performed by: RADIOLOGY

## 2022-08-11 PROCEDURE — 98927 PR OSTEOPATHIC MANIP,5-6 BODY REGN: ICD-10-PCS | Mod: S$GLB,,, | Performed by: NEUROMUSCULOSKELETAL MEDICINE & OMM

## 2022-08-11 PROCEDURE — 99999 PR PBB SHADOW E&M-EST. PATIENT-LVL III: CPT | Mod: PBBFAC,,, | Performed by: NEUROMUSCULOSKELETAL MEDICINE & OMM

## 2022-08-11 PROCEDURE — 73562 X-RAY EXAM OF KNEE 3: CPT | Mod: 26,RT,, | Performed by: RADIOLOGY

## 2022-08-11 PROCEDURE — 3008F BODY MASS INDEX DOCD: CPT | Mod: CPTII,S$GLB,, | Performed by: NEUROMUSCULOSKELETAL MEDICINE & OMM

## 2022-08-11 PROCEDURE — 73562 X-RAY EXAM OF KNEE 3: CPT | Mod: TC,PO,RT

## 2022-08-11 PROCEDURE — 1159F PR MEDICATION LIST DOCUMENTED IN MEDICAL RECORD: ICD-10-PCS | Mod: CPTII,S$GLB,, | Performed by: NEUROMUSCULOSKELETAL MEDICINE & OMM

## 2022-08-11 PROCEDURE — 73564 XR KNEE ORTHO LEFT WITH FLEXION: ICD-10-PCS | Mod: 26,LT,, | Performed by: RADIOLOGY

## 2022-08-11 PROCEDURE — 1160F RVW MEDS BY RX/DR IN RCRD: CPT | Mod: CPTII,S$GLB,, | Performed by: NEUROMUSCULOSKELETAL MEDICINE & OMM

## 2022-08-11 PROCEDURE — 3078F DIAST BP <80 MM HG: CPT | Mod: CPTII,S$GLB,, | Performed by: NEUROMUSCULOSKELETAL MEDICINE & OMM

## 2022-08-11 PROCEDURE — 3074F SYST BP LT 130 MM HG: CPT | Mod: CPTII,S$GLB,, | Performed by: NEUROMUSCULOSKELETAL MEDICINE & OMM

## 2022-08-11 PROCEDURE — 98927 OSTEOPATH MANJ 5-6 REGIONS: CPT | Mod: S$GLB,,, | Performed by: NEUROMUSCULOSKELETAL MEDICINE & OMM

## 2022-08-11 PROCEDURE — 1160F PR REVIEW ALL MEDS BY PRESCRIBER/CLIN PHARMACIST DOCUMENTED: ICD-10-PCS | Mod: CPTII,S$GLB,, | Performed by: NEUROMUSCULOSKELETAL MEDICINE & OMM

## 2022-08-11 PROCEDURE — 1159F MED LIST DOCD IN RCRD: CPT | Mod: CPTII,S$GLB,, | Performed by: NEUROMUSCULOSKELETAL MEDICINE & OMM

## 2022-08-11 PROCEDURE — 3008F PR BODY MASS INDEX (BMI) DOCUMENTED: ICD-10-PCS | Mod: CPTII,S$GLB,, | Performed by: NEUROMUSCULOSKELETAL MEDICINE & OMM

## 2022-08-11 PROCEDURE — 97110 PR THERAPEUTIC EXERCISES: ICD-10-PCS | Mod: S$GLB,,, | Performed by: NEUROMUSCULOSKELETAL MEDICINE & OMM

## 2022-08-11 PROCEDURE — 99999 PR PBB SHADOW E&M-EST. PATIENT-LVL III: ICD-10-PCS | Mod: PBBFAC,,, | Performed by: NEUROMUSCULOSKELETAL MEDICINE & OMM

## 2022-08-11 PROCEDURE — 97110 THERAPEUTIC EXERCISES: CPT | Mod: S$GLB,,, | Performed by: NEUROMUSCULOSKELETAL MEDICINE & OMM

## 2022-08-11 NOTE — PROGRESS NOTES
Subjective:     Laura Fitzpatrick     Chief Complaint   Patient presents with    Left Knee - Pain         HPI    Laura is a 42 y.o. female coming in today for left knee pain that began aobut 1 month(s) ago. Pt. describes the pain as a 2/10 achy pain that does not radiate. There was not a fall/injury/ or trauma associated with the onset of symptoms. Pt is a runner and reports lateral left knee pain during and after runs. The pain is better with rest, ice, occasional ibuprofen and worse with activity, running, standing after sitting. Pt. Denies any other musculoskeletal complaints at this time.     Joint instability? no  Mechanical locking/clicking? no  Affecting ADL's? yes  Affecting sleep? no    Occupation: speech therapist, [employer] currently    PAST MEDICAL HISTORY:   Past Medical History:   Diagnosis Date    Abnormal Pap smear 12 years ago    Cryotherapy    Abnormal Pap smear of cervix     Allergy     Cervical radiculopathy 4/29/2015    Cervical radiculopathy 4/29/2015    Enlarged thyroid gland 1/26/2015    GERD (gastroesophageal reflux disease)     Obesity (BMI 30-39.9)     Right-sided carotid artery disease 2/8/2016     PAST SURGICAL HISTORY:   Past Surgical History:   Procedure Laterality Date    APPENDECTOMY  2011    ESOPHAGOGASTRODUODENOSCOPY N/A 9/20/2018    Procedure: EGD (ESOPHAGOGASTRODUODENOSCOPY);  Surgeon: Karl Judge MD;  Location: [address];  Service: Endoscopy;  Laterality: N/A;    INGUINAL HERNIA REPAIR Right 2017    MYRINGOTOMY W/ TUBES       FAMILY HISTORY:   Family History   Problem Relation Age of Onset    Heart disease Mother 40    Diabetes Mother     Depression Mother     Anxiety disorder Mother     Obesity Mother     Hypertension Father     Diabetes Father     Obesity Father     Kidney disease Father     Heart disease Maternal Grandmother     Diabetes Maternal Grandmother     Diabetes Maternal Grandfather     Cancer Paternal  "Grandmother 58        Breast    Diabetes Paternal Grandmother     Breast cancer Paternal Grandmother     Gestational diabetes Sister     Asthma Sister         as a child    Stroke Neg Hx     Colon cancer Neg Hx     Ovarian cancer Neg Hx      SOCIAL HISTORY:   Social History     Socioeconomic History    Marital status: Single   Occupational History    Occupation: Speech pathologist    Tobacco Use    Smoking status: Never Smoker    Smokeless tobacco: Never Used   Substance and Sexual Activity    Alcohol use: Yes     Alcohol/week: 0.0 standard drinks     Comment: social    Drug use: No    Sexual activity: Yes     Partners: Male   Social History Narrative    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,  Sporadic exercise       MEDICATIONS:     Current Outpatient Medications:     azelastine (ASTELIN) 137 mcg (0.1 %) nasal spray, 1 spray (137 mcg total) by Nasal route 2 (two) times daily., Disp: 30 mL, Rfl: 1    DULoxetine (CYMBALTA) 60 MG capsule, Take 1 capsule (60 mg total) by mouth once daily., Disp: 90 capsule, Rfl: 3    multivitamin (THERAGRAN) per tablet, Take 1 tablet by mouth once daily., Disp: , Rfl:   ALLERGIES:   Review of patient's allergies indicates:   Allergen Reactions    Wellbutrin [bupropion hcl] Anxiety       Objective:     VITAL SIGNS: /72   Ht 5' 1" (1.549 m)   Wt 79.4 kg (175 lb)   BMI 33.07 kg/m²    General    Vitals reviewed.  Constitutional: She is oriented to person, place, and time. She appears well-developed and well-nourished.   Neurological: She is alert and oriented to person, place, and time.   Psychiatric: She has a normal mood and affect. Her behavior is normal.                 MUSCULOSKELETAL EXAM    left KNEE EXAMINATION   Affected side is compared to contralateral knee     Observation:  No edema, erythema, ecchymosis, or effusion noted.  No muscle atrophy of the thighs and calves noted.  No obvious bony deformities noted.   No Genu valgus/varum noted.  No recurvatum noted.  "   No tibial internal/external torsion.    Posture:  Posterior pelvis tilt with loss of lumbar lordosis  Gait: Non-antalgic with Neutral ankle mechanics and Neutral medial arch    Tenderness:  Patella - none    Lateral joint line - none  Quad tendon - none   Medial joint line - none  Patellar tendon - none   Medial plica - none  Tibial tubercle - none   Lateral plica - none  Pes anserine - none   MCL prox - none  Distal ITB - +    MCL distal - none  MFC - none    LCL prox - none  LFC - none    LCL distal - none  Tibia - none    Fibula - none    No obvious bursae, plicae, popliteal cysts, or tendon derangement palpated.          ROM (* = with pain):   Active extension to 0° on left without hyperextension, lag, crepitus, or patellar J sign.   Active extension to 0° on right without hyperextension, lag, crepitus, or patellar J sign.  Active flexion to 135° on left and 135° on right    Strength(* = with pain):  Knee Flexion - 5/5 on left and 5/5 on right  Knee Extension - 5/5 on left and 5/5 on right  Hip Flexion - 5/5 on left and 5/5 on right  Hip Extension - 5/5 on left and 5/5 on right  Ankle dorsiflexion - 5/5 on left and 5/5 on right  Ankle Plantarflexion - 5/5 on left and 5/5 on right  glutaeus medius - 5/5 on left and 5/5 on right    Patellofemoral Exam:   Patellar ballottement - negative  Bulge sign - negative  Patellar grind - negative    No patellar laxity with medial and lateral translation   No apprehension with medial and lateral patellar translation.     Meniscus Testing:     No pain with terminal extension and flexion at joint lines.  Shans test - negative   Thesaly test - negative  Bounce home test - negative    Ligament Testing:  Lachman's test - negative  No laxity with anterior drawer.  No laxity with posterior drawer.    No posterior sag sign.   No laxity with varus testing at 0 and 30 degrees.  No laxity with valgus testing at 0 and 30 degrees.    IT band testing:  Fabianos test - positive  Noble  Compression test - positive    Neurovascular Examination:   Normal gait without antalgia.  Sensation intact to light touch in the obturator, lateral/intermediate/medial/posterior femoral cutaneous, saphenous, and common peroneal nerves bilaterally.  Hamstring/gluteal firing pattern abnormal with compensatory muscle firing pattern to the ipsilateral hamstring and glut. Max inhibition  Motor Function:    Fully intact motor function at hip, knee, foot and ankle.  Negative seated straight leg raise bilaterally.    Pulses intact at the DP and PT arteries bilaterally.    Capillary refill intact <2 seconds in all toes bilaterally.    TART (Tissue texture abnormality, Asymmetry,  Restriction of motion and/or Tenderness) changes:     Thoracic Spine   T1 Neutral   T2 Neutral   T3 Neutral   T4 Neutral   T5 Neutral   T6 Neutral   T7 Neutral   T8 Neutral   T9 Neutral   T10 Neutral   T11 NS-right,R-left   T12 NS-right,R-left     Rib cage: Neutral     Lumbar Spine   L1 NS-right,R-left   L2 NS-right,R-left   L3 Neutral   L4 FRS LEFT   L5 FRS LEFT       Pelvis:  · Innominate:Left superior shear  · Pubic bone:Left superior pubic shear    Sacrum:Right on Left sacral torsion    Lower Extremity:  · Leg lengths symmetric following OMT to the pelvis    Location/joint Finding/restriction   Fibular head Neutral   Tibia External torsion on left   Talocrural joint Left   Subtalar Joint Neutral   Cuboid Neutral   Talo-navicular joint Left   Navicular-cuneiform joint Left   1st, 2nd, 3rd, 4th, 5th Cuneiform-metatarsal joint Neutral   1st, 2nd, 3rd, 4th, 5th metatarsal Neutral   1st, 2nd, 3rd, 4th, 5th phalange Neutral   Left distal IT band Trigger band (TB) fascial distortion and Herniated trigger point (HTP) fascial distortion        Key   F= Flexed   E = Extended   R = Rotated   S = Sidebent   TTA = tissue texture abnormality     IMAGIN. X-ray ordered due to left knee pain. (AP bilateral standing, PA bilateral standing in flexion,  bilateral merchants, and  left lateral views) taken today.   2. X-ray images were reviewed personally by me and then directly with patient.  3. FINDINGS: X-ray images obtained demonstrate:  Right: Bony mineralization is preserved. No acute fracture. No suspicious lytic or blastic lesions.  Tibiofemoral and patellofemoral joints are maintained.  No dislocation or subluxation.     Left: Bony mineralization is preserved. No acute fracture. No suspicious lytic or blastic lesions.  Tibiofemoral and patellofemoral joints are maintained.  No dislocation or subluxation.  No joint effusion.  4. IMPRESSION: No significant findings.     Assessment:      Encounter Diagnoses   Name Primary?    It band syndrome, left Yes    Myalgia     Somatic dysfunction of lumbar region     Sacral region somatic dysfunction     Somatic dysfunction of pelvic region     Somatic dysfunction of thoracic region     Somatic dysfunction of lower extremity           Plan:   1. Left lateral knee pain likely secondary to IT band syndrome with associated biomechanical restrictions of the lower kinetic chain and gluteus jesus inhibition  - OMT performed today to address associated biomechanical restrictions  and HEP started.   - Referral to outpatient PT ([facility]) for neuromuscular retraining, gluteus jesus strengthening, home exercise program and possible dry needling  - recommend over-the-counter Voltaren 1% gel to apply to the left IT band up to 4 times a day as needed for pain control  - recommend lower load aerobic activity such as biking, elliptical, or swimming to substitute running until pain improves.  Patient cleared to run short distances as long as she stays below threshold of pain with running  - recommend avoiding repetitive squats or lunges, as this is likely to exacerbate patient's pain  -  X-ray images of left knee taken today (AP bilateral standing, PA bilateral standing in flexion, bilateral merchants, and  left  lateral views) showed no abnormalities. Images were personally reviewed with patient.    2. OMT 5-6 regions. Oral consent obtained.  Reviewed benefits and potential side effects, including bruising at site of treatment and increased soreness for the next 24-48 hours. Pt. Instructed to increased water intake by 1 L today and tomorrow to help with any residual soreness.   - OMT indicated today due to signs and symptoms as well as local and remote somatic dysfunction findings and their related neurokinetic, lymphatic, fascial and/or arteriovenous body connections.   - OMT techniques used: Myofascial Release, Muscle Energy, Fascial Distortion Model and Articulatory   - Treatment was tolerated well. Improvement noted in segmental mobility post-treatment in dysfunctional regions. There were no adverse events and no complications immediately following treatment.     3. Pt. Given the following HEP:  A)  Pelvic clock exercises given to do from the 6-12 o'clock positions:10-15 reps, twice daily. Hand out of exercise also given.   B) Quadratus lumborum self-stretch on all fours: hold stretch for 30 seconds, repeating 2-3 times on each side. Do stretch twice daily. Hand-out also given.   C) IT band rolling: recommend using rolling stick or foam roller to roll out IT band tension bilaterally, 1-2 times a day.     [address] HOME EXERCISE PROGRAM (HEP):  The patient was taught a homegoing physical therapy regimen as described above. The patient demonstrated understanding of the exercises and proper technique of their execution. This interaction took 15 minutes.     4. Follow-up upon completion of PT if pain persist or deteriorates    5. Patient agreeable to today's plan and all questions were answered    This note is dictated using the M*Modal Fluency Direct word recognition program. There are word recognition mistakes that are occasionally missed on review.

## 2022-08-23 ENCOUNTER — CLINICAL SUPPORT (OUTPATIENT)
Dept: REHABILITATION | Facility: HOSPITAL | Age: 42
End: 2022-08-23
Payer: COMMERCIAL

## 2022-08-23 DIAGNOSIS — M76.32 IT BAND SYNDROME, LEFT: ICD-10-CM

## 2022-08-23 DIAGNOSIS — Z74.09 IMPAIRED MOBILITY AND ADLS: Primary | ICD-10-CM

## 2022-08-23 DIAGNOSIS — Z78.9 IMPAIRED MOBILITY AND ADLS: Primary | ICD-10-CM

## 2022-08-23 DIAGNOSIS — M79.10 MYALGIA: ICD-10-CM

## 2022-08-23 PROCEDURE — 97140 MANUAL THERAPY 1/> REGIONS: CPT

## 2022-08-23 PROCEDURE — 97161 PT EVAL LOW COMPLEX 20 MIN: CPT

## 2022-08-23 PROCEDURE — 97110 THERAPEUTIC EXERCISES: CPT

## 2022-08-23 NOTE — PLAN OF CARE
[facility]  Physical Therapy Initial Evaluation    Date: 8/23/2022   Name: Laura Fitzpatrick  Clinic Number: [id number]    Therapy Diagnosis:   Encounter Diagnoses   Name Primary?    It band syndrome, left     Myalgia     Impaired mobility and ADLs Yes     Physician: Camilla Gifford DO    Physician Orders: PT Eval and Treat  Medical Diagnosis from Referral:   M76.32 (ICD-10-CM) - It band syndrome, left   M79.10 (ICD-10-CM) - Myalgia   Evaluation Date: 8/23/2022  Authorization Period Expiration: 8/11/2023  Plan of Care Expiration: 11/23/2022  Visit # / Visits authorized: 1 / 1    Time In: 345p  Time Out: 445p  Total Appointment Time (timed & untimed codes): 60 minutes    Precautions: Standard     Subjective   Date of onset: 1-2 months ago   History of current condition - Laura reports: left knee pain gradually started 2 months ago. Patient does not report a specific mechanism of injury. Aggravating factors include quick sit<>stands and running. Easing factors include rest. Pt points to the lateral knee/distal IT band as the source of pain. She denies any pain radiating proximally or distally. She denies any N/T or gait instability. Pt states nothing changed in her running program but her knee just began to hurt and never got any better. She denies running on a banked surface. She states she runs on the sidewalk. She states she is preparing for a half marathon in November. She denies any mechanical symptoms in the knee. She denies any previous injury or surgery to the knee.      Medical History:   Past Medical History:   Diagnosis Date    Abnormal Pap smear 12 years ago    Cryotherapy    Abnormal Pap smear of cervix     Allergy     Cervical radiculopathy 4/29/2015    Cervical radiculopathy 4/29/2015    Enlarged thyroid gland 1/26/2015    GERD (gastroesophageal reflux disease)     Obesity (BMI 30-39.9)     Right-sided carotid artery disease 2/8/2016       Surgical History:    Laura Fitzpatrick  has a past surgical history that includes Appendectomy (2011); Myringotomy w/ tubes; Inguinal hernia repair (Right, 2017); and Esophagogastroduodenoscopy (N/A, 9/20/2018).    Medications:   Laura has a current medication list which includes the following prescription(s): azelastine, duloxetine, and multivitamin.    Allergies:   Review of patient's allergies indicates:   Allergen Reactions    Wellbutrin [bupropion hcl] Anxiety        Imaging, X-ray (8/11/22): Left: Bony mineralization is preserved. No acute fracture. No suspicious lytic or blastic lesions.  Tibiofemoral and patellofemoral joints are maintained.  No dislocation or subluxation.  No joint effusion.     Impression:     As above.    Prior Therapy: N  Social History: lives with their spouse  Occupation: speech therapist  Prior Level of Function: I  Current Level of Function: I; increased pain along L Lateral knee and unable to run     Pain:  Current 2/10, worst 4/10, best 0/10   Location:  left knee  Description: Aching    Patients goals: be able to run in November for races; decrease pain    Objective   Gait:   B hip drop    Femoral IR L>R     Observation:   (L) lateral tibia shift      Range of Motion:   Knee Left active Left Passive Right Active R passive   Flexion Full; initial pain but not after repeated motions Full; no pain with overpressure Full Full   Extension 0 +2 0 +2       Hip ROM: Limited flexion/ER/IR on LLE with firm end-feel at end-range    Joint Mobility:  (L) patellar mobility is WNL   Tibiofemoral: L - WNL; no pain with mobility assessment     Lower Extremity Strength    Quad Set: good  SLR: good with no lag    Right LE  Left LE    Hip extension:  3+/5 Hip extension: 3+/5   Hip abduction: 3+/5 Hip abduction: 3-/5   PGM: 3-/5 PGM:  3-/5       Special Tests:  - ITB Friction Test: (+)    Functional Testing:     DL Squat: no pain   SL Squat: initially painful along distal IT band; 1/10 pain following  treatment    Balance Assessment:    Evaluation   Single Limb Stance R LE 15s  (<10 sec = HIGH FALL RISK)   Single Limb Stance L LE 7s; increased trunk sway and hip drop  (<10 sec = HIGH FALL RISK)        Palpation: tender along distal ITB and LFC; no tenderness to lateral joint line     Sensation: WNL    Flexibility: tight HF's 1jt and 2jt.      Edema: none    Limitation/Restriction for FOTO Knee Survey    Therapist reviewed FOTO scores for Laura Fitzpatrick on 8/23/2022.   FOTO documents entered into [employer] - see Media section.    Limitation Score: 33%  Predcited Limitation Score: 18%         TREATMENT   Treatment Time In: 425p  Treatment Time Out: 445p  Total Treatment time (time-based codes) separate from Evaluation: 20 minutes    Laura received therapeutic exercises to develop strength, endurance, ROM, and flexibility for 12 minutes including:      Half kneel HF stretch with strap 3 x 30s   Quadruped HHR x15    SL Clams 3 x 30s holds    Standing Hip Ext leaning onto mat x10; 5s holds    Education - HEP / Prognosis       Laura received the following manual therapy techniques: Joint mobilizations were applied to the: L knee and L hip for 8 minutes, including:     Long axis distraction manipulation - pt agreeable    Inf Glides to L hip; grade 4    Medial glide into flexion     Home Exercises and Patient Education Provided    Education provided:   - HEP  - POC/prognosis    Written Home Exercises Provided: yes.  Exercises were reviewed and Laura was able to demonstrate them prior to the end of the session.  Laura demonstrated good  understanding of the education provided.     See EMR under Patient Instructions for exercises provided 8/23/2022.    Assessment   Laura is a 42 y.o. female referred to outpatient Physical Therapy with a medical diagnosis of It band syndrome, left and myalgia. Patient presents with signs and symptoms consistent with ITB syndrome due to poor motor control and weak hip abductors. Following manual  intervention and ther-ex she performed SL Squats with 1/10 pain. Pt presents with limited hip ROM; LE weakness; tenderness of ITB; tightness of HF's, and functional limitations of inability to run due to increased pain. Patient would benefit from skilled PT to hip mobility, improve motor control and strength in order to facilitate a return to running pain-free     Pt to be seen 1-2x/week for 12x weeks     Patient prognosis is Good.   Patientt will benefit from skilled outpatient Physical Therapy to address the deficits stated above and in the chart below, provide patient /family education, and to maximize patientt's level of independence.     Plan of care discussed with patient: Yes  Patient's spiritual, cultural and educational needs considered and patient is agreeable to the plan of care and goals as stated below:     Anticipated Barriers for therapy: none    Medical Necessity is demonstrated by the following  History  Co-morbidities and personal factors that may impact the plan of care Co-morbidities:   high BMI    Personal Factors:   no deficits     low   Examination  Body Structures and Functions, activity limitations and participation restrictions that may impact the plan of care Body Regions:   lower extremities    Body Systems:    gross symmetry  ROM  strength  gross coordinated movement  balance  gait  transfers  motor control  motor learning  edema    Participation Restrictions:   Half marathon's    Activity limitations:     no deficits    General Tasks and Commands  no deficits    Communication  no deficits    Mobility  running and sit<>stands    Self care  no deficits    Domestic Life  no deficits    Interactions/Relationships  no deficits    Life Areas  no deficits    Community and Social Life  community life  recreation and leisure         moderate   Clinical Presentation stable and uncomplicated low   Decision Making/ Complexity Score: low     Goals:  Short Term Goals: (4 weeks)  1. Pt will be  independent with HEP in order to supplement patient in improving functional mobility. - progressing, not met  2. Pt will improve (L) knee AROM to full and pain-free to demonstrate reduced ITB irritability- progressing, not met  3. Pt will improve hip flexor/quadriceps mobility to WNL in order to improve hip/knee AROM and reduce ITB tension - progressing, not met  4. Pt will be able to perform SL Squats full and pain-free to demonstrate improve motor control and SL stability. - progressing, not met     Long Term Goals: (12 weeks)  1. Pt will be independent with updated HEP supplement PT in improving functional mobility. - progressing, not met  2. Pt will improve FOTO knee survey score to </= prediced % limited in order to demo improved functional mobility. - progressing, not met  3. Pt will return to running pain-free to demonstrate a return to PLOF - progressing, not met    Plan   Plan of care Certification: 8/23/2022 to 11/23/2022.    Improve L hip ROM   Improve activation of Glute Med/PGM/Glute Max   Stretch TFL/Hip flexors      Outpatient Physical Therapy 1-2 times weekly for 12weeks to include the following interventions: Gait Training, Manual Therapy, Moist Heat/ Ice, Neuromuscular Re-ed, Patient Education, Self Care, Therapeutic Activities and Therapeutic Exercise.     Jb Multani, PT

## 2022-08-25 NOTE — PROGRESS NOTES
"  Physical Therapy Daily Treatment Note     Name: Laura Fitzpatrick  Clinic Number: [id number]    Therapy Diagnosis:        Encounter Diagnoses   Name Primary?    It band syndrome, left      Myalgia      Impaired mobility and ADLs Yes      Physician: Camilla Gifford DO     Physician Orders: PT Eval and Treat  Medical Diagnosis from Referral:   M76.32 (ICD-10-CM) - It band syndrome, left   M79.10 (ICD-10-CM) - Myalgia   Evaluation Date: 8/23/2022  Authorization Period Expiration: 8/11/2023  Plan of Care Expiration: 11/23/2022  Visit # / Visits authorized: 2 / 20     Time In: 1450  Time Out: 1550  Total Appointment Time (timed & untimed codes): 60 minutes     Precautions: Standard       Subjective     Pt reports: no pain at present time, min pain L knee laterally increasing throughout the day with walking.   She was compliant with home exercise program.  Response to previous treatment: no isses   Functional change: pain with jogging and prolonged walking     Pain: 0/10  Location: left knee      Objective     Laura received therapeutic exercises to develop strength, endurance, ROM, flexibility, posture and core stabilization for 40 minutes including:                Stationary Bike for 10' for increased ROM, circulation, and mm                         strength/endurance    B hip ext on edge of table 3x10   B GTB clamshells               Half kneel HF stretch with strap 3 x 30s              Quadruped HHR x15               SL Clams 3 x 30s holds    B quad sets 3x10ea    B SLR 3x10/3"              GTB hip abd Bridges                Laura received the following manual therapy techniques: Joint mobilizations, Manual traction, Myofacial release, Manual Lymphatic Drainage and Soft tissue Mobilization were applied to the: L hip/knee  for 10 minutes, including:  Long axis distraction manipulation   Roller L ITB, HS and quad   L hip posterior glide      Laura received cold pack for 10 minutes to to decrease circulation, pain, " and swelling.      Home Exercises Provided and Patient Education Provided     Education provided:   Compliance with HEP    Written Home Exercises Provided: yes.  Exercises were reviewed and Laura was able to demonstrate them prior to the end of the session.  Laura demonstrated good  understanding of the education provided.   Assessment   Pt tolerating tx well. Min hip and quad mm soreness and fatigue after TX. VC/TC for correcting form/technique with therex. Continue to progress as tolerated.    Laura Is progressing well towards her goals.   Pt prognosis is Good.     Pt will continue to benefit from skilled outpatient physical therapy to address the deficits listed in the problem list box on initial evaluation, provide pt/family education and to maximize pt's level of independence in the home and community environment.     Pt's spiritual, cultural and educational needs considered and pt agreeable to plan of care and goals.    Anticipated barriers to physical therapy: none     Goals:   Goals:  Short Term Goals: (4 weeks)  1. Pt will be independent with HEP in order to supplement patient in improving functional mobility. - progressing, not met  2. Pt will improve (L) knee AROM to full and pain-free to demonstrate reduced ITB irritability- progressing, not met  3. Pt will improve hip flexor/quadriceps mobility to WNL in order to improve hip/knee AROM and reduce ITB tension - progressing, not met  4. Pt will be able to perform SL Squats full and pain-free to demonstrate improve motor control and SL stability. - progressing, not met     Long Term Goals: (12 weeks)  1. Pt will be independent with updated HEP supplement PT in improving functional mobility. - progressing, not met  2. Pt will improve FOTO knee survey score to </= prediced % limited in order to demo improved functional mobility. - progressing, not met  3. Pt will return to running pain-free to demonstrate a return to PLOF - progressing, not met      Plan      Continue with POC     Teodoro Johnston, PTA, STS

## 2022-08-26 ENCOUNTER — CLINICAL SUPPORT (OUTPATIENT)
Dept: REHABILITATION | Facility: HOSPITAL | Age: 42
End: 2022-08-26
Payer: COMMERCIAL

## 2022-08-26 PROCEDURE — 97110 THERAPEUTIC EXERCISES: CPT | Mod: CQ

## 2022-08-26 PROCEDURE — 97140 MANUAL THERAPY 1/> REGIONS: CPT | Mod: CQ

## 2022-08-30 ENCOUNTER — CLINICAL SUPPORT (OUTPATIENT)
Dept: REHABILITATION | Facility: HOSPITAL | Age: 42
End: 2022-08-30
Payer: COMMERCIAL

## 2022-08-30 PROCEDURE — 97110 THERAPEUTIC EXERCISES: CPT | Mod: CQ

## 2022-08-30 PROCEDURE — 97140 MANUAL THERAPY 1/> REGIONS: CPT | Mod: CQ

## 2022-08-30 NOTE — PROGRESS NOTES
"  Physical Therapy Daily Treatment Note     Name: Laura Fitzpatrick  Clinic Number: [id number]    Therapy Diagnosis:        Encounter Diagnoses   Name Primary?    It band syndrome, left      Myalgia      Impaired mobility and ADLs Yes      Physician: Camilla Gifford DO     Physician Orders: PT Eval and Treat  Medical Diagnosis from Referral:   M76.32 (ICD-10-CM) - It band syndrome, left   M79.10 (ICD-10-CM) - Myalgia   Evaluation Date: 8/23/2022  Authorization Period Expiration: 8/11/2023  Plan of Care Expiration: 11/23/2022  Visit # / Visits authorized: 3 / 20     Time In: ***  Time Out: ***  Total Appointment Time (timed & untimed codes): *** minutes     Precautions: Standard       Subjective     Pt reports: no pain at present time, min pain L knee laterally increasing throughout the day with walking.   She was compliant with home exercise program.  Response to previous treatment: no isses   Functional change: pain with jogging and prolonged walking     Pain: 0/10  Location: left knee      Objective     Laura received therapeutic exercises to develop strength, endurance, ROM, flexibility, posture and core stabilization for 40 minutes including:                Stationary Bike for 10' for increased ROM, circulation, and mm                         strength/endurance    B hip ext on edge of table 3x10   B GTB clamshells               Half kneel HF stretch with strap 3 x 30s              Quadruped HHR x15               SL Clams 3 x 30s holds    B quad sets 3x10ea    B SLR 3x10/3"              GTB hip abd Bridges                Laura received the following manual therapy techniques: Joint mobilizations, Manual traction, Myofacial release, Manual Lymphatic Drainage and Soft tissue Mobilization were applied to the: L hip/knee  for 10 minutes, including:  Long axis distraction manipulation   Roller L ITB, HS and quad   L hip posterior glide        Home Exercises Provided and Patient Education Provided     Education " provided:   Compliance with HEP    Written Home Exercises Provided: yes.  Exercises were reviewed and Laura was able to demonstrate them prior to the end of the session.  Laura demonstrated good  understanding of the education provided.   Assessment   Pt tolerating tx well. Min hip and quad mm soreness and fatigue after TX. VC/TC for correcting form/technique with therex. Continue to progress as tolerated.      Laura Is progressing well towards her goals.   Pt prognosis is Good.     Pt will continue to benefit from skilled outpatient physical therapy to address the deficits listed in the problem list box on initial evaluation, provide pt/family education and to maximize pt's level of independence in the home and community environment.     Pt's spiritual, cultural and educational needs considered and pt agreeable to plan of care and goals.    Anticipated barriers to physical therapy: none     Goals:   Goals:  Short Term Goals: (4 weeks)  1. Pt will be independent with HEP in order to supplement patient in improving functional mobility. - progressing, not met  2. Pt will improve (L) knee AROM to full and pain-free to demonstrate reduced ITB irritability- progressing, not met  3. Pt will improve hip flexor/quadriceps mobility to WNL in order to improve hip/knee AROM and reduce ITB tension - progressing, not met  4. Pt will be able to perform SL Squats full and pain-free to demonstrate improve motor control and SL stability. - progressing, not met     Long Term Goals: (12 weeks)  1. Pt will be independent with updated HEP supplement PT in improving functional mobility. - progressing, not met  2. Pt will improve FOTO knee survey score to </= prediced % limited in order to demo improved functional mobility. - progressing, not met  3. Pt will return to running pain-free to demonstrate a return to PLOF - progressing, not met      Plan     Continue with POC     Jb Multani, PT

## 2022-08-30 NOTE — PROGRESS NOTES
"  Physical Therapy Daily Treatment Note     Name: Laura Fitzpatrick  Clinic Number: [id number]    Therapy Diagnosis:        Encounter Diagnoses   Name Primary?    It band syndrome, left      Myalgia      Impaired mobility and ADLs Yes      Physician: Camilla Gifford DO     Physician Orders: PT Eval and Treat  Medical Diagnosis from Referral:   M76.32 (ICD-10-CM) - It band syndrome, left   M79.10 (ICD-10-CM) - Myalgia   Evaluation Date: 8/23/2022  Authorization Period Expiration: 8/11/2023  Plan of Care Expiration: 11/23/2022  Visit # / Visits authorized: 3/ 20     Time In: 1500  Time Out: 1600  Total Appointment Time (timed & untimed codes): 60 minutes     Precautions: Standard     Subjective     Pt reports: no pain at present time, min pain L knee laterally with prolonged sitting to stand and gait  She was compliant with home exercise program.  Response to previous treatment: felt better   Functional change: unable to jog     Pain: 0/10  Location: left knee      Objective     Laura received therapeutic exercises to develop strength, endurance, ROM, flexibility, posture and core stabilization for 50 minutes including:    Stationary bike for increased ROM, strength, and endurance LV 10'  L HSS 3x/30"  B heel cord stretch 3x/30"  B hip flexor stretch plinth 3x30" ea  B GTB clamshells   B GTB hip abd bridges 3x10   B hip ext on edge of table 3x10             Supine butt winks 15x ea    Biofeedback 10' 5/10 pV 710                          Laura received the following manual therapy techniques: Joint mobilizations, Manual traction, Myofacial release, Manual Lymphatic Drainage and Soft tissue Mobilization were applied to the: L hip/knee  for 10 minutes, including:  Long axis distraction manipulation   Roller L ITB, HS and quad   L hip posterior glide      Laura received cold pack for 0 minutes to to decrease circulation, pain, and swelling.      Home Exercises Provided and Patient Education Provided     Education provided: "   Compliance with HEP    Written Home Exercises Provided: yes.  Exercises were reviewed and Laura was able to demonstrate them prior to the end of the session.  Laura demonstrated good  understanding of the education provided.   Assessment   Pt tolerating tx well. Continued with stretching and strengthening hip, gluts and quads. VC/TC for correcting form/technique with therex. Pt showing improvement with isolating glut contraction with winks Continue to progress as tolerated.    Laura Is progressing well towards her goals.   Pt prognosis is Good.     Pt will continue to benefit from skilled outpatient physical therapy to address the deficits listed in the problem list box on initial evaluation, provide pt/family education and to maximize pt's level of independence in the home and community environment.     Pt's spiritual, cultural and educational needs considered and pt agreeable to plan of care and goals.    Anticipated barriers to physical therapy: none     Goals:   Goals:  Short Term Goals: (4 weeks)  1. Pt will be independent with HEP in order to supplement patient in improving functional mobility. - progressing, not met  2. Pt will improve (L) knee AROM to full and pain-free to demonstrate reduced ITB irritability- progressing, not met  3. Pt will improve hip flexor/quadriceps mobility to WNL in order to improve hip/knee AROM and reduce ITB tension - progressing, not met  4. Pt will be able to perform SL Squats full and pain-free to demonstrate improve motor control and SL stability. - progressing, not met     Long Term Goals: (12 weeks)  1. Pt will be independent with updated HEP supplement PT in improving functional mobility. - progressing, not met  2. Pt will improve FOTO knee survey score to </= prediced % limited in order to demo improved functional mobility. - progressing, not met  3. Pt will return to running pain-free to demonstrate a return to PLOF - progressing, not met      Plan     Continue with POC      Teodoro Johnston, PTA, STS

## 2022-09-06 ENCOUNTER — CLINICAL SUPPORT (OUTPATIENT)
Dept: REHABILITATION | Facility: HOSPITAL | Age: 42
End: 2022-09-06
Payer: COMMERCIAL

## 2022-09-06 PROCEDURE — 97140 MANUAL THERAPY 1/> REGIONS: CPT | Mod: CQ

## 2022-09-06 PROCEDURE — 97110 THERAPEUTIC EXERCISES: CPT | Mod: CQ

## 2022-09-06 NOTE — PROGRESS NOTES
"  Physical Therapy Daily Treatment Note     Name: Laura Fitzpatrick  Clinic Number: [id number]    Therapy Diagnosis:        Encounter Diagnoses   Name Primary?    It band syndrome, left      Myalgia      Impaired mobility and ADLs Yes      Physician: Camilla Gifford DO     Physician Orders: PT Eval and Treat  Medical Diagnosis from Referral:   M76.32 (ICD-10-CM) - It band syndrome, left   M79.10 (ICD-10-CM) - Myalgia   Evaluation Date: 8/23/2022  Authorization Period Expiration: 8/11/2023  Plan of Care Expiration: 11/23/2022  Visit # / Visits authorized: 4/ 20     Time In: 1430  Time Out: 1530  Total Appointment Time (timed & untimed codes): 60 minutes     Precautions: Standard     Subjective     Pt reports: no pain at present time, stating less issues with pain in knee after prolonged sitting but "occasionally it still happens"  She was compliant with home exercise program.  Response to previous treatment: felt better   Functional change: unable to jog     Pain: 0/10  Location: left knee      Objective     Laura received therapeutic exercises to develop strength, endurance, ROM, flexibility, posture and core stabilization for 50 minutes including:    Stationary bike for increased ROM, strength, and endurance LV 10'  L HSS 3x/30"  B heel cord stretch 3x/30"  B hip flexor stretch plinth 3x30" ea  Prone hip flexor/quad stretch w/strap and 1/2 bolster 3x/30"  B GTB clamshells 3x10 ea  B GTB hip abd bridges 3x10   B hip ext on edge of table 3x10             Supine butt winks 15x ea       B SL bridges 3x10/3"   Biofeedback 10' 5/10 pV 710 unable to perform - long pants                           Laura received the following manual therapy techniques: Joint mobilizations, Manual traction, Myofacial release, Manual Lymphatic Drainage and Soft tissue Mobilization were applied to the: L hip/knee  for 10 minutes, including:  Long axis distraction manipulation   Roller L ITB, HS and quad   L femoral posterior glide      Laura " received cold pack for 0 minutes to to decrease circulation, pain, and swelling.      Home Exercises Provided and Patient Education Provided     Education provided:   Compliance with HEP    Written Home Exercises Provided: yes.  Exercises were reviewed and Laura was able to demonstrate them prior to the end of the session.  Laura demonstrated good  understanding of the education provided.   Assessment   Pt tolerating tx well. Progressing well with stretching and strengthening hip, gluts and quads. VC/TC for correcting form/technique with therex. Pt showing improvement with isolating glut contraction with winks. Appropriated fatigue level after tx. Laura Is progressing well towards her goals.   Pt prognosis is Good.     Pt will continue to benefit from skilled outpatient physical therapy to address the deficits listed in the problem list box on initial evaluation, provide pt/family education and to maximize pt's level of independence in the home and community environment.     Pt's spiritual, cultural and educational needs considered and pt agreeable to plan of care and goals.    Anticipated barriers to physical therapy: none     Goals:   Goals:  Short Term Goals: (4 weeks)  1. Pt will be independent with HEP in order to supplement patient in improving functional mobility. - progressing, not met  2. Pt will improve (L) knee AROM to full and pain-free to demonstrate reduced ITB irritability- progressing, not met  3. Pt will improve hip flexor/quadriceps mobility to WNL in order to improve hip/knee AROM and reduce ITB tension - progressing, not met  4. Pt will be able to perform SL Squats full and pain-free to demonstrate improve motor control and SL stability. - progressing, not met     Long Term Goals: (12 weeks)  1. Pt will be independent with updated HEP supplement PT in improving functional mobility. - progressing, not met  2. Pt will improve FOTO knee survey score to </= prediced % limited in order to demo improved  functional mobility. - progressing, not met  3. Pt will return to running pain-free to demonstrate a return to PLOF - progressing, not met      Plan     Continue with POC     Teodoro Johnston, PTA, STS

## 2022-09-08 ENCOUNTER — CLINICAL SUPPORT (OUTPATIENT)
Dept: REHABILITATION | Facility: HOSPITAL | Age: 42
End: 2022-09-08
Payer: COMMERCIAL

## 2022-09-08 PROCEDURE — 97110 THERAPEUTIC EXERCISES: CPT | Mod: CQ

## 2022-09-08 PROCEDURE — 97140 MANUAL THERAPY 1/> REGIONS: CPT | Mod: CQ

## 2022-09-08 NOTE — PROGRESS NOTES
"  Physical Therapy Daily Treatment Note     Name: Laura Fitzpatrick  Clinic Number: [id number]    Therapy Diagnosis:        Encounter Diagnoses   Name Primary?    It band syndrome, left      Myalgia      Impaired mobility and ADLs Yes      Physician: Camilla Gifford DO     Physician Orders: PT Eval and Treat  Medical Diagnosis from Referral:   M76.32 (ICD-10-CM) - It band syndrome, left   M79.10 (ICD-10-CM) - Myalgia   Evaluation Date: 8/23/2022  Authorization Period Expiration: 8/11/2023  Plan of Care Expiration: 11/23/2022  Visit # / Visits authorized: 4/ 20     Time In: 1430  Time Out: 1530  Total Appointment Time (timed & untimed codes): 60 minutes     Precautions: Standard     Subjective     Pt reports: min pain in L lateral knee after walk/jog this morning. Stating no pain with jogging but started bothering later in day.   She was compliant with home exercise program.  Response to previous treatment: felt better   Functional change: pain after jogging    Pain: 2/10  Location: left knee      Objective     Laura received therapeutic exercises to develop strength, endurance, ROM, flexibility, posture and core stabilization for 50 minutes including:    Stationary bike for increased ROM, strength, and endurance LV 10'  L ITB stretch supine with strap 4x/30"  L HSS long sitting 3x/30"  L  heel cord stretch strap 3x/30"  B hip flexor stretch plinth 3x30" ea  B shuttle press 62.5# 3x10  B SL shuttle press 37.5# 3x10  20" sit<>stand 3x10  32" L SL w/dowel sit<>stand 3x10  B GTB clamshells 3x10 ea  B GTB hip abd bridges 3x10   B hip ext on edge of table 3x10     Not today             Supine butt winks 15x ea       B SL bridges 3x10/3"   Biofeedback 10' 5/10 pV 710 unable to perform - long pants                           Laura received the following manual therapy techniques: Joint mobilizations, Manual traction, Myofacial release, Manual Lymphatic Drainage and Soft tissue Mobilization were applied to the: L hip/knee  for " 10 minutes, including: patellar mobs, joint hinge mobs, Long axis distraction manipulation, joint capsular distraction      Laura received cold pack for 0 minutes to to decrease circulation, pain, and swelling.      Home Exercises Provided and Patient Education Provided     Education provided:   Compliance with HEP    Written Home Exercises Provided: yes.  Exercises were reviewed and Laura was able to demonstrate them prior to the end of the session.  Laura demonstrated good  understanding of the education provided.   Assessment   Pt tolerating tx well. Progressing well with stretching and strengthening hip, gluts and quads. VC/TC for correcting form/technique with therex. Pt showing improvement with isolating glut contraction with winks. Appropriated fatigue level after tx. Laura Is progressing well towards her goals.   Pt prognosis is Good.     Pt will continue to benefit from skilled outpatient physical therapy to address the deficits listed in the problem list box on initial evaluation, provide pt/family education and to maximize pt's level of independence in the home and community environment.     Pt's spiritual, cultural and educational needs considered and pt agreeable to plan of care and goals.    Anticipated barriers to physical therapy: none     Goals:   Goals:  Short Term Goals: (4 weeks)  1. Pt will be independent with HEP in order to supplement patient in improving functional mobility. - progressing, not met  2. Pt will improve (L) knee AROM to full and pain-free to demonstrate reduced ITB irritability- progressing, not met  3. Pt will improve hip flexor/quadriceps mobility to WNL in order to improve hip/knee AROM and reduce ITB tension - progressing, not met  4. Pt will be able to perform SL Squats full and pain-free to demonstrate improve motor control and SL stability. - progressing, not met     Long Term Goals: (12 weeks)  1. Pt will be independent with updated HEP supplement PT in improving functional  mobility. - progressing, not met  2. Pt will improve FOTO knee survey score to </= prediced % limited in order to demo improved functional mobility. - progressing, not met  3. Pt will return to running pain-free to demonstrate a return to PLOF - progressing, not met      Plan     Continue with POC     Teodoro Johnston, PTA, STS

## 2022-09-13 ENCOUNTER — CLINICAL SUPPORT (OUTPATIENT)
Dept: REHABILITATION | Facility: HOSPITAL | Age: 42
End: 2022-09-13
Payer: COMMERCIAL

## 2022-09-13 PROCEDURE — 97110 THERAPEUTIC EXERCISES: CPT | Mod: CQ

## 2022-09-13 NOTE — PROGRESS NOTES
"  Physical Therapy Daily Treatment Note     Name: Laura Fitzpatrick  Clinic Number: [id number]    Therapy Diagnosis:        Encounter Diagnoses   Name Primary?    It band syndrome, left      Myalgia      Impaired mobility and ADLs Yes      Physician: Camilla Gifford DO     Physician Orders: PT Eval and Treat  Medical Diagnosis from Referral:   M76.32 (ICD-10-CM) - It band syndrome, left   M79.10 (ICD-10-CM) - Myalgia   Evaluation Date: 8/23/2022  Authorization Period Expiration: 8/11/2023  Plan of Care Expiration: 11/23/2022  Visit # / Visits authorized: 4/ 20     Time In: 1455  Time Out: 1555  Total Appointment Time (timed & untimed codes): 60 minutes     Precautions: Standard     Subjective     Pt reports: min L knee soreness today  She was compliant with home exercise program.  Response to previous treatment: felt better   Functional change: no jogging     Pain: 2/10  Location: left knee      Objective     Laura received therapeutic exercises to develop strength, endurance, ROM, flexibility, posture and core stabilization for 55 minutes including:    Elliptical 5' Lv 2 and Bike 5' Lv 5 for increased ROM, strength, and endurance   L ITB stretch supine with strap 4x/30"  L HSS long sitting 3x/30"  L  heel cord stretch strap 3x/30"  B hip flexor stretch plinth 3x30" ea  B shuttle press 62.5# 3x10  B SL shuttle press 37.5# 3x10  20" sit<>stand 3x15  32" L SL w/dowel sit<>stand 3x10  B GTB clamshells 3x10 ea  B GTB hip abd bridges 3x10   B hip ext on edge of table 3x10     Not today             Supine butt winks 15x ea       B SL bridges 3x10/3"   Biofeedback 10' 5/10 pV 710 unable to perform - long pants                           Laura received the following manual therapy techniques: Joint mobilizations, Manual traction, Myofacial release, Manual Lymphatic Drainage and Soft tissue Mobilization were applied to the: L hip/knee  for 0 minutes, including: patellar mobs, joint hinge mobs, Long axis distraction " manipulation, joint capsular distraction      Laura received cold pack for 0 minutes to to decrease circulation, pain, and swelling.      Home Exercises Provided and Patient Education Provided     Education provided:   Compliance with HEP    Written Home Exercises Provided: yes.  Exercises were reviewed and Laura was able to demonstrate them prior to the end of the session.  Laura demonstrated good  understanding of the education provided.   Assessment   Pt tolerating tx well. Occasional lateral knee discomfort on elliptical. Progressing well with stretching and strengthening hip, gluts and quads. VC/TC for correcting form/technique with therex. Improved motor control with sit<>stand T/F. Appropriated fatigue level after tx. Laura Is progressing well towards her goals.   Pt prognosis is Good.     Pt will continue to benefit from skilled outpatient physical therapy to address the deficits listed in the problem list box on initial evaluation, provide pt/family education and to maximize pt's level of independence in the home and community environment.     Pt's spiritual, cultural and educational needs considered and pt agreeable to plan of care and goals.    Anticipated barriers to physical therapy: none     Goals:   Goals:  Short Term Goals: (4 weeks)  1. Pt will be independent with HEP in order to supplement patient in improving functional mobility. - progressing, not met  2. Pt will improve (L) knee AROM to full and pain-free to demonstrate reduced ITB irritability- progressing, not met  3. Pt will improve hip flexor/quadriceps mobility to WNL in order to improve hip/knee AROM and reduce ITB tension - progressing, not met  4. Pt will be able to perform SL Squats full and pain-free to demonstrate improve motor control and SL stability. - progressing, not met     Long Term Goals: (12 weeks)  1. Pt will be independent with updated HEP supplement PT in improving functional mobility. - progressing, not met  2. Pt will  improve FOTO knee survey score to </= prediced % limited in order to demo improved functional mobility. - progressing, not met  3. Pt will return to running pain-free to demonstrate a return to PLOF - progressing, not met      Plan     Continue with POC     Teodoro Johnston, PTA, STS

## 2022-09-18 ENCOUNTER — PATIENT MESSAGE (OUTPATIENT)
Dept: INTERNAL MEDICINE | Facility: CLINIC | Age: 42
End: 2022-09-18
Payer: COMMERCIAL

## 2022-09-19 NOTE — TELEPHONE ENCOUNTER
The patient had a referral to Psych ordered back in January of 2022.  I gave her the number to psych to call for appointment.

## 2022-09-22 ENCOUNTER — CLINICAL SUPPORT (OUTPATIENT)
Dept: REHABILITATION | Facility: HOSPITAL | Age: 42
End: 2022-09-22
Payer: COMMERCIAL

## 2022-09-22 DIAGNOSIS — M76.32 IT BAND SYNDROME, LEFT: Primary | ICD-10-CM

## 2022-09-22 PROCEDURE — 97110 THERAPEUTIC EXERCISES: CPT

## 2022-09-22 NOTE — PROGRESS NOTES
"  Physical Therapy Daily Treatment Note     Name: Laura Fitzpatrick  Clinic Number: [id number]    Therapy Diagnosis:        Encounter Diagnoses   Name Primary?    It band syndrome, left      Myalgia      Impaired mobility and ADLs Yes      Physician: Camilla Gifford DO     Physician Orders: PT Eval and Treat  Medical Diagnosis from Referral:   M76.32 (ICD-10-CM) - It band syndrome, left   M79.10 (ICD-10-CM) - Myalgia   Evaluation Date: 8/23/2022  Authorization Period Expiration: 8/11/2023  Plan of Care Expiration: 11/23/2022  Visit # / Visits authorized: 5/20     Time In: 400p  Time Out: 450p  Total Appointment Time (timed & untimed codes): 50 minutes     Precautions: Standard     Subjective     Pt reports: she has not been keeping up with her exercises over the past month due to her personal issues. She understands that she will not be able to run in her race in November but she will walk it.     She was compliant with home exercise program.  Response to previous treatment: felt better   Functional change: no jogging     Pain: 2/10  Location: left knee      Objective     Assessment:   Knee ROM: full and pain-free   DL Squat pain-free  SL Squat is painful; pain-free following glute isometrics  Hip Abd MMT 4-/5 B       Laura received therapeutic exercises to develop strength, endurance, ROM, flexibility, posture and core stabilization for 50 minutes including:     Assessment as above    SL Clams 5 x 45 sec holds no resistance B  Sl Hip Abd 3 x 30 sec  B   Seated LAQ 10# x 30 (90-45)  SL Hip ER 3# 3 x 10   B shuttle press 125# 3x10  B SL shuttle press 75# 3x10  B SL shuttle hip ext 25# 2 x 10 B    Elliptical 5' Lv 2 and Bike 5' Lv 5 for increased ROM, strength, and endurance        Not today:   L ITB stretch supine with strap 4x/30"  L HSS long sitting 3x/30"  L  heel cord stretch strap 3x/30"  B hip flexor stretch plinth 3x30" ea  20" sit<>stand 3x15  32" L SL w/dowel sit<>stand 3x10  B GTB clamshells 3x10 ea  B GTB " "hip abd bridges 3x10   B hip ext on edge of table 3x10             Supine butt winks 15x ea       B SL bridges 3x10/3"   Biofeedback 10' 5/10 pV 710 unable to perform - long pants                           Laura received the following manual therapy techniques: Joint mobilizations, Manual traction, Myofacial release, Manual Lymphatic Drainage and Soft tissue Mobilization were applied to the: L hip/knee  for 0 minutes, including: patellar mobs, joint hinge mobs, Long axis distraction manipulation, joint capsular distraction      Laura received cold pack for 0 minutes to to decrease circulation, pain, and swelling.      Home Exercises Provided and Patient Education Provided     Education provided:   Compliance with HEP    Written Home Exercises Provided: yes.  Exercises were reviewed and Laura was able to demonstrate them prior to the end of the session.  Laura demonstrated good  understanding of the education provided.   Assessment   Today we discussed expectations for treatment. She understands running her race in November is unrealistic due to lack of compliance and current pain status. She may be able to complete the race with partial periods of light jogging depending on future compliance and irritability. She needs to improve glute activation and motor control in SL stance. STG met for knee ROM. Following glute isometrics, she reported no pain with SL Squat. HEP updated. Pt demon good understanding of exercises. Pt was able to jog for 30sec pain-free following treatment. She was educated to jog 30sec; walk 5 minutes for a total of 20-30minutes.       Pt prognosis is Good.     Pt will continue to benefit from skilled outpatient physical therapy to address the deficits listed in the problem list box on initial evaluation, provide pt/family education and to maximize pt's level of independence in the home and community environment.     Pt's spiritual, cultural and educational needs considered and pt agreeable to plan of " care and goals.    Anticipated barriers to physical therapy: none     Goals:  Short Term Goals: (4 weeks)  1. Pt will be independent with HEP in order to supplement patient in improving functional mobility. - progressing, not met  2. Pt will improve (L) knee AROM to full and pain-free to demonstrate reduced ITB irritability- met  3. Pt will improve hip flexor/quadriceps mobility to WNL in order to improve hip/knee AROM and reduce ITB tension - progressing, not met  4. Pt will be able to perform SL Squats full and pain-free to demonstrate improve motor control and SL stability. - progressing, not met     Long Term Goals: (12 weeks)  1. Pt will be independent with updated HEP supplement PT in improving functional mobility. - progressing, not met  2. Pt will improve FOTO knee survey score to </= prediced % limited in order to demo improved functional mobility. - progressing, not met  3. Pt will return to running pain-free to demonstrate a return to PLOF - progressing, not met    Plan     Continue with POC     Jb Multani, PT

## 2022-09-27 ENCOUNTER — CLINICAL SUPPORT (OUTPATIENT)
Dept: REHABILITATION | Facility: HOSPITAL | Age: 42
End: 2022-09-27
Payer: COMMERCIAL

## 2022-09-27 DIAGNOSIS — M79.10 MYALGIA: ICD-10-CM

## 2022-09-27 DIAGNOSIS — M76.32 IT BAND SYNDROME, LEFT: ICD-10-CM

## 2022-09-27 DIAGNOSIS — Z74.09 IMPAIRED MOBILITY AND ADLS: Primary | ICD-10-CM

## 2022-09-27 DIAGNOSIS — Z78.9 IMPAIRED MOBILITY AND ADLS: Primary | ICD-10-CM

## 2022-09-27 PROCEDURE — 97110 THERAPEUTIC EXERCISES: CPT

## 2022-09-27 NOTE — PROGRESS NOTES
"  Physical Therapy Daily Treatment Note     Name: Laura Fitzpatrick  Clinic Number: [id number]    Therapy Diagnosis:        Encounter Diagnoses   Name Primary?    It band syndrome, left      Myalgia      Impaired mobility and ADLs Yes      Physician: Camilla Gifford DO     Physician Orders: PT Eval and Treat  Medical Diagnosis from Referral:   M76.32 (ICD-10-CM) - It band syndrome, left   M79.10 (ICD-10-CM) - Myalgia   Evaluation Date: 8/23/2022  Authorization Period Expiration: 8/11/2023  Plan of Care Expiration: 11/23/2022  Visit # / Visits authorized: 6/20     Time In: 300p  Time Out: 350p  Total Appointment Time (timed & untimed codes): 50 minutes     Precautions: Standard     Subjective     Pt reports: she did some light running since last treatment with no pain. She states she is feeling better.     She was compliant with home exercise program.  Response to previous treatment: felt better   Functional change: no jogging     Pain: 2/10  Location: left knee      Objective     Assessment:   Knee ROM: full and pain-free   DL Squat pain-free  SL Squat is painful; pain-free following glute isometrics  Hip Abd MMT 4-/5 B       Laura received therapeutic exercises to develop strength, endurance, ROM, flexibility, posture and core stabilization for 50 minutes including:     Assessment as above  Upright Bike 8min, Level 3    Sl Hip Abd 5 x fatigue   SL Bridge 3 x fatigue   SL Squat 3 x 8 B   Stanidng Steamboats on 2inch step 3 x 5 B   Split Squat 15# KB 3 x 8 B   Sled Push 45# 5 laps of 15 yards        Not today:  Seated LAQ 10# x 30 (90-45)  SL Hip ER 3# 3 x 10   B shuttle press 125# 3x10  B SL shuttle press 75# 3x10  B SL shuttle hip ext 25# 2 x 10 B    L ITB stretch supine with strap 4x/30"  L HSS long sitting 3x/30"  L  heel cord stretch strap 3x/30"  B hip flexor stretch plinth 3x30" ea  20" sit<>stand 3x15  32" L SL w/dowel sit<>stand 3x10  B GTB clamshells 3x10 ea  B GTB hip abd bridges 3x10   B hip ext on edge " "of table 3x10             Supine butt winks 15x ea       B SL bridges 3x10/3"   Biofeedback 10' 5/10 pV 710 unable to perform - long pants                           Laura received the following manual therapy techniques: Joint mobilizations, Manual traction, Myofacial release, Manual Lymphatic Drainage and Soft tissue Mobilization were applied to the: L hip/knee  for 0 minutes, including: patellar mobs, joint hinge mobs, Long axis distraction manipulation, joint capsular distraction      Laura received cold pack for 0 minutes to to decrease circulation, pain, and swelling.      Home Exercises Provided and Patient Education Provided     Education provided:   Compliance with HEP    Written Home Exercises Provided: yes.  Exercises were reviewed and Laura was able to demonstrate them prior to the end of the session.  Laura demonstrated good  understanding of the education provided.   Assessment   No pain today and she is back ti light jogging. Pt educated to continue to work on strength training at home 2-3days/week and to progress as tolerated. Pt educated to monitor symptoms in the days post exercises.       Pt prognosis is Good.     Pt will continue to benefit from skilled outpatient physical therapy to address the deficits listed in the problem list box on initial evaluation, provide pt/family education and to maximize pt's level of independence in the home and community environment.     Pt's spiritual, cultural and educational needs considered and pt agreeable to plan of care and goals.    Anticipated barriers to physical therapy: none     Goals:  Short Term Goals: (4 weeks)  1. Pt will be independent with HEP in order to supplement patient in improving functional mobility. - progressing, not met  2. Pt will improve (L) knee AROM to full and pain-free to demonstrate reduced ITB irritability- met  3. Pt will improve hip flexor/quadriceps mobility to WNL in order to improve hip/knee AROM and reduce ITB tension - " progressing, not met  4. Pt will be able to perform SL Squats full and pain-free to demonstrate improve motor control and SL stability. - progressing, not met     Long Term Goals: (12 weeks)  1. Pt will be independent with updated HEP supplement PT in improving functional mobility. - progressing, not met  2. Pt will improve FOTO knee survey score to </= prediced % limited in order to demo improved functional mobility. - progressing, not met  3. Pt will return to running pain-free to demonstrate a return to PLOF - progressing, not met    Plan     Continue with POC     Jb Multani, PT

## 2022-10-04 ENCOUNTER — CLINICAL SUPPORT (OUTPATIENT)
Dept: REHABILITATION | Facility: HOSPITAL | Age: 42
End: 2022-10-04
Payer: COMMERCIAL

## 2022-10-04 PROCEDURE — 97110 THERAPEUTIC EXERCISES: CPT

## 2022-10-04 PROCEDURE — 97140 MANUAL THERAPY 1/> REGIONS: CPT

## 2022-10-04 NOTE — PROGRESS NOTES
Physical Therapy Daily Treatment Note     Name: Laura Fitzpatrick  Clinic Number: [id number]    Therapy Diagnosis:        Encounter Diagnoses   Name Primary?    It band syndrome, left      Myalgia      Impaired mobility and ADLs Yes      Physician: Camilla Gifford DO     Physician Orders: PT Eval and Treat  Medical Diagnosis from Referral:   M76.32 (ICD-10-CM) - It band syndrome, left   M79.10 (ICD-10-CM) - Myalgia   Evaluation Date: 8/23/2022  Authorization Period Expiration: 8/11/2023  Plan of Care Expiration: 11/23/2022  Visit # / Visits authorized: 8/20     Time In: 300pm  Time Out: 400pm  Total Appointment Time (timed & untimed codes): 60 minutes     Precautions: Standard     Subjective     Pt reports: running on Saturday without too much pain, but she also ran this morning and has pain even now in the afternoon. She has only been jogging for short bouts and walking the rest of about two miles. She still wants to participate in the [employer] Run at the beginning of November that consists of a 5k, 10k, and half marathon.    She was compliant with home exercise program.  Response to previous treatment: felt better   Functional change: no jogging     Pain: 2/10  Location: left knee      Objective     Assessment done on 10/4/2022:   Quad strength good   Left hip abduction 4-/5   Right hip abduction 4+/5   Hamstring weakness Bilaterally   Pain with single leg squat on the left with less range than the right   17/20 heel raises completed in a row on the right, 8/20 on the left    Laura received therapeutic exercises to develop strength, endurance, ROM, flexibility, posture and core stabilization for 50 minutes including:     Assessment as above   Double leg glute bridges x 15   Double leg glute bridges with hamstring bias x 14   Standing single leg   Shuttle:    Single leg x 45    Heel raises x 30    Side Lying single leg in flexion and abduction x 30 Bilateral    Wall walks with yellow band around ankles for hip  "strength and feet for eversion strength, on heels for tibialis anterior strength, two laps down and back.     Not today:  Seated LAQ 10# x 30 (90-45)  SL Hip ER 3# 3 x 10   B shuttle press 125# 3x10  B SL shuttle press 75# 3x10  B SL shuttle hip ext 25# 2 x 10 B    L ITB stretch supine with strap 4x/30"  L HSS long sitting 3x/30"  L  heel cord stretch strap 3x/30"  B hip flexor stretch plinth 3x30" ea  20" sit<>stand 3x15  32" L SL w/dowel sit<>stand 3x10  B GTB clamshells 3x10 ea  B GTB hip abd bridges 3x10   B hip ext on edge of table 3x10             Supine butt winks 15x ea       B SL bridges 3x10/3"   Biofeedback 10' 5/10 pV 710 unable to perform - long pants                           Laura received the following manual therapy techniques: Joint mobilizations, Manual traction, Myofacial release, Manual Lymphatic Drainage and Soft tissue Mobilization were applied to the: L hip/knee  for 10 minutes, including:     Patellar mobilizations  Long axis distraction manipulation  Joint capsular distraction    Not today:  Joint hinge mobilizations      Laura received cold pack for 0 minutes to to decrease circulation, pain, and swelling.      Home Exercises Provided and Patient Education Provided     Education provided:   Compliance with HEP    Written Home Exercises Provided: yes.  Exercises were reviewed and Laura was able to demonstrate them prior to the end of the session.  Laura demonstrated good  understanding of the education provided.   Assessment   Patient still having pain with or after jogging that persists throughout the day. She still wants to participate in the run in November but realizes she may be walking most if not all of it. Continue to work on glute, hamstring, and calf strength and progress to return to run.      Pt prognosis is Good.     Pt will continue to benefit from skilled outpatient physical therapy to address the deficits listed in the problem list box on initial evaluation, provide pt/family " education and to maximize pt's level of independence in the home and community environment.     Pt's spiritual, cultural and educational needs considered and pt agreeable to plan of care and goals.    Anticipated barriers to physical therapy: none     Goals:  Short Term Goals: (4 weeks)  1. Pt will be independent with HEP in order to supplement patient in improving functional mobility. - progressing, not met  2. Pt will improve (L) knee AROM to full and pain-free to demonstrate reduced ITB irritability- met  3. Pt will improve hip flexor/quadriceps mobility to WNL in order to improve hip/knee AROM and reduce ITB tension - progressing, not met  4. Pt will be able to perform SL Squats full and pain-free to demonstrate improve motor control and SL stability. - progressing, not met     Long Term Goals: (12 weeks)  1. Pt will be independent with updated HEP supplement PT in improving functional mobility. - progressing, not met  2. Pt will improve FOTO knee survey score to </= prediced % limited in order to demo improved functional mobility. - progressing, not met  3. Pt will return to running pain-free to demonstrate a return to PLOF - progressing, not met    Plan     Continue with POC     Deisi Kuhn, PT, DPT

## 2022-10-14 ENCOUNTER — CLINICAL SUPPORT (OUTPATIENT)
Dept: REHABILITATION | Facility: HOSPITAL | Age: 42
End: 2022-10-14
Payer: COMMERCIAL

## 2022-10-14 PROCEDURE — 97110 THERAPEUTIC EXERCISES: CPT | Mod: CQ

## 2022-10-14 PROCEDURE — 97140 MANUAL THERAPY 1/> REGIONS: CPT | Mod: CQ

## 2022-10-14 NOTE — PROGRESS NOTES
"  Physical Therapy Daily Treatment Note     Name: Laura Fitzpatrick  Clinic Number: [id number]    Therapy Diagnosis:        Encounter Diagnoses   Name Primary?    It band syndrome, left      Myalgia      Impaired mobility and ADLs Yes      Physician: Camilla Gifford DO     Physician Orders: PT Eval and Treat  Medical Diagnosis from Referral:   M76.32 (ICD-10-CM) - It band syndrome, left   M79.10 (ICD-10-CM) - Myalgia   Evaluation Date: 8/23/2022  Authorization Period Expiration: 8/11/2023  Plan of Care Expiration: 11/23/2022  Visit # / Visits authorized: 9/20     Time In: 1455  Time Out: 1555  Total Appointment Time (timed & untimed codes): 60 minutes     Precautions: Standard     Subjective     Pt reports: stated that " I have not been here in a while, and I believe this is my last time coming because this is not working".   She was compliant with home exercise program.  Response to previous treatment: felt better   Functional change: no jogging     Pain: 0/10  Location: left knee      Objective     Laura received therapeutic exercises to develop strength, endurance, ROM, flexibility, posture and core stabilization for 40 minutes including:    Stationary bike for 10' for increased ROM, circulation, mm strength/endurance   Wall Mini squats heel raises 2x10/3"  L shld heel raises 3x10/3"  L LE lateral step ups 6" 3x10  L HSS long sitting 4x/30"  L HSS long sitting 4x/30"  L knee ext isometric 40-60' w/strap 30x/5"  B shuttle press 125# 3x10  B SL shuttle press 75# 3x10  B SL shuttle hip ext 25# 2x10 B    SL Hip ER 3# 3 x 10     B GTB clamshells 3x10 ea                      Laura received the following manual therapy techniques: Joint mobilizations, Manual traction, Myofacial release, Manual Lymphatic Drainage and Soft tissue Mobilization were applied to the: L hip/knee  for 10 minutes, including:     Patellar mobilizations  Joint capsular distraction  Joint hinge mobilizations  PROM      Laura received cold pack for 0 " minutes to to decrease circulation, pain, and swelling.      Home Exercises Provided and Patient Education Provided     Education provided:   Compliance with HEP    Written Home Exercises Provided: yes.  Exercises were reviewed and Laura was able to demonstrate them prior to the end of the session.  Laura demonstrated good  understanding of the education provided.   Assessment   Pt tolerating tx well. No increased L knee pain symptoms during tx. Continue to work on glute, hamstring, and calf strength and progress to return to run per Supervising PT. VC/TC for correcting form/technique with therex. Compliant with no jogging since last visit.     Pt prognosis is Good.     Pt will continue to benefit from skilled outpatient physical therapy to address the deficits listed in the problem list box on initial evaluation, provide pt/family education and to maximize pt's level of independence in the home and community environment.     Pt's spiritual, cultural and educational needs considered and pt agreeable to plan of care and goals.    Anticipated barriers to physical therapy: none     Goals:  Short Term Goals: (4 weeks)  1. Pt will be independent with HEP in order to supplement patient in improving functional mobility. - progressing, not met  2. Pt will improve (L) knee AROM to full and pain-free to demonstrate reduced ITB irritability- met  3. Pt will improve hip flexor/quadriceps mobility to WNL in order to improve hip/knee AROM and reduce ITB tension - progressing, not met  4. Pt will be able to perform SL Squats full and pain-free to demonstrate improve motor control and SL stability. - progressing, not met     Long Term Goals: (12 weeks)  1. Pt will be independent with updated HEP supplement PT in improving functional mobility. - progressing, not met  2. Pt will improve FOTO knee survey score to </= prediced % limited in order to demo improved functional mobility. - progressing, not met  3. Pt will return to running  pain-free to demonstrate a return to PLOF - progressing, not met    Plan     Continue with POC     Teodoro Johnston, PTA, STS

## 2022-10-18 ENCOUNTER — OFFICE VISIT (OUTPATIENT)
Dept: PSYCHIATRY | Facility: CLINIC | Age: 42
End: 2022-10-18
Payer: COMMERCIAL

## 2022-10-18 DIAGNOSIS — F41.0 GENERALIZED ANXIETY DISORDER WITH PANIC ATTACKS: ICD-10-CM

## 2022-10-18 DIAGNOSIS — F41.1 GENERALIZED ANXIETY DISORDER WITH PANIC ATTACKS: ICD-10-CM

## 2022-10-18 DIAGNOSIS — F43.21 GRIEF: ICD-10-CM

## 2022-10-18 DIAGNOSIS — F33.1 MDD (MAJOR DEPRESSIVE DISORDER), RECURRENT EPISODE, MODERATE: Primary | ICD-10-CM

## 2022-10-18 PROCEDURE — 90791 PSYCH DIAGNOSTIC EVALUATION: CPT | Mod: S$GLB,,, | Performed by: PSYCHOLOGIST

## 2022-10-18 PROCEDURE — 90791 PR PSYCHIATRIC DIAGNOSTIC EVALUATION: ICD-10-PCS | Mod: S$GLB,,, | Performed by: PSYCHOLOGIST

## 2022-10-18 NOTE — PROGRESS NOTES
Psychiatry Initial Visit (PhD/LCSW)  Diagnostic Interview - CPT 97812    Date: 10/18/2022    Site: [facility]    Referral source: self    Clinical status of patient: Outpatient    Laura Fitzpatrick, a 42 y.o. female, for initial evaluation visit.  Met with patient.    Chief complaint/reason for encounter: depression and anxiety    History of present illness: Patient reported history of depression and anxiety since adolescence.  She noted that her relationship with her partner ended about six weeks ago which has increased her symptoms.  Symptoms include excessive anxiety/worry, restlessness/keyed up, irritability, muscle tension, panic attacks, depressed mood, diminished interest, weight gain, insomnia, fatigue, worthlessness/guilt, poor concentration, tearfulness, and social isolation.  She noted that she normally uses running as a coping mechanism, but is unable to do so right now.  Patient reported that she was hospitalized for depression and anorexia at age 16 years old.  She denied current disordered eating.  Patient denied history of self-harm behaviors.  Patient denied current suicidal and homicidal ideations.     Pain: noncontributory    Symptoms:   Mood: depressed mood, diminished interest, weight gain, insomnia, fatigue, worthlessness/guilt, poor concentration, tearfulness, and social isolation  Anxiety: excessive anxiety/worry, restlessness/keyed up, irritability, muscle tension, and panic attacks  Substance abuse: denied  Cognitive functioning: denied  Health behaviors: noncontributory    Psychiatric history: prior inpatient treatment, psychotropic management by PCP, and has participated in counseling/psychotherapy on an outpatient basis in the past - for depression at age 16 years old    Medical history: none    Family history of psychiatric illness: Mother - depression, anxiety, suicide attempts; brother - anxiety, ADHD    Social history (marriage, employment, etc.): Patient reported growing up  "in [place], [place] living with her parents until they  when patient was 17 years old.  She noted that her mother went to the [facility] and patient lived with her father after that point.  Patient is the oldest of three children.  She noted that she has three stepsisters.  Patient reported that she has taken on the role of the oldest child as she is the caregiver for all.  She reports having close relationships with her identical twin sister and younger brother.   Patient reported experiencing trauma around her "mother's sudden trips to the hospital," witnessed verbal altercations between her parents, and her mother's anger.  Patient reported having a distant and sometimes strained relationship with her mother.  She reports having a good relationship with her father.  Her highest level of education is a Masters degree in Communication Disorders. She currently works as a Speech Therapist. She has never been  and has no children.    Substance use:   Alcohol: two mixed drinks once per week   Drugs: none   Tobacco: none   Caffeine: Remy - two packets daily, Sodas/Tea - occasional    Current medications and drug reactions (include OTC, herbal): see medication list     Strengths and liabilities: Strength: Patient is expressive/articulate., Liability: Patient lacks coping skills.    Current Evaluation:     Mental Status Exam:  General Appearance:  unremarkable, age appropriate   Speech: normal tone, normal rate, normal pitch, normal volume      Level of Cooperation: cooperative      Thought Processes: normal and logical   Mood: anxious, depressed      Thought Content: normal, no suicidality, no homicidality, delusions, or paranoia   Affect: congruent and appropriate   Orientation: Oriented x3   Memory: recent >  words after brief delay: 2 of 3 words, remote >  intact   Attention Span & Concentration: completed serial 7s adequately   Fund of General Knowledge: intact and appropriate to age and level of " education   Judgment & Insight: fair     Language  intact     GEOVANNA-7 Score: (P) 10  Interpretation: (P) Moderate Anxiety     PHQ8 Score : (P) 12  PHQ8 Interpretation: (P) Moderate    Diagnostic Impression - Plan:       ICD-10-CM ICD-9-CM   1. MDD (major depressive disorder), recurrent episode, moderate  F33.1 296.32   2. Generalized anxiety disorder with panic attacks  F41.1 300.02    F41.0 300.01   3. Grief  F43.21 309.0       Plan:individual psychotherapy, consult psychiatrist for medication evaluation, and medication management by physician    Return to Clinic: 2 weeks    Length of Service (minutes): 60

## 2022-10-29 ENCOUNTER — OFFICE VISIT (OUTPATIENT)
Dept: URGENT CARE | Facility: CLINIC | Age: 42
End: 2022-10-29
Payer: COMMERCIAL

## 2022-10-29 VITALS
TEMPERATURE: 98 F | RESPIRATION RATE: 17 BRPM | DIASTOLIC BLOOD PRESSURE: 73 MMHG | WEIGHT: 175 LBS | SYSTOLIC BLOOD PRESSURE: 104 MMHG | BODY MASS INDEX: 33.04 KG/M2 | HEIGHT: 61 IN | OXYGEN SATURATION: 97 % | HEART RATE: 82 BPM

## 2022-10-29 DIAGNOSIS — J10.1 INFLUENZA A: Primary | ICD-10-CM

## 2022-10-29 DIAGNOSIS — R05.9 COUGH, UNSPECIFIED TYPE: ICD-10-CM

## 2022-10-29 LAB
CTP QC/QA: YES
CTP QC/QA: YES
POC MOLECULAR INFLUENZA A AGN: POSITIVE
POC MOLECULAR INFLUENZA B AGN: NEGATIVE
SARS-COV-2 RDRP RESP QL NAA+PROBE: NEGATIVE

## 2022-10-29 PROCEDURE — 3078F PR MOST RECENT DIASTOLIC BLOOD PRESSURE < 80 MM HG: ICD-10-PCS | Mod: CPTII,S$GLB,, | Performed by: NURSE PRACTITIONER

## 2022-10-29 PROCEDURE — 3074F PR MOST RECENT SYSTOLIC BLOOD PRESSURE < 130 MM HG: ICD-10-PCS | Mod: CPTII,S$GLB,, | Performed by: NURSE PRACTITIONER

## 2022-10-29 PROCEDURE — 1159F PR MEDICATION LIST DOCUMENTED IN MEDICAL RECORD: ICD-10-PCS | Mod: CPTII,S$GLB,, | Performed by: NURSE PRACTITIONER

## 2022-10-29 PROCEDURE — 87635: ICD-10-PCS | Mod: QW,S$GLB,, | Performed by: NURSE PRACTITIONER

## 2022-10-29 PROCEDURE — 1159F MED LIST DOCD IN RCRD: CPT | Mod: CPTII,S$GLB,, | Performed by: NURSE PRACTITIONER

## 2022-10-29 PROCEDURE — 99214 PR OFFICE/OUTPT VISIT, EST, LEVL IV, 30-39 MIN: ICD-10-PCS | Mod: S$GLB,,, | Performed by: NURSE PRACTITIONER

## 2022-10-29 PROCEDURE — 87502 POCT INFLUENZA A/B MOLECULAR: ICD-10-PCS | Mod: QW,S$GLB,, | Performed by: NURSE PRACTITIONER

## 2022-10-29 PROCEDURE — 3078F DIAST BP <80 MM HG: CPT | Mod: CPTII,S$GLB,, | Performed by: NURSE PRACTITIONER

## 2022-10-29 PROCEDURE — 99214 OFFICE O/P EST MOD 30 MIN: CPT | Mod: S$GLB,,, | Performed by: NURSE PRACTITIONER

## 2022-10-29 PROCEDURE — 87502 INFLUENZA DNA AMP PROBE: CPT | Mod: QW,S$GLB,, | Performed by: NURSE PRACTITIONER

## 2022-10-29 PROCEDURE — 87635 SARS-COV-2 COVID-19 AMP PRB: CPT | Mod: QW,S$GLB,, | Performed by: NURSE PRACTITIONER

## 2022-10-29 PROCEDURE — 3074F SYST BP LT 130 MM HG: CPT | Mod: CPTII,S$GLB,, | Performed by: NURSE PRACTITIONER

## 2022-10-29 PROCEDURE — 1160F RVW MEDS BY RX/DR IN RCRD: CPT | Mod: CPTII,S$GLB,, | Performed by: NURSE PRACTITIONER

## 2022-10-29 PROCEDURE — 1160F PR REVIEW ALL MEDS BY PRESCRIBER/CLIN PHARMACIST DOCUMENTED: ICD-10-PCS | Mod: CPTII,S$GLB,, | Performed by: NURSE PRACTITIONER

## 2022-10-29 RX ORDER — OSELTAMIVIR PHOSPHATE 75 MG/1
75 CAPSULE ORAL 2 TIMES DAILY
Qty: 10 CAPSULE | Refills: 0 | Status: SHIPPED | OUTPATIENT
Start: 2022-10-29 | End: 2022-11-03

## 2022-10-29 NOTE — PROGRESS NOTES
"Subjective:       Patient ID: Laura Fitzpatrick is a 42 y.o. female.    Vitals:  height is 5' 1" (1.549 m) and weight is 79.4 kg (175 lb). Her temperature is 98.2 °F (36.8 °C). Her blood pressure is 104/73 and her pulse is 82. Her respiration is 17 and oxygen saturation is 97%.     Chief Complaint: Fever (Cough and congestion with fever - Entered by patient)    This is a 42 y.o. female who presents today with a chief complaint of fever onset last night. Pt states she has sore throat, cough, congestion and headaches since Thursday. Fever was 102 this morning. Pt was exposed to flu. Pt took OTC sinus medicine with little relief.    Fever   The current episode started yesterday. The problem has been rapidly worsening. The maximum temperature noted was 102 to 102.9 F. The temperature was taken using an oral thermometer. Associated symptoms include congestion, coughing, headaches, sleepiness and a sore throat. Pertinent negatives include no abdominal pain, muscle aches, nausea or vomiting. Treatments tried: otc sinus med.   Risk factors: sick contacts      Constitution: Positive for fever.   HENT:  Positive for congestion and sore throat.    Respiratory:  Positive for cough.    Gastrointestinal:  Negative for abdominal pain, nausea and vomiting.   Neurological:  Positive for headaches.     Objective:      Physical Exam   Constitutional: She appears ill.   HENT:   Head: Normocephalic.   Ears:   Right Ear: External ear normal.   Left Ear: External ear normal.   Abdominal: Normal appearance.   Psychiatric: Her affect is angry. She is agitated and aggressive.   Nursing note and vitals reviewed.      Results for orders placed or performed in visit on 10/29/22   POCT COVID-19 Rapid Screening   Result Value Ref Range    POC Rapid COVID Negative Negative     Acceptable Yes    POCT Influenza A/B MOLECULAR   Result Value Ref Range    POC Molecular Influenza A Ag Positive (A) Negative, Not Reported    POC Molecular " "Influenza B Ag Negative Negative, Not Reported     Acceptable Yes       Assessment:       1. Influenza A    2. Cough, unspecified type          Plan:       In discussing patient positive flu she states "they already told me that"   Patient did not wish to talk much during exam, she did seem very angry  Influenza A  -     oseltamivir (TAMIFLU) 75 MG capsule; Take 1 capsule (75 mg total) by mouth 2 (two) times daily. for 5 days  Dispense: 10 capsule; Refill: 0    Cough, unspecified type  -     POCT COVID-19 Rapid Screening  -     POCT Influenza A/B MOLECULAR               Patient Instructions       FLU  Your Rapid FLU test was POSITIVE FOR INFLUENZA A  If your condition worsens or fails to improve we recommend that you receive another evaluation at the ER immediately or contact your PCP to discuss your concerns or return here. You must understand that you've received an urgent care treatment only and that you may be released before all your medical problems are known or treated. You the patient will arrange for follouwp care as instructed.   -  Take full course of Tamilfu as prescribed  -  Flonase (fluticasone) is a nasal spray which is available over the counter and may help with your symptoms.   -  Zyrtec D, Claritin D or Allegra D can also help with symptoms of congestion and drainage.   -  If you just have drainage you can take plain Zyrtec, Claritin or Allegra   -  Rest and fluids are also important.   -  Tylenol or ibuprofen can also be used as directed for pain unless you have an allergy to them or medical condition such as stomach ulcers, kidney or liver disease or blood thinners etc for which you should not be taking these type of medications.   - Do not share any utensils or share drinks   - Wash hands frequently        "

## 2022-11-01 ENCOUNTER — OFFICE VISIT (OUTPATIENT)
Dept: PSYCHIATRY | Facility: CLINIC | Age: 42
End: 2022-11-01
Payer: COMMERCIAL

## 2022-11-01 ENCOUNTER — PATIENT MESSAGE (OUTPATIENT)
Dept: PSYCHIATRY | Facility: CLINIC | Age: 42
End: 2022-11-01
Payer: COMMERCIAL

## 2022-11-01 DIAGNOSIS — F33.1 MDD (MAJOR DEPRESSIVE DISORDER), RECURRENT EPISODE, MODERATE: Primary | ICD-10-CM

## 2022-11-01 DIAGNOSIS — F41.1 GENERALIZED ANXIETY DISORDER WITH PANIC ATTACKS: ICD-10-CM

## 2022-11-01 DIAGNOSIS — F43.21 GRIEF: ICD-10-CM

## 2022-11-01 DIAGNOSIS — F41.0 GENERALIZED ANXIETY DISORDER WITH PANIC ATTACKS: ICD-10-CM

## 2022-11-01 PROCEDURE — 90834 PR PSYCHOTHERAPY W/PATIENT, 45 MIN: ICD-10-PCS | Mod: 95,S$GLB,, | Performed by: PSYCHOLOGIST

## 2022-11-01 PROCEDURE — 90834 PSYTX W PT 45 MINUTES: CPT | Mod: 95,S$GLB,, | Performed by: PSYCHOLOGIST

## 2022-11-01 PROCEDURE — 99999 PR PBB SHADOW E&M-EST. PATIENT-LVL I: ICD-10-PCS | Mod: PBBFAC,,, | Performed by: PSYCHOLOGIST

## 2022-11-01 PROCEDURE — 99999 PR PBB SHADOW E&M-EST. PATIENT-LVL I: CPT | Mod: PBBFAC,,, | Performed by: PSYCHOLOGIST

## 2022-11-01 NOTE — PROGRESS NOTES
Individual Psychotherapy (PhD/LCSW)    11/1/2022    Therapeutic Intervention: Met with patient.  Outpatient - Behavior modifying psychotherapy 45 min - CPT code 71479    The patient location is: Patient's home/ Patient reported that her location at the time of this visit was in the [facility]     Visit type: Virtual visit with synchronous audio and video     Each patient to whom he or she provides medical services by telemedicine is: (1) informed of the relationship between the physician and patient and the respective role of any other health care provider with respect to management of the patient; and (2) notified that he or she may decline to receive medical services by telemedicine and may withdraw from such care at any time.     Chief complaint/reason for encounter: depression and anxiety     Interval history and content of current session: Patient presented casually dressed, alert, and oriented.  Patient reported experiencing excessive anxiety/worry, restlessness/keyed up, irritability, muscle tension, panic attacks, depressed mood, diminished interest, weight gain, insomnia, fatigue, worthlessness/guilt, poor concentration, tearfulness, and social isolation.  Patient denied current suicidal and homicidal ideations.  Explored triggers to patient's anxiety.  Active listening skills were used as patient described increased work related stress around her being out of work sick, which caused increased irritability.  Patient was taught relaxation skills including progressive muscle relaxation, guided imagery, and slow diaphragmatic breathing.  Patient was provided with a relaxation skills handout with exercises to practice at home.      Treatment plan:  Target symptoms: depression, anxiety , grief  Why chosen therapy is appropriate versus another modality: relevant to diagnosis  Outcome monitoring methods: self-report  Therapeutic intervention type: insight oriented psychotherapy, behavior modifying  psychotherapy    Risk parameters:  Patient reports no suicidal ideation  Patient reports no homicidal ideation  Patient reports no self-injurious behavior  Patient reports no violent behavior    Verbal deficits: None    Patient's response to intervention:  The patient's response to intervention is accepting.    Progress toward goals and other mental status changes:  The patient's progress toward goals is fair .    Diagnosis:     ICD-10-CM ICD-9-CM   1. MDD (major depressive disorder), recurrent episode, moderate  F33.1 296.32   2. Generalized anxiety disorder with panic attacks  F41.1 300.02    F41.0 300.01   3. Grief  F43.21 309.0       Plan:  individual psychotherapy, consult psychiatrist for medication evaluation, and medication management by physician    Return to clinic: 2 weeks    Length of Service (minutes): 45

## 2022-11-03 ENCOUNTER — PATIENT MESSAGE (OUTPATIENT)
Dept: PSYCHIATRY | Facility: CLINIC | Age: 42
End: 2022-11-03
Payer: COMMERCIAL

## 2022-11-10 ENCOUNTER — OFFICE VISIT (OUTPATIENT)
Dept: PSYCHIATRY | Facility: CLINIC | Age: 42
End: 2022-11-10
Payer: COMMERCIAL

## 2022-11-10 VITALS
DIASTOLIC BLOOD PRESSURE: 67 MMHG | HEART RATE: 87 BPM | SYSTOLIC BLOOD PRESSURE: 113 MMHG | BODY MASS INDEX: 35.21 KG/M2 | WEIGHT: 186.5 LBS | HEIGHT: 61 IN

## 2022-11-10 DIAGNOSIS — F33.1 MDD (MAJOR DEPRESSIVE DISORDER), RECURRENT EPISODE, MODERATE: ICD-10-CM

## 2022-11-10 DIAGNOSIS — F41.1 GENERALIZED ANXIETY DISORDER WITH PANIC ATTACKS: Primary | ICD-10-CM

## 2022-11-10 DIAGNOSIS — F41.0 GENERALIZED ANXIETY DISORDER WITH PANIC ATTACKS: Primary | ICD-10-CM

## 2022-11-10 DIAGNOSIS — F51.04 PSYCHOPHYSIOLOGICAL INSOMNIA: ICD-10-CM

## 2022-11-10 PROCEDURE — 99999 PR PBB SHADOW E&M-EST. PATIENT-LVL III: ICD-10-PCS | Mod: PBBFAC,,, | Performed by: NURSE PRACTITIONER

## 2022-11-10 PROCEDURE — 90792 PR PSYCHIATRIC DIAGNOSTIC EVALUATION W/MEDICAL SERVICES: ICD-10-PCS | Mod: S$GLB,,, | Performed by: NURSE PRACTITIONER

## 2022-11-10 PROCEDURE — 3074F PR MOST RECENT SYSTOLIC BLOOD PRESSURE < 130 MM HG: ICD-10-PCS | Mod: CPTII,S$GLB,, | Performed by: NURSE PRACTITIONER

## 2022-11-10 PROCEDURE — 3078F DIAST BP <80 MM HG: CPT | Mod: CPTII,S$GLB,, | Performed by: NURSE PRACTITIONER

## 2022-11-10 PROCEDURE — 1159F PR MEDICATION LIST DOCUMENTED IN MEDICAL RECORD: ICD-10-PCS | Mod: CPTII,S$GLB,, | Performed by: NURSE PRACTITIONER

## 2022-11-10 PROCEDURE — 3078F PR MOST RECENT DIASTOLIC BLOOD PRESSURE < 80 MM HG: ICD-10-PCS | Mod: CPTII,S$GLB,, | Performed by: NURSE PRACTITIONER

## 2022-11-10 PROCEDURE — 3008F BODY MASS INDEX DOCD: CPT | Mod: CPTII,S$GLB,, | Performed by: NURSE PRACTITIONER

## 2022-11-10 PROCEDURE — 99999 PR PBB SHADOW E&M-EST. PATIENT-LVL III: CPT | Mod: PBBFAC,,, | Performed by: NURSE PRACTITIONER

## 2022-11-10 PROCEDURE — 1160F RVW MEDS BY RX/DR IN RCRD: CPT | Mod: CPTII,S$GLB,, | Performed by: NURSE PRACTITIONER

## 2022-11-10 PROCEDURE — 90792 PSYCH DIAG EVAL W/MED SRVCS: CPT | Mod: S$GLB,,, | Performed by: NURSE PRACTITIONER

## 2022-11-10 PROCEDURE — 3008F PR BODY MASS INDEX (BMI) DOCUMENTED: ICD-10-PCS | Mod: CPTII,S$GLB,, | Performed by: NURSE PRACTITIONER

## 2022-11-10 PROCEDURE — 1160F PR REVIEW ALL MEDS BY PRESCRIBER/CLIN PHARMACIST DOCUMENTED: ICD-10-PCS | Mod: CPTII,S$GLB,, | Performed by: NURSE PRACTITIONER

## 2022-11-10 PROCEDURE — 3074F SYST BP LT 130 MM HG: CPT | Mod: CPTII,S$GLB,, | Performed by: NURSE PRACTITIONER

## 2022-11-10 PROCEDURE — 1159F MED LIST DOCD IN RCRD: CPT | Mod: CPTII,S$GLB,, | Performed by: NURSE PRACTITIONER

## 2022-11-10 RX ORDER — VENLAFAXINE HYDROCHLORIDE 37.5 MG/1
CAPSULE, EXTENDED RELEASE ORAL
Qty: 50 CAPSULE | Refills: 0 | Status: SHIPPED | OUTPATIENT
Start: 2022-11-10 | End: 2022-12-14

## 2022-11-10 RX ORDER — DULOXETIN HYDROCHLORIDE 20 MG/1
CAPSULE, DELAYED RELEASE ORAL
Qty: 30 CAPSULE | Refills: 0 | Status: SHIPPED | OUTPATIENT
Start: 2022-11-10 | End: 2022-12-14

## 2022-11-10 NOTE — PROGRESS NOTES
Outpatient Psychiatry Initial Visit (MD/NP)    11/10/2022   Laura Fitzpatrick  1980  [id number]        Laura Fitzpatrick, a 42 y.o. female, presenting for initial evaluation visit. Met with patient.    Reason for Encounter: Referral from C. Gillies, MD . Patient complains of depression and anxiety.      History of Present Illness:     Pt is here for evaluation of depression and anxiety. Pt endorses depressed mood, anhedonia, decreased energy and motivation, crying spells, low self-esteem, problems with sleep initiation, poor concentration, and fatigue. Denies hopelessness. Also endorses anxiety including nervousness, uncontrolled worry, inability to relax, restlessness, irritability, and muscle tension. She endorses episodes of heightened anxiety where her heart races and feels like she can't cath her breath. These occur a few times a week and last about 5-10 minutes. Onset of symptoms was approximately in adolescence. Symptoms have been waxing and waning since that time. Family history significant for anxiety and depression. Her mother has depression, anxiety and DID. Possible organic causes contributing are: none.  Complains of the following medication side effects: none. PCP has been managing her AD medication. Feels like the Cymbalta was effective at the beginning but is no longer effective.    States she has a hx of anorexia when she was a teen but denies any current disordered eating.     She broke up with her boyfriend in August after she found out he was cheating on her. This has been very tough on the pt. Her sxs of depression and anxiety have been exacerbated by this.    She enjoys running and going to the gym. She states her diet is poor. She has been eating a lot of fast food because she states it is comfort food. She knows she needs to return to the gym and start pre-prepping her meals. She plans to start doing these things. She states that she usually sleeps much better when she works  out.    Denies AVH, delusions, paranoia, or sxs of kacie or hypomania.    Pt denies recurrent thoughts of death and denies any suicidal or homicidal plans or intentions.      No objective s/sx of psychosis or kacie.     Discussed risks, benefits and SE profile of medication options.       Standardized Screenings tools:   PHQ9:  11 moderate   GEOVANNA- 7:  17 severe        Risk Parameters:  Patient reports no suicidal ideation  Patient reports no homicidal ideation  Patient reports no self-injurious behavior  Patient reports no violent behavior      Psychotropic medication review  Previous Trials-  Wellbutrin - severe anxiety  Zoloft  Melatonin - weird dreams    Current meds-  Cymbalta          History:       Past Medical, Psychiatric, Family and Social History: The patient's past medical, psychiatric, family and social history, allergies, current medications, past surgical history, and problem list have been reviewed and updated as appropriate within the electronic medical record.        Additional historical information includes:   Seizure: denies  Head trauma/TBI: denies  Access to a firearm: denies    Current Outpatient Medications   Medication Sig Dispense Refill    azelastine (ASTELIN) 137 mcg (0.1 %) nasal spray 1 spray (137 mcg total) by Nasal route 2 (two) times daily. (Patient not taking: Reported on 10/29/2022) 30 mL 1    DULoxetine (CYMBALTA) 60 MG capsule Take 1 capsule (60 mg total) by mouth once daily. 90 capsule 3    multivitamin (THERAGRAN) per tablet Take 1 tablet by mouth once daily.       No current facility-administered medications for this visit.           Review Of Systems:       Review of Systems   Constitutional:  Negative for chills, fever and malaise/fatigue.   Respiratory:  Negative for cough and shortness of breath.    Cardiovascular:  Negative for chest pain and palpitations.   Gastrointestinal:  Negative for abdominal pain, diarrhea and vomiting.   Genitourinary:  Negative for dysuria and  "hematuria.   Musculoskeletal:  Negative for falls and myalgias.   Skin:  Negative for rash.   Neurological:  Negative for tremors, seizures and headaches.   Psychiatric/Behavioral:          See HPI        Current Evaluation:       Constitutional  Vitals:  Most recent vital signs, dated less than 90 days prior to this appointment, were reviewed.    Vitals:    11/10/22 0811   BP: 113/67   Pulse: 87   Weight: 84.6 kg (186 lb 8.2 oz)   Height: 5' 1" (1.549 m)        General:  unremarkable, age appropriate, well nourished, casually dressed, neatly groomed, obese       Musculoskeletal  Muscle Strength/Tone:  no dyskinesia, no dystonia, no tremor, no tic   Gait & Station:  non-ataxic      Relevant Elements of Neurological Exam: normal gait      Mental Status Evaluation:  Appearance:  unremarkable, age appropriate, well nourished, casually dressed, neatly groomed, obese   Behavior:  normal, friendly and cooperative, eye contact normal   Speech:  no latency; no press   Mood:  euthymic, tearful at times   Affect:  congruent and appropriate   Thought Process:  normal and logical   Thought Content:  normal, no suicidality, no homicidality, delusions, or paranoia   Sensorium:  grossly intact   Cognition:  grossly intact and fund of knowledge intact and appropriate to age and level of education   Insight:  intact, has awareness of illness   Judgment:  behavior is adequate to circumstances, age appropriate       Functioning in Relationships:  Spouse/Partner/Family: has a good relationship with her identical twin sister and her father; her relationship with her brother is improving; poor relationsip with her mother; recently broke up with her boyfriend  Peers: supportive friends and co-workers  Employers: stable but very stressful      Labs: reviewed most recent labs    Laboratory Data  Office Visit on 10/29/2022   Component Date Value Ref Range Status    POC Rapid COVID 10/29/2022 Negative  Negative Final     " Acceptable 10/29/2022 Yes   Final    POC Molecular Influenza A Ag 10/29/2022 Positive (A)  Negative, Not Reported Final    POC Molecular Influenza B Ag 10/29/2022 Negative  Negative, Not Reported Final     Acceptable 10/29/2022 Yes   Final         Medications  Outpatient Encounter Medications as of 11/10/2022   Medication Sig Dispense Refill    azelastine (ASTELIN) 137 mcg (0.1 %) nasal spray 1 spray (137 mcg total) by Nasal route 2 (two) times daily. (Patient not taking: Reported on 10/29/2022) 30 mL 1    DULoxetine (CYMBALTA) 60 MG capsule Take 1 capsule (60 mg total) by mouth once daily. 90 capsule 3    multivitamin (THERAGRAN) per tablet Take 1 tablet by mouth once daily.       No facility-administered encounter medications on file as of 11/10/2022.           Assessment - Diagnosis - Goals:     Impression:       ICD-10-CM ICD-9-CM   1. Generalized anxiety disorder with panic attacks  F41.1 300.02    F41.0 300.01   2. MDD (major depressive disorder), recurrent episode, moderate  F33.1 296.32   3. Psychophysiological insomnia  F51.04 307.42       Strengths and Liabilities: Strength: Patient accepts guidance/feedback, Strength: Patient is expressive/articulate., Strength: Patient is intelligent., Strength: Patient is motivated for change., Strength: Patient is physically healthy., Strength: Patient has positive support network., Strength: Patient has reasonable judgment., Strength: Patient is stable., Liability: Patient lacks coping skills.    Treatment Goals:    Depression: decreasing worthlessness, increasing energy, increasing interest in usual activities, increasing motivation, increasing self-reward for positive behaviors (one/day), increasing self-reward for positive thoughts (one/day), increasing social contacts (three/week), reducing excessive guilt, reducing fatigue and reducing negative automatic thoughts    Anxiety: acquiring relapse prevention skills, eliminating assumptions about  dangerousness of anxiety, positive value of worry, or other assumptions, eliminating avoidance, modifying schemata of threat/vulnerability/need for control, reducing negative automatic thoughts, reducing physical symptoms of anxiety and reducing time spent worrying (<30 minutes/day)    Panic: acquiring breathing skills, acquiring relapse prevention skills, eliminating all avoidance behavior, eliminating conditioned anxiety response to physical sensations, eliminating safety behaviors, engaging in all previously avoided activities, no panic attacks for 1 month, reducing physical symptoms of anxiety/panic and reporting that fear of future panic attacks has been reduced to less than 1 on a scale of 0-10    Insomnia: will get 7-8 hours of restful sleep each night, will limit consumption of food and drinks before bed and will limit intake of caffeine (coffee, tea, soda), will cut back on things that may impede normal sleep patterns (e.g., alcohol and some medications), will have 30 minutes of quiet time before going to bed each night (e.g., read, meditate), will avoid overly stimulating shows/movies/video games before bedtime and will avoid watching TV and chatting on the phone while in bed , and if not asleep in 20 minutes, get up and do something for a bit, rather than try to force sleep      Treatment Plan/Recommendations:   Medication Management:  Wean off Cymbalta. Take 40 mg q day x 10 days then take 20 mg q day x 10 days then discontinue  Start Effexor XR 37.5 mg q am x 10 days once on Cymbalta 20 mg q day then increase to 75 mg q am  Continue with therapy  Labs: most recent labs reviewed; order CMP, CBC, TSH, and vitamin D  The treatment plan and follow up plan were reviewed with the patient.  Discussed with patient informed consent, risks vs. benefits, alternative treatments, side effect profile and the inherent unpredictability of individual responses to treatments and all medications prescribed. The patient  expresses understanding of the above and displays the capacity to agree with this current plan and had no other questions.   Encouraged the patient to keep future appointments.   Take medications as prescribed and abstain from substance use.   Pt was told to present to ED or call 911 for SI/HI plan or intent, psychosis, or other psychiatric or medical emergency, and pt agrees to this and verbalized understanding.      Return to Clinic:  3-4 weeks      Face to Face time with patient: 54 minutes  Total time: 74 minutes of total time spent on the encounter, which includes face to face time and non-face to face time preparing to see the patient (eg, review of tests), Obtaining and/or reviewing separately obtained history, Documenting clinical information in the electronic or other health record, Independently interpreting results (not separately reported) and communicating results to the patient/family/caregiver, or Care coordination (not separately reported).         Barb Bah, MSN, APRN, PMHNP-BC  [facility]

## 2022-12-05 ENCOUNTER — PATIENT MESSAGE (OUTPATIENT)
Dept: PSYCHIATRY | Facility: CLINIC | Age: 42
End: 2022-12-05
Payer: COMMERCIAL

## 2022-12-14 ENCOUNTER — OFFICE VISIT (OUTPATIENT)
Dept: PSYCHIATRY | Facility: CLINIC | Age: 42
End: 2022-12-14
Payer: COMMERCIAL

## 2022-12-14 DIAGNOSIS — F51.04 PSYCHOPHYSIOLOGICAL INSOMNIA: ICD-10-CM

## 2022-12-14 DIAGNOSIS — F41.0 GENERALIZED ANXIETY DISORDER WITH PANIC ATTACKS: ICD-10-CM

## 2022-12-14 DIAGNOSIS — F33.1 MDD (MAJOR DEPRESSIVE DISORDER), RECURRENT EPISODE, MODERATE: Primary | ICD-10-CM

## 2022-12-14 DIAGNOSIS — F41.1 GENERALIZED ANXIETY DISORDER WITH PANIC ATTACKS: ICD-10-CM

## 2022-12-14 PROCEDURE — 1159F MED LIST DOCD IN RCRD: CPT | Mod: CPTII,95,, | Performed by: NURSE PRACTITIONER

## 2022-12-14 PROCEDURE — 99214 PR OFFICE/OUTPT VISIT, EST, LEVL IV, 30-39 MIN: ICD-10-PCS | Mod: 95,,, | Performed by: NURSE PRACTITIONER

## 2022-12-14 PROCEDURE — 1159F PR MEDICATION LIST DOCUMENTED IN MEDICAL RECORD: ICD-10-PCS | Mod: CPTII,95,, | Performed by: NURSE PRACTITIONER

## 2022-12-14 PROCEDURE — 1160F RVW MEDS BY RX/DR IN RCRD: CPT | Mod: CPTII,95,, | Performed by: NURSE PRACTITIONER

## 2022-12-14 PROCEDURE — 1160F PR REVIEW ALL MEDS BY PRESCRIBER/CLIN PHARMACIST DOCUMENTED: ICD-10-PCS | Mod: CPTII,95,, | Performed by: NURSE PRACTITIONER

## 2022-12-14 PROCEDURE — 99214 OFFICE O/P EST MOD 30 MIN: CPT | Mod: 95,,, | Performed by: NURSE PRACTITIONER

## 2022-12-14 RX ORDER — VENLAFAXINE HYDROCHLORIDE 75 MG/1
75 CAPSULE, EXTENDED RELEASE ORAL DAILY
Qty: 30 CAPSULE | Refills: 0 | Status: SHIPPED | OUTPATIENT
Start: 2022-12-14 | End: 2023-01-04 | Stop reason: SDUPTHER

## 2022-12-14 NOTE — PROGRESS NOTES
12/14/2022   Laura Fitzpatrick  1980  [id number]    Outpatient Psychiatry Follow-Up Visit (MD/NP) - Telemedicine Visit      The patient location is: Patient reported that their location at the time of this visit was in the [facility]       Visit type: audiovisual    Each patient to whom he or she provides medical services by telemedicine is:  (1) informed of the relationship between the physician and patient and the respective role of any other health care provider with respect to management of the patient; and (2) notified that he or she may decline to receive medical services by telemedicine and may withdraw from such care at any time.          Chief Complaint:  Laura Fitzpatrick, a 42 y.o. female,who presents today for follow up of depression and anxiety.      Interval History/Subjective Report/Content of Current Session:     Today the pt reports she has not noticed any improvement in her mood or anxiety yet since starting Effexor XR. Denies any difficulty waning off Cymbalta and tapering the dose of Effexor XR. Has only been on 75 mg q day for about 2 weeks.    She states that she started running again and that this has significantly improved her sleep. She ran a race this weekend.   Work continues to be very stressful. 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    ASE of psych meds: denies    Pt denies recurrent thoughts of death and denies any suicidal or homicidal plans or intentions.     Denies any sxs of kacie or hypomania. Denies AVH, paranoia and delusions. No objective s/sx of psychosis, kacie, or hypomania.     Discussed staying on 75 mg q day for a couple more weeks. May increase to 150 mg q day.      Psychotherapy:  Target symptoms: depression, anxiety   Why chosen therapy is appropriate versus another modality: relevant to diagnosis, patient responds to this modality  Outcome monitoring methods: self-report, observation  Therapeutic intervention type:  supportive psychotherapy  Topics discussed/themes: work stress, building skills sets for symptom management  The patient's response to the intervention is accepting. The patient's progress toward treatment goals is good, fair.   Duration of intervention: 7 minutes.      Psychotropic medication review  Previous Trials-  Wellbutrin - severe anxiety  Zoloft  Melatonin - weird dreams  Cymbalta      Current meds-  Effexor XR          Review of Systems       Review of Systems   Constitutional:  Negative for chills, fever and malaise/fatigue.   Respiratory:  Negative for cough and shortness of breath.    Cardiovascular:  Negative for chest pain and palpitations.   Gastrointestinal:  Negative for abdominal pain, diarrhea and vomiting.   Genitourinary:  Negative for dysuria and hematuria.   Musculoskeletal:  Negative for falls and myalgias.   Skin:  Negative for rash.   Neurological:  Negative for tremors, seizures and headaches.   Psychiatric/Behavioral:          See HPI       Past Medical, Family and Social History: The patient's past medical, family and social history, allergies, current medications, past surgical history, and problem list have been reviewed and updated as appropriate within the electronic medical record.      Compliance: yes    Side effects: None    Risk Parameters:  Patient reports no suicidal ideation  Patient reports no homicidal ideation  Patient reports no self-injurious behavior  Patient reports no violent behavior    Exam (detailed: at least 9 elements; comprehensive: all 15 elements)   Constitutional  Vitals:  Most recent vital signs, dated less than 90 days prior to this appointment, were reviewed.   There were no vitals filed for this visit.     General:  unremarkable, age appropriate, well nourished, casually dressed, neatly groomed, obese       Musculoskeletal  Muscle Strength/Tone:  not examined - CLAUDIA due to virtual visit   Gait & Station:  CLAUDIA due to virtual visit      Psychiatric      Appearance:  unremarkable, age appropriate, well nourished, casually dressed, neatly groomed, obese   Behavior:  normal, friendly and cooperative, eye contact normal     Speech:  no latency; no press   Mood & Affect:  euthymic  congruent and appropriate   Thought Process:  normal and logical   Associations:  intact   Thought Content:  normal, no suicidality, no homicidality, delusions, or paranoia   Insight:  intact, has awareness of illness   Judgement: behavior is adequate to circumstances, age appropriate   Orientation:  grossly intact   Memory: intact for content of interview   Language: grossly intact   Attention Span & Concentration:  able to focus   Fund of Knowledge:  intact and appropriate to age and level of education     Medications:  Outpatient Encounter Medications as of 12/14/2022   Medication Sig Dispense Refill    DULoxetine (CYMBALTA) 20 MG capsule Take 2 capsules (40 mg total) by mouth once daily for 10 days, THEN 1 capsule (20 mg total) once daily for 10 days. 30 capsule 0    multivitamin (THERAGRAN) per tablet Take 1 tablet by mouth once daily.      venlafaxine (EFFEXOR-XR) 37.5 MG 24 hr capsule Take 1 capsule (37.5 mg total) by mouth once daily for 10 days, THEN 2 capsules (75 mg total) once daily for 20 days. 50 capsule 0     No facility-administered encounter medications on file as of 12/14/2022.       Allergy:  Review of patient's allergies indicates:   Allergen Reactions    Wellbutrin [bupropion hcl] Anxiety         Assessment and Diagnosis   Status/Progress: Based on the examination today, the patient's problem(s) is/are failing to respond as expected to treatment.  New problems have not been presented today.   Co-morbidities are not complicating management of the primary condition.  There are no active rule-out diagnoses for this patient at this time.         General Impression:       ICD-10-CM ICD-9-CM   1. MDD (major depressive disorder), recurrent episode, moderate   F33.1 296.32   2. Generalized anxiety disorder with panic attacks  F41.1 300.02    F41.0 300.01   3. Psychophysiological insomnia  F51.04 307.42       Intervention/Counseling/Treatment Plan     Medication Management:  Continue Effexor XR 75 mg q am  Labs: reviewed most recent  The treatment plan and follow up plan were reviewed with the patient.  Discussed with patient informed consent, risks vs. benefits, alternative treatments, side effect profile and the inherent unpredictability of individual responses to treatments and all medications prescribed. The patient expresses understanding of the above and displays the capacity to agree with this current plan and had no other questions.  Encouraged Patient to keep future appointments.   Take medications as prescribed and abstain from substance abuse.   Pt was told to present to ED or call 911 for SI/HI plan or intent, psychosis, or other psychiatric or medical emergency, and pt agrees to this and verbalized understanding.        Return to Clinic:  6 weeks      Face-to-face time with patient:  17 minutes  Total time:  24 minutes of total time spent on the encounter, which includes face to face time and non-face to face time preparing to see the patient (eg, review of tests), Obtaining and/or reviewing separately obtained history, Documenting clinical information in the electronic or other health record, Independently interpreting results (not separately reported) and communicating results to the patient/family/caregiver, or Care coordination (not separately reported).       Barb Bah, MSN, APRN, PMHNP-BC [facility]

## 2023-01-04 ENCOUNTER — PATIENT MESSAGE (OUTPATIENT)
Dept: PSYCHIATRY | Facility: CLINIC | Age: 43
End: 2023-01-04
Payer: COMMERCIAL

## 2023-01-04 DIAGNOSIS — F41.0 GENERALIZED ANXIETY DISORDER WITH PANIC ATTACKS: ICD-10-CM

## 2023-01-04 DIAGNOSIS — F33.1 MDD (MAJOR DEPRESSIVE DISORDER), RECURRENT EPISODE, MODERATE: ICD-10-CM

## 2023-01-04 DIAGNOSIS — F41.1 GENERALIZED ANXIETY DISORDER WITH PANIC ATTACKS: ICD-10-CM

## 2023-01-04 RX ORDER — VENLAFAXINE HYDROCHLORIDE 150 MG/1
150 CAPSULE, EXTENDED RELEASE ORAL DAILY
Qty: 30 CAPSULE | Refills: 1 | Status: SHIPPED | OUTPATIENT
Start: 2023-01-04 | End: 2023-03-01 | Stop reason: SDUPTHER

## 2023-01-19 NOTE — TELEPHONE ENCOUNTER
01/19/23                            [address] [place] [id number]    To Whom It May Concern: This is to certify Adrian Santiago was evaluated with Felicia Snow PA-C on 01/19/23 and has been unable to work to medical reason (1/17/23-1/20/23). She cn return to work without restriction on her next scheduled day after 1/20/23.                  Felicia Snow PA-C  [address] [place] [id number]  Dept Phone: [phone] Pt notified to arrive [facility] tomorrow 1/24/17 at 5:30am for surgery with Dr. Messina.  Pre-op instructions reinforced.  She verbalized understanding.

## 2023-03-01 ENCOUNTER — OFFICE VISIT (OUTPATIENT)
Dept: PSYCHIATRY | Facility: CLINIC | Age: 43
End: 2023-03-01
Payer: COMMERCIAL

## 2023-03-01 VITALS
DIASTOLIC BLOOD PRESSURE: 65 MMHG | BODY MASS INDEX: 38.57 KG/M2 | HEART RATE: 86 BPM | SYSTOLIC BLOOD PRESSURE: 132 MMHG | WEIGHT: 204.13 LBS

## 2023-03-01 DIAGNOSIS — F41.1 GENERALIZED ANXIETY DISORDER WITH PANIC ATTACKS: Primary | ICD-10-CM

## 2023-03-01 DIAGNOSIS — F41.0 GENERALIZED ANXIETY DISORDER WITH PANIC ATTACKS: Primary | ICD-10-CM

## 2023-03-01 DIAGNOSIS — F51.04 PSYCHOPHYSIOLOGICAL INSOMNIA: ICD-10-CM

## 2023-03-01 DIAGNOSIS — F33.1 MDD (MAJOR DEPRESSIVE DISORDER), RECURRENT EPISODE, MODERATE: ICD-10-CM

## 2023-03-01 PROCEDURE — 3075F SYST BP GE 130 - 139MM HG: CPT | Mod: CPTII,S$GLB,, | Performed by: NURSE PRACTITIONER

## 2023-03-01 PROCEDURE — 1159F MED LIST DOCD IN RCRD: CPT | Mod: CPTII,S$GLB,, | Performed by: NURSE PRACTITIONER

## 2023-03-01 PROCEDURE — 99214 PR OFFICE/OUTPT VISIT, EST, LEVL IV, 30-39 MIN: ICD-10-PCS | Mod: S$GLB,,, | Performed by: NURSE PRACTITIONER

## 2023-03-01 PROCEDURE — 99999 PR PBB SHADOW E&M-EST. PATIENT-LVL II: ICD-10-PCS | Mod: PBBFAC,,, | Performed by: NURSE PRACTITIONER

## 2023-03-01 PROCEDURE — 3008F PR BODY MASS INDEX (BMI) DOCUMENTED: ICD-10-PCS | Mod: CPTII,S$GLB,, | Performed by: NURSE PRACTITIONER

## 2023-03-01 PROCEDURE — 3008F BODY MASS INDEX DOCD: CPT | Mod: CPTII,S$GLB,, | Performed by: NURSE PRACTITIONER

## 2023-03-01 PROCEDURE — 3078F PR MOST RECENT DIASTOLIC BLOOD PRESSURE < 80 MM HG: ICD-10-PCS | Mod: CPTII,S$GLB,, | Performed by: NURSE PRACTITIONER

## 2023-03-01 PROCEDURE — 3075F PR MOST RECENT SYSTOLIC BLOOD PRESS GE 130-139MM HG: ICD-10-PCS | Mod: CPTII,S$GLB,, | Performed by: NURSE PRACTITIONER

## 2023-03-01 PROCEDURE — 1160F RVW MEDS BY RX/DR IN RCRD: CPT | Mod: CPTII,S$GLB,, | Performed by: NURSE PRACTITIONER

## 2023-03-01 PROCEDURE — 99214 OFFICE O/P EST MOD 30 MIN: CPT | Mod: S$GLB,,, | Performed by: NURSE PRACTITIONER

## 2023-03-01 PROCEDURE — 99999 PR PBB SHADOW E&M-EST. PATIENT-LVL II: CPT | Mod: PBBFAC,,, | Performed by: NURSE PRACTITIONER

## 2023-03-01 PROCEDURE — 90833 PR PSYCHOTHERAPY W/PATIENT W/E&M, 30 MIN (ADD ON): ICD-10-PCS | Mod: S$GLB,,, | Performed by: NURSE PRACTITIONER

## 2023-03-01 PROCEDURE — 1159F PR MEDICATION LIST DOCUMENTED IN MEDICAL RECORD: ICD-10-PCS | Mod: CPTII,S$GLB,, | Performed by: NURSE PRACTITIONER

## 2023-03-01 PROCEDURE — 3078F DIAST BP <80 MM HG: CPT | Mod: CPTII,S$GLB,, | Performed by: NURSE PRACTITIONER

## 2023-03-01 PROCEDURE — 1160F PR REVIEW ALL MEDS BY PRESCRIBER/CLIN PHARMACIST DOCUMENTED: ICD-10-PCS | Mod: CPTII,S$GLB,, | Performed by: NURSE PRACTITIONER

## 2023-03-01 PROCEDURE — 90833 PSYTX W PT W E/M 30 MIN: CPT | Mod: S$GLB,,, | Performed by: NURSE PRACTITIONER

## 2023-03-01 RX ORDER — VENLAFAXINE HYDROCHLORIDE 150 MG/1
150 CAPSULE, EXTENDED RELEASE ORAL DAILY
Qty: 30 CAPSULE | Refills: 3 | Status: SHIPPED | OUTPATIENT
Start: 2023-03-01 | End: 2023-06-01 | Stop reason: SDUPTHER

## 2023-03-01 NOTE — PROGRESS NOTES
3/1/2023   Laura Fitzpatrick  1980  [id number]    Outpatient Psychiatry Follow-Up Visit (MD/NP)       Chief Complaint:  Laura Fitzpatrick, a 42 y.o. female,who presents today for follow up of depression and anxiety.      Interval History/Subjective Report/Content of Current Session:     At the beginning of January the pt messaged me and Effexor XR was increased to 150 mg q day. Since the dose was increased, she notes it is easier for her to cope with stress and with changes. Feels less overwhelmed. Sleeping ok. Wakes sometimes during the night.    Is still running.     Having difficulties in her relationship with her ex.    Work continues to be very stressful. 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    ASE of psych meds: denies    Pt denies recurrent thoughts of death and denies any suicidal or homicidal plans or intentions.     Denies any sxs of kacie or hypomania. Denies AVH, paranoia and delusions. No objective s/sx of psychosis, kacie, or hypomania.         Psychotherapy:  Target symptoms: depression, anxiety   Why chosen therapy is appropriate versus another modality: relevant to diagnosis, patient responds to this modality  Outcome monitoring methods: self-report, observation  Therapeutic intervention type: supportive psychotherapy  Topics discussed/themes: work stress, building skills sets for symptom management  The patient's response to the intervention is accepting. The patient's progress toward treatment goals is good.   Duration of intervention: 17 minutes.      Psychotropic medication review  Previous Trials-  Wellbutrin - severe anxiety  Zoloft  Melatonin - weird dreams  Cymbalta      Current meds-  Effexor XR          Review of Systems       Review of Systems   Constitutional:  Negative for chills, fever and malaise/fatigue.   Respiratory:  Negative for cough and shortness of breath.    Cardiovascular:  Negative for chest pain and palpitations.    Gastrointestinal:  Negative for abdominal pain, diarrhea and vomiting.   Genitourinary:  Negative for dysuria and hematuria.   Musculoskeletal:  Negative for falls and myalgias.   Skin:  Negative for rash.   Neurological:  Negative for tremors, seizures and headaches.   Psychiatric/Behavioral:          See HPI       Past Medical, Family and Social History: The patient's past medical, family and social history, allergies, current medications, past surgical history, and problem list have been reviewed and updated as appropriate within the electronic medical record.      Compliance: yes    Risk Parameters:  Patient reports no suicidal ideation  Patient reports no homicidal ideation  Patient reports no self-injurious behavior  Patient reports no violent behavior    Exam (detailed: at least 9 elements; comprehensive: all 15 elements)   Constitutional  Vitals:  Most recent vital signs, dated less than 90 days prior to this appointment, were reviewed.   Vitals:    03/01/23 1550   BP: 132/65   Pulse: 86   Weight: 92.6 kg (204 lb 2.3 oz)        General:  unremarkable, age appropriate, well nourished, casually dressed, neatly groomed, obese       Musculoskeletal  Muscle Strength/Tone:  no dyskinesia, no dystonia, no tremor, no tic   Gait & Station:  non-ataxic     Psychiatric      Appearance:  unremarkable, age appropriate, well nourished, casually dressed, neatly groomed, obese   Behavior:  normal, friendly and cooperative, eye contact normal     Speech:  no latency; no press   Mood & Affect:  euthymic  congruent and appropriate   Thought Process:  normal and logical   Associations:  intact   Thought Content:  normal, no suicidality, no homicidality, delusions, or paranoia   Insight:  intact, has awareness of illness   Judgement: behavior is adequate to circumstances, age appropriate   Orientation:  grossly intact   Memory: intact for content of interview   Language: grossly intact   Attention Span & Concentration:  able  to focus   Fund of Knowledge:  intact and appropriate to age and level of education     Medications:  Outpatient Encounter Medications as of 3/1/2023   Medication Sig Dispense Refill    multivitamin (THERAGRAN) per tablet Take 1 tablet by mouth once daily.      venlafaxine (EFFEXOR-XR) 150 MG Cp24 Take 1 capsule (150 mg total) by mouth once daily. 30 capsule 1     No facility-administered encounter medications on file as of 3/1/2023.       Allergy:  Review of patient's allergies indicates:   Allergen Reactions    Wellbutrin [bupropion hcl] Anxiety         Assessment and Diagnosis   Status/Progress: Based on the examination today, the patient's problem(s) is/are improved and well controlled.  New problems have not been presented today.   Co-morbidities are not complicating management of the primary condition.  There are no active rule-out diagnoses for this patient at this time.         General Impression:       ICD-10-CM ICD-9-CM   1. Generalized anxiety disorder with panic attacks  F41.1 300.02    F41.0 300.01   2. MDD (major depressive disorder), recurrent episode, moderate  F33.1 296.32   3. Psychophysiological insomnia  F51.04 307.42       Intervention/Counseling/Treatment Plan     Medication Management:  Continue Effexor  mg q am  Labs: reviewed most recent  The treatment plan and follow up plan were reviewed with the patient.  Discussed with patient informed consent, risks vs. benefits, alternative treatments, side effect profile and the inherent unpredictability of individual responses to treatments and all medications prescribed. The patient expresses understanding of the above and displays the capacity to agree with this current plan and had no other questions.  Encouraged Patient to keep future appointments.   Take medications as prescribed and abstain from substance abuse.   Pt was told to present to ED or call 911 for SI/HI plan or intent, psychosis, or other psychiatric or medical emergency, and  pt agrees to this and verbalized understanding.        Return to Clinic: 3 months, or sooner if needed      Face-to-face time with patient:  34 minutes  Total time:  38 minutes of total time spent on the encounter, which includes face to face time and non-face to face time preparing to see the patient (eg, review of tests), Obtaining and/or reviewing separately obtained history, Documenting clinical information in the electronic or other health record, Independently interpreting results (not separately reported) and communicating results to the patient/family/caregiver, or Care coordination (not separately reported).       Barb Bah, MSN, APRN, PMHNP-BC  [facility]

## 2023-04-01 ENCOUNTER — PATIENT MESSAGE (OUTPATIENT)
Dept: PSYCHIATRY | Facility: CLINIC | Age: 43
End: 2023-04-01
Payer: COMMERCIAL

## 2023-06-01 ENCOUNTER — OFFICE VISIT (OUTPATIENT)
Dept: PSYCHIATRY | Facility: CLINIC | Age: 43
End: 2023-06-01
Payer: COMMERCIAL

## 2023-06-01 VITALS
WEIGHT: 203.5 LBS | SYSTOLIC BLOOD PRESSURE: 136 MMHG | HEART RATE: 78 BPM | DIASTOLIC BLOOD PRESSURE: 87 MMHG | BODY MASS INDEX: 38.45 KG/M2

## 2023-06-01 DIAGNOSIS — F51.04 PSYCHOPHYSIOLOGICAL INSOMNIA: ICD-10-CM

## 2023-06-01 DIAGNOSIS — F41.0 GENERALIZED ANXIETY DISORDER WITH PANIC ATTACKS: ICD-10-CM

## 2023-06-01 DIAGNOSIS — F41.1 GENERALIZED ANXIETY DISORDER WITH PANIC ATTACKS: ICD-10-CM

## 2023-06-01 DIAGNOSIS — F33.1 MDD (MAJOR DEPRESSIVE DISORDER), RECURRENT EPISODE, MODERATE: Primary | ICD-10-CM

## 2023-06-01 PROCEDURE — 3075F SYST BP GE 130 - 139MM HG: CPT | Mod: CPTII,S$GLB,, | Performed by: NURSE PRACTITIONER

## 2023-06-01 PROCEDURE — 1160F PR REVIEW ALL MEDS BY PRESCRIBER/CLIN PHARMACIST DOCUMENTED: ICD-10-PCS | Mod: CPTII,S$GLB,, | Performed by: NURSE PRACTITIONER

## 2023-06-01 PROCEDURE — 3079F DIAST BP 80-89 MM HG: CPT | Mod: CPTII,S$GLB,, | Performed by: NURSE PRACTITIONER

## 2023-06-01 PROCEDURE — 3008F BODY MASS INDEX DOCD: CPT | Mod: CPTII,S$GLB,, | Performed by: NURSE PRACTITIONER

## 2023-06-01 PROCEDURE — 1159F PR MEDICATION LIST DOCUMENTED IN MEDICAL RECORD: ICD-10-PCS | Mod: CPTII,S$GLB,, | Performed by: NURSE PRACTITIONER

## 2023-06-01 PROCEDURE — 99214 OFFICE O/P EST MOD 30 MIN: CPT | Mod: S$GLB,,, | Performed by: NURSE PRACTITIONER

## 2023-06-01 PROCEDURE — 1160F RVW MEDS BY RX/DR IN RCRD: CPT | Mod: CPTII,S$GLB,, | Performed by: NURSE PRACTITIONER

## 2023-06-01 PROCEDURE — 1159F MED LIST DOCD IN RCRD: CPT | Mod: CPTII,S$GLB,, | Performed by: NURSE PRACTITIONER

## 2023-06-01 PROCEDURE — 3008F PR BODY MASS INDEX (BMI) DOCUMENTED: ICD-10-PCS | Mod: CPTII,S$GLB,, | Performed by: NURSE PRACTITIONER

## 2023-06-01 PROCEDURE — 99999 PR PBB SHADOW E&M-EST. PATIENT-LVL II: CPT | Mod: PBBFAC,,, | Performed by: NURSE PRACTITIONER

## 2023-06-01 PROCEDURE — 3075F PR MOST RECENT SYSTOLIC BLOOD PRESS GE 130-139MM HG: ICD-10-PCS | Mod: CPTII,S$GLB,, | Performed by: NURSE PRACTITIONER

## 2023-06-01 PROCEDURE — 99999 PR PBB SHADOW E&M-EST. PATIENT-LVL II: ICD-10-PCS | Mod: PBBFAC,,, | Performed by: NURSE PRACTITIONER

## 2023-06-01 PROCEDURE — 3079F PR MOST RECENT DIASTOLIC BLOOD PRESSURE 80-89 MM HG: ICD-10-PCS | Mod: CPTII,S$GLB,, | Performed by: NURSE PRACTITIONER

## 2023-06-01 PROCEDURE — 99214 PR OFFICE/OUTPT VISIT, EST, LEVL IV, 30-39 MIN: ICD-10-PCS | Mod: S$GLB,,, | Performed by: NURSE PRACTITIONER

## 2023-06-01 RX ORDER — VENLAFAXINE HYDROCHLORIDE 150 MG/1
150 CAPSULE, EXTENDED RELEASE ORAL DAILY
Qty: 30 CAPSULE | Refills: 3 | Status: SHIPPED | OUTPATIENT
Start: 2023-06-01 | End: 2023-07-18 | Stop reason: SDUPTHER

## 2023-06-01 RX ORDER — ARIPIPRAZOLE 2 MG/1
2 TABLET ORAL DAILY
Qty: 30 TABLET | Refills: 1 | Status: SHIPPED | OUTPATIENT
Start: 2023-06-01 | End: 2023-07-18 | Stop reason: SDUPTHER

## 2023-06-01 NOTE — PROGRESS NOTES
6/1/2023   Laura Fitzpatrick  1980  [id number]    Outpatient Psychiatry Follow-Up Visit (MD/NP)       Chief Complaint:  Laura Fitzpatrick, a 43 y.o. female,who presents today for follow up of depression and anxiety.      Interval History/Subjective Report/Content of Current Session:     The pt reports that her anxiety is well controlled on her current dose of Effexor XR. Doesn't panic and is much less irritable. However, she is still experiencing depressed mood, decreased energy, and amotivation. She also has been withdrawing from others. Sleeping ok. Discussed med options including increasing the dose, augmenting with another medication, or switching to a different AD. The pt prefers to augment. She does not want to increase the dose because her fatigue worsened when the dose was increased from 75 to 150 mg.    Hasn't been running because of her weight. She signed up for two races in December and will be starting to prepare for them.    Work continues to be very stressful. 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    ASE of psych meds: see above    Pt denies recurrent thoughts of death and denies any suicidal or homicidal plans or intentions.     Denies any sxs of kacie or hypomania. Denies AVH, paranoia and delusions. No objective s/sx of psychosis, kacie, or hypomania.         Psychotherapy:  Target symptoms: depression, anxiety   Why chosen therapy is appropriate versus another modality: relevant to diagnosis, patient responds to this modality  Outcome monitoring methods: self-report, observation  Therapeutic intervention type: supportive psychotherapy  Topics discussed/themes: work stress, building skills sets for symptom management  The patient's response to the intervention is accepting. The patient's progress toward treatment goals is good.   Duration of intervention: 7  minutes.      Psychotropic medication review  Previous Trials-  Wellbutrin - severe anxiety  Zoloft  Melatonin - weird dreams  Cymbalta      Current meds-  Effexor XR          Review of Systems       Review of Systems   Constitutional:  Negative for chills, fever and malaise/fatigue.   Respiratory:  Negative for cough and shortness of breath.    Cardiovascular:  Negative for chest pain and palpitations.   Gastrointestinal:  Negative for abdominal pain, diarrhea and vomiting.   Genitourinary:  Negative for dysuria and hematuria.   Musculoskeletal:  Negative for falls and myalgias.   Skin:  Negative for rash.   Neurological:  Negative for tremors, seizures and headaches.   Psychiatric/Behavioral:          See HPI       Past Medical, Family and Social History: The patient's past medical, family and social history, allergies, current medications, past surgical history, and problem list have been reviewed and updated as appropriate within the electronic medical record.      Compliance: yes    Risk Parameters:  Patient reports no suicidal ideation  Patient reports no homicidal ideation  Patient reports no self-injurious behavior  Patient reports no violent behavior    Exam (detailed: at least 9 elements; comprehensive: all 15 elements)   Constitutional  Vitals:  Most recent vital signs, dated less than 90 days prior to this appointment, were reviewed.   Vitals:    06/01/23 0856   BP: 136/87   Pulse: 78   Weight: 92.3 kg (203 lb 7.8 oz)            Musculoskeletal  Muscle Strength/Tone:  no dyskinesia, no dystonia, no tremor, no tic   Gait & Station:  non-ataxic     Psychiatric      Appearance:  unremarkable, age appropriate, well nourished, casually dressed, neatly groomed, obese   Behavior:  normal, friendly and cooperative, eye contact normal     Speech:  no latency; no press   Mood & Affect:  dysthymic, tearful  congruent and appropriate   Thought Process:  normal and logical   Associations:  intact   Thought Content:   normal, no suicidality, no homicidality, delusions, or paranoia   Insight:  intact, has awareness of illness   Judgement: behavior is adequate to circumstances, age appropriate   Orientation:  grossly intact   Memory: intact for content of interview   Language: grossly intact   Attention Span & Concentration:  able to focus   Fund of Knowledge:  intact and appropriate to age and level of education     Medications:  Outpatient Encounter Medications as of 6/1/2023   Medication Sig Dispense Refill    multivitamin (THERAGRAN) per tablet Take 1 tablet by mouth once daily.      venlafaxine (EFFEXOR-XR) 150 MG Cp24 Take 1 capsule (150 mg total) by mouth once daily. 30 capsule 3     No facility-administered encounter medications on file as of 6/1/2023.       Allergy:  Review of patient's allergies indicates:   Allergen Reactions    Wellbutrin [bupropion hcl] Anxiety         Assessment and Diagnosis   Status/Progress: Based on the examination today, the patient's problem(s) is/are inadequately controlled.  New problems have not been presented today.   Co-morbidities are not complicating management of the primary condition.  There are no active rule-out diagnoses for this patient at this time.         General Impression:       ICD-10-CM ICD-9-CM   1. MDD (major depressive disorder), recurrent episode, moderate  F33.1 296.32   2. Generalized anxiety disorder with panic attacks  F41.1 300.02    F41.0 300.01   3. Psychophysiological insomnia  F51.04 307.42       Intervention/Counseling/Treatment Plan     Medication Management:  Continue Effexor  mg q am  Start trial of Abilify 2 mg q day  Labs: reviewed most recent  Antipsychotics: I discussed with the patient the risks of extrapyramidal side effects (dystonia, akathisia, parkinsonism), metabolic syndrome (inlcuding hyperglycemia and hyperlipidemia), Neuroleptic Malignant Syndrome (fever, muscle rigidity, autonomic instability), orthostatic hypotension, and tardive  dyskinesia with antipsychotic use. Pt was told to report these symptoms right away. Pt was notified that it is possible for these movements to become permanent. The risks and benefits of medication were discussed with the patient. The patient expresses understanding of the above and displays the capacity to agree with this treatment given said understanding. Patient also agrees that, currently, the benefits outweigh the risks and would like to pursue treatment at this time.  The treatment plan and follow up plan were reviewed with the patient.  Discussed with patient informed consent, risks vs. benefits, alternative treatments, side effect profile and the inherent unpredictability of individual responses to treatments and all medications prescribed. The patient expresses understanding of the above and displays the capacity to agree with this current plan and had no other questions.  Encouraged Patient to keep future appointments.   Take medications as prescribed and abstain from substance abuse.   Pt was told to present to ED or call 911 for SI/HI plan or intent, psychosis, or other psychiatric or medical emergency, and pt agrees to this and verbalized understanding.        Return to Clinic: 3 weeks      Face-to-face time with patient:  27 minutes  Total time:  32 minutes of total time spent on the encounter, which includes face to face time and non-face to face time preparing to see the patient (eg, review of tests), Obtaining and/or reviewing separately obtained history, Documenting clinical information in the electronic or other health record, Independently interpreting results (not separately reported) and communicating results to the patient/family/caregiver, or Care coordination (not separately reported).       Barb Bah, MSN, APRN, PMHNP-BC [facility]

## 2023-07-03 ENCOUNTER — PATIENT MESSAGE (OUTPATIENT)
Dept: OBSTETRICS AND GYNECOLOGY | Facility: CLINIC | Age: 43
End: 2023-07-03
Payer: COMMERCIAL

## 2023-07-18 ENCOUNTER — OFFICE VISIT (OUTPATIENT)
Dept: PSYCHIATRY | Facility: CLINIC | Age: 43
End: 2023-07-18
Payer: COMMERCIAL

## 2023-07-18 VITALS
BODY MASS INDEX: 39.14 KG/M2 | DIASTOLIC BLOOD PRESSURE: 70 MMHG | WEIGHT: 207.13 LBS | HEART RATE: 108 BPM | SYSTOLIC BLOOD PRESSURE: 121 MMHG

## 2023-07-18 DIAGNOSIS — F51.04 PSYCHOPHYSIOLOGICAL INSOMNIA: ICD-10-CM

## 2023-07-18 DIAGNOSIS — F41.1 GENERALIZED ANXIETY DISORDER WITH PANIC ATTACKS: ICD-10-CM

## 2023-07-18 DIAGNOSIS — F41.0 GENERALIZED ANXIETY DISORDER WITH PANIC ATTACKS: ICD-10-CM

## 2023-07-18 DIAGNOSIS — F33.1 MDD (MAJOR DEPRESSIVE DISORDER), RECURRENT EPISODE, MODERATE: Primary | ICD-10-CM

## 2023-07-18 PROCEDURE — 99214 OFFICE O/P EST MOD 30 MIN: CPT | Mod: S$GLB,,, | Performed by: NURSE PRACTITIONER

## 2023-07-18 PROCEDURE — 99999 PR PBB SHADOW E&M-EST. PATIENT-LVL II: CPT | Mod: PBBFAC,,, | Performed by: NURSE PRACTITIONER

## 2023-07-18 PROCEDURE — 1159F PR MEDICATION LIST DOCUMENTED IN MEDICAL RECORD: ICD-10-PCS | Mod: CPTII,S$GLB,, | Performed by: NURSE PRACTITIONER

## 2023-07-18 PROCEDURE — 1160F PR REVIEW ALL MEDS BY PRESCRIBER/CLIN PHARMACIST DOCUMENTED: ICD-10-PCS | Mod: CPTII,S$GLB,, | Performed by: NURSE PRACTITIONER

## 2023-07-18 PROCEDURE — 99214 PR OFFICE/OUTPT VISIT, EST, LEVL IV, 30-39 MIN: ICD-10-PCS | Mod: S$GLB,,, | Performed by: NURSE PRACTITIONER

## 2023-07-18 PROCEDURE — 99999 PR PBB SHADOW E&M-EST. PATIENT-LVL II: ICD-10-PCS | Mod: PBBFAC,,, | Performed by: NURSE PRACTITIONER

## 2023-07-18 PROCEDURE — 1159F MED LIST DOCD IN RCRD: CPT | Mod: CPTII,S$GLB,, | Performed by: NURSE PRACTITIONER

## 2023-07-18 PROCEDURE — 1160F RVW MEDS BY RX/DR IN RCRD: CPT | Mod: CPTII,S$GLB,, | Performed by: NURSE PRACTITIONER

## 2023-07-18 RX ORDER — VENLAFAXINE HYDROCHLORIDE 150 MG/1
150 CAPSULE, EXTENDED RELEASE ORAL DAILY
Qty: 30 CAPSULE | Refills: 2 | Status: SHIPPED | OUTPATIENT
Start: 2023-07-18 | End: 2023-10-25 | Stop reason: SDUPTHER

## 2023-07-18 RX ORDER — ARIPIPRAZOLE 5 MG/1
5 TABLET ORAL DAILY
Qty: 30 TABLET | Refills: 3 | Status: SHIPPED | OUTPATIENT
Start: 2023-07-18 | End: 2023-10-25 | Stop reason: SDUPTHER

## 2023-07-18 NOTE — PROGRESS NOTES
7/18/2023   Laura Fitzpatrick  1980  [id number]    Outpatient Psychiatry Follow-Up Visit (MD/NP)       Chief Complaint:  Laura Fitzpatrick, a 43 y.o. female,who presents today for follow up of depression and anxiety.      Interval History/Subjective Report/Content of Current Session:     Pt reports mood is still up and down but has improved overall since starting Abilify. Energy and motivation have also improved. 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  She had the option of changing to a different school but chose to stay where she was as she is moving into a new home soon and didn't want to make too many big changes at once.  She is working two summer jobs now that school is out. 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    ASE of psych meds: denies    Pt denies recurrent thoughts of death and denies any suicidal or homicidal plans or intentions.     Denies any sxs of kacie or hypomania. Denies AVH, paranoia and delusions. No objective s/sx of psychosis, kacie, or hypomania.         Psychotherapy:  Target symptoms: depression, anxiety   Why chosen therapy is appropriate versus another modality: relevant to diagnosis, patient responds to this modality  Outcome monitoring methods: self-report, observation  Therapeutic intervention type: supportive psychotherapy  Topics discussed/themes: work stress, building skills sets for symptom management  The patient's response to the intervention is accepting. The patient's progress toward treatment goals is good.   Duration of intervention: 4 minutes.      Psychotropic medication review  Previous Trials-  Wellbutrin - severe anxiety  Zoloft  Melatonin - weird dreams  Cymbalta      Current meds-  Effexor XR  Abilify        Review of Systems       Review of Systems   Constitutional:  Negative for chills, fever and malaise/fatigue.    Respiratory:  Negative for cough and shortness of breath.    Cardiovascular:  Negative for chest pain and palpitations.   Gastrointestinal:  Negative for abdominal pain, diarrhea and vomiting.   Genitourinary:  Negative for dysuria and hematuria.   Musculoskeletal:  Negative for falls and myalgias.   Skin:  Negative for rash.   Neurological:  Negative for tremors, seizures and headaches.   Psychiatric/Behavioral:          See HPI       Past Medical, Family and Social History: The patient's past medical, family and social history, allergies, current medications, past surgical history, and problem list have been reviewed and updated as appropriate within the electronic medical record.      Compliance: yes    Risk Parameters:  Patient reports no suicidal ideation  Patient reports no homicidal ideation  Patient reports no self-injurious behavior  Patient reports no violent behavior    Exam (detailed: at least 9 elements; comprehensive: all 15 elements)   Constitutional  Vitals:  Most recent vital signs, dated less than 90 days prior to this appointment, were reviewed.   There were no vitals filed for this visit.           Musculoskeletal  Muscle Strength/Tone:  no dyskinesia, no dystonia, no tremor, no tic   Gait & Station:  non-ataxic     Psychiatric      Appearance:  unremarkable, age appropriate, well nourished, casually dressed, neatly groomed, obese   Behavior:  normal, friendly and cooperative, eye contact normal     Speech:  no latency; no press   Mood & Affect:  euthymic  congruent and appropriate   Thought Process:  normal and logical   Associations:  intact   Thought Content:  normal, no suicidality, no homicidality, delusions, or paranoia   Insight:  intact, has awareness of illness   Judgement: behavior is adequate to circumstances, age appropriate   Orientation:  grossly intact   Memory: intact for content of interview   Language: grossly intact   Attention Span & Concentration:  able to focus   Fund of  Knowledge:  intact and appropriate to age and level of education     Medications:  Outpatient Encounter Medications as of 7/18/2023   Medication Sig Dispense Refill    ARIPiprazole (ABILIFY) 2 MG Tab Take 1 tablet (2 mg total) by mouth once daily. 30 tablet 1    multivitamin (THERAGRAN) per tablet Take 1 tablet by mouth once daily.      venlafaxine (EFFEXOR-XR) 150 MG Cp24 Take 1 capsule (150 mg total) by mouth once daily. 30 capsule 3     No facility-administered encounter medications on file as of 7/18/2023.       Allergy:  Review of patient's allergies indicates:   Allergen Reactions    Wellbutrin [bupropion hcl] Anxiety         Assessment and Diagnosis   Status/Progress: Based on the examination today, the patient's problem(s) is/are improved.  New problems have not been presented today.   Co-morbidities are not complicating management of the primary condition.  There are no active rule-out diagnoses for this patient at this time.         General Impression:       ICD-10-CM ICD-9-CM   1. MDD (major depressive disorder), recurrent episode, moderate  F33.1 296.32   2. Generalized anxiety disorder with panic attacks  F41.1 300.02    F41.0 300.01   3. Psychophysiological insomnia  F51.04 307.42       Intervention/Counseling/Treatment Plan     Medication Management:  Continue Effexor  mg q am  Increase Abilify to 5 mg q day  Labs: reviewed most recent  Antipsychotics: I discussed with the patient the risks of extrapyramidal side effects (dystonia, akathisia, parkinsonism), metabolic syndrome (inlcuding hyperglycemia and hyperlipidemia), Neuroleptic Malignant Syndrome (fever, muscle rigidity, autonomic instability), orthostatic hypotension, and tardive dyskinesia with antipsychotic use. Pt was told to report these symptoms right away. Pt was notified that it is possible for these movements to become permanent. The risks and benefits of medication were discussed with the patient. The patient expresses  understanding of the above and displays the capacity to agree with this treatment given said understanding. Patient also agrees that, currently, the benefits outweigh the risks and would like to pursue treatment at this time.  The treatment plan and follow up plan were reviewed with the patient.  Discussed with patient informed consent, risks vs. benefits, alternative treatments, side effect profile and the inherent unpredictability of individual responses to treatments and all medications prescribed. The patient expresses understanding of the above and displays the capacity to agree with this current plan and had no other questions.  Encouraged Patient to keep future appointments.   Take medications as prescribed and abstain from substance abuse.   Pt was told to present to ED or call 911 for SI/HI plan or intent, psychosis, or other psychiatric or medical emergency, and pt agrees to this and verbalized understanding.        Return to Clinic: 3 months, or sooner if needed      Face-to-face time with patient:  15 minutes  Total time:  20 minutes of total time spent on the encounter, which includes face to face time and non-face to face time preparing to see the patient (eg, review of tests), Obtaining and/or reviewing separately obtained history, Documenting clinical information in the electronic or other health record, Independently interpreting results (not separately reported) and communicating results to the patient/family/caregiver, or Care coordination (not separately reported).       Barb Bah, MSN, APRN, PMHNP-BC [facility]

## 2023-09-28 ENCOUNTER — OFFICE VISIT (OUTPATIENT)
Dept: OBSTETRICS AND GYNECOLOGY | Facility: CLINIC | Age: 43
End: 2023-09-28
Payer: COMMERCIAL

## 2023-09-28 VITALS
BODY MASS INDEX: 38.21 KG/M2 | WEIGHT: 202.38 LBS | HEIGHT: 61 IN | SYSTOLIC BLOOD PRESSURE: 124 MMHG | DIASTOLIC BLOOD PRESSURE: 78 MMHG

## 2023-09-28 DIAGNOSIS — Z12.31 ENCOUNTER FOR SCREENING MAMMOGRAM FOR MALIGNANT NEOPLASM OF BREAST: ICD-10-CM

## 2023-09-28 DIAGNOSIS — Z01.419 WELL WOMAN EXAM WITH ROUTINE GYNECOLOGICAL EXAM: Primary | ICD-10-CM

## 2023-09-28 PROCEDURE — 3074F SYST BP LT 130 MM HG: CPT | Mod: CPTII,S$GLB,, | Performed by: OBSTETRICS & GYNECOLOGY

## 2023-09-28 PROCEDURE — 3074F PR MOST RECENT SYSTOLIC BLOOD PRESSURE < 130 MM HG: ICD-10-PCS | Mod: CPTII,S$GLB,, | Performed by: OBSTETRICS & GYNECOLOGY

## 2023-09-28 PROCEDURE — 3008F PR BODY MASS INDEX (BMI) DOCUMENTED: ICD-10-PCS | Mod: CPTII,S$GLB,, | Performed by: OBSTETRICS & GYNECOLOGY

## 2023-09-28 PROCEDURE — 99396 PR PREVENTIVE VISIT,EST,40-64: ICD-10-PCS | Mod: S$GLB,,, | Performed by: OBSTETRICS & GYNECOLOGY

## 2023-09-28 PROCEDURE — 1159F MED LIST DOCD IN RCRD: CPT | Mod: CPTII,S$GLB,, | Performed by: OBSTETRICS & GYNECOLOGY

## 2023-09-28 PROCEDURE — 1160F RVW MEDS BY RX/DR IN RCRD: CPT | Mod: CPTII,S$GLB,, | Performed by: OBSTETRICS & GYNECOLOGY

## 2023-09-28 PROCEDURE — 3078F DIAST BP <80 MM HG: CPT | Mod: CPTII,S$GLB,, | Performed by: OBSTETRICS & GYNECOLOGY

## 2023-09-28 PROCEDURE — 3008F BODY MASS INDEX DOCD: CPT | Mod: CPTII,S$GLB,, | Performed by: OBSTETRICS & GYNECOLOGY

## 2023-09-28 PROCEDURE — 99396 PREV VISIT EST AGE 40-64: CPT | Mod: S$GLB,,, | Performed by: OBSTETRICS & GYNECOLOGY

## 2023-09-28 PROCEDURE — 1159F PR MEDICATION LIST DOCUMENTED IN MEDICAL RECORD: ICD-10-PCS | Mod: CPTII,S$GLB,, | Performed by: OBSTETRICS & GYNECOLOGY

## 2023-09-28 PROCEDURE — 99999 PR PBB SHADOW E&M-EST. PATIENT-LVL III: ICD-10-PCS | Mod: PBBFAC,,, | Performed by: OBSTETRICS & GYNECOLOGY

## 2023-09-28 PROCEDURE — 1160F PR REVIEW ALL MEDS BY PRESCRIBER/CLIN PHARMACIST DOCUMENTED: ICD-10-PCS | Mod: CPTII,S$GLB,, | Performed by: OBSTETRICS & GYNECOLOGY

## 2023-09-28 PROCEDURE — 99999 PR PBB SHADOW E&M-EST. PATIENT-LVL III: CPT | Mod: PBBFAC,,, | Performed by: OBSTETRICS & GYNECOLOGY

## 2023-09-28 PROCEDURE — 3078F PR MOST RECENT DIASTOLIC BLOOD PRESSURE < 80 MM HG: ICD-10-PCS | Mod: CPTII,S$GLB,, | Performed by: OBSTETRICS & GYNECOLOGY

## 2023-09-28 NOTE — PROGRESS NOTES
History & Physical  Gynecology      SUBJECTIVE:     Chief Complaint: Well Woman       History of Present Illness:  Annual Exam-Premenopausal  Patient presents for annual exam. The patient has no complaints today. Menstrual cycles are monthly.  The patient is not currently sexually active. GYN screening history: last pap: approximate date  and was abnormal: NILM, neg HPV. The patient participates in regular exercise: yes- training for the [employer] marathon.  Smoking status:  no    Contraception: condoms  MM2022    FH:  Breast cancer: pat grandmother  Colon cancer: neg  Ovarian cancer: neg    Review of patient's allergies indicates:   Allergen Reactions    Wellbutrin [bupropion hcl] Anxiety       Past Medical History:   Diagnosis Date    Abnormal Pap smear 12 years ago    Cryotherapy    Abnormal Pap smear of cervix     Allergy     Anxiety     Cervical radiculopathy 2015    Cervical radiculopathy 2015    Depression     Enlarged thyroid gland 2015    GERD (gastroesophageal reflux disease)     History of psychiatric hospitalization     Hx of psychiatric care     Obesity (BMI 30-39.9)     Psychiatric problem     Right-sided carotid artery disease 2016    Therapy      Past Surgical History:   Procedure Laterality Date    APPENDECTOMY      ESOPHAGOGASTRODUODENOSCOPY N/A 2018    Procedure: EGD (ESOPHAGOGASTRODUODENOSCOPY);  Surgeon: Karl Judge MD;  Location: [facility] ([address] [place] [address]);  Service: Endoscopy;  Laterality: N/A;    INGUINAL HERNIA REPAIR Right 2017    MYRINGOTOMY W/ TUBES       OB History          0    Para   0    Term   0       0    AB   0    Living   0         SAB   0    IAB   0    Ectopic   0    Multiple   0    Live Births                   Family History   Problem Relation Age of Onset    Heart disease Mother 40    Diabetes Mother     Depression Mother     Anxiety disorder Mother     Obesity Mother     Hypertension Father     Diabetes Father     Obesity  Father     Kidney disease Father     Heart disease Maternal Grandmother     Diabetes Maternal Grandmother     Diabetes Maternal Grandfather     Cancer Paternal Grandmother 58        Breast    Diabetes Paternal Grandmother     Breast cancer Paternal Grandmother     Gestational diabetes Sister     Asthma Sister         as a child    Stroke Neg Hx     Colon cancer Neg Hx     Ovarian cancer Neg Hx      Social History     Tobacco Use    Smoking status: Never    Smokeless tobacco: Never   Substance Use Topics    Alcohol use: Yes     Comment: 2 drinks twice/month    Drug use: No       Current Outpatient Medications   Medication Sig    ARIPiprazole (ABILIFY) 5 MG Tab Take 1 tablet (5 mg total) by mouth once daily.    multivitamin (THERAGRAN) per tablet Take 1 tablet by mouth once daily.    venlafaxine (EFFEXOR-XR) 150 MG Cp24 Take 1 capsule (150 mg total) by mouth once daily.     No current facility-administered medications for this visit.         Review of Systems:  Review of Systems   Constitutional:  Negative for activity change, appetite change and fever.   Respiratory:  Negative for shortness of breath.    Cardiovascular:  Negative for chest pain.   Gastrointestinal:  Negative for abdominal pain, nausea and vomiting.   Genitourinary:  Negative for menorrhagia, menstrual problem, pelvic pain, vaginal bleeding, vaginal discharge and vaginal pain.   Integumentary:  Negative for breast mass.   Breast: Negative for lump and mass       OBJECTIVE:     Physical Exam:  Physical Exam  Vitals and nursing note reviewed.   Constitutional:       Appearance: She is well-developed.   Neck:      Thyroid: No thyromegaly.      Trachea: No tracheal deviation.   Cardiovascular:      Rate and Rhythm: Normal rate and regular rhythm.      Heart sounds: Normal heart sounds.   Pulmonary:      Effort: Pulmonary effort is normal.      Breath sounds: Normal breath sounds.   Chest:   Breasts:     Breasts are symmetrical.      Right: No inverted  nipple, mass, nipple discharge, skin change or tenderness.      Left: No inverted nipple, mass, nipple discharge, skin change or tenderness.   Abdominal:      Palpations: Abdomen is soft.   Genitourinary:     Labia:         Right: No rash, tenderness, lesion or injury.         Left: No rash, tenderness, lesion or injury.       Vagina: Normal. No signs of injury and foreign body. No vaginal discharge, erythema, tenderness or bleeding.      Cervix: No cervical motion tenderness, discharge or friability.      Uterus: Not deviated, not enlarged, not fixed and not tender.       Adnexa:         Right: No mass, tenderness or fullness.          Left: No mass, tenderness or fullness.        Comments: Urethra: normal appearing urethra with no masses, tenderness or lesions  Urethral meatus: normal size, anterior vaginal wall with no prolapse, no lesions  Musculoskeletal:      Cervical back: Normal range of motion and neck supple.   Neurological:      Mental Status: She is alert and oriented to person, place, and time.   Psychiatric:         Behavior: Behavior normal.         Thought Content: Thought content normal.         Judgment: Judgment normal.         Chaperoned by: Filomena    ASSESSMENT:       ICD-10-CM ICD-9-CM    1. Well woman exam with routine gynecological exam  Z01.419 V72.31       2. Encounter for screening mammogram for malignant neoplasm of breast  Z12.31 V76.12 Mammo Digital Screening Bilat w/ Michael               Plan:      Laura was seen today for well woman.    Diagnoses and all orders for this visit:    Well woman exam with routine gynecological exam    Encounter for screening mammogram for malignant neoplasm of breast  -     Mammo Digital Screening Bilat w/ Michael; Future          Orders Placed This Encounter   Procedures    Mammo Digital Screening Bilat w/ Michael       Well Woman:  - Pap smear up to date  - Birth control: condoms  - GC/CT:n/a  - Mammogram: ordered  - Smoking cessation: n/a  - Labs: none required    - Vaccines: none required  - Exercise counseling      Follow up in one year for annual or prn.    Cyndy Murcia

## 2023-10-17 ENCOUNTER — HOSPITAL ENCOUNTER (OUTPATIENT)
Dept: RADIOLOGY | Facility: HOSPITAL | Age: 43
Discharge: HOME | End: 2023-10-17
Attending: OBSTETRICS & GYNECOLOGY
Payer: COMMERCIAL

## 2023-10-17 DIAGNOSIS — Z12.31 ENCOUNTER FOR SCREENING MAMMOGRAM FOR MALIGNANT NEOPLASM OF BREAST: ICD-10-CM

## 2023-10-17 PROCEDURE — 77067 SCR MAMMO BI INCL CAD: CPT | Mod: 26,,, | Performed by: RADIOLOGY

## 2023-10-17 PROCEDURE — 77063 MAMMO DIGITAL SCREENING BILAT WITH TOMO: ICD-10-PCS | Mod: 26,,, | Performed by: RADIOLOGY

## 2023-10-17 PROCEDURE — 77067 SCR MAMMO BI INCL CAD: CPT | Mod: TC

## 2023-10-17 PROCEDURE — 77067 MAMMO DIGITAL SCREENING BILAT WITH TOMO: ICD-10-PCS | Mod: 26,,, | Performed by: RADIOLOGY

## 2023-10-17 PROCEDURE — 77063 BREAST TOMOSYNTHESIS BI: CPT | Mod: 26,,, | Performed by: RADIOLOGY

## 2023-10-24 ENCOUNTER — PATIENT MESSAGE (OUTPATIENT)
Dept: PSYCHIATRY | Facility: CLINIC | Age: 43
End: 2023-10-24
Payer: COMMERCIAL

## 2023-10-25 ENCOUNTER — PATIENT MESSAGE (OUTPATIENT)
Dept: PSYCHIATRY | Facility: CLINIC | Age: 43
End: 2023-10-25
Payer: COMMERCIAL

## 2023-10-25 DIAGNOSIS — F41.0 GENERALIZED ANXIETY DISORDER WITH PANIC ATTACKS: ICD-10-CM

## 2023-10-25 DIAGNOSIS — F41.1 GENERALIZED ANXIETY DISORDER WITH PANIC ATTACKS: ICD-10-CM

## 2023-10-25 DIAGNOSIS — F33.1 MDD (MAJOR DEPRESSIVE DISORDER), RECURRENT EPISODE, MODERATE: ICD-10-CM

## 2023-10-25 RX ORDER — VENLAFAXINE HYDROCHLORIDE 150 MG/1
150 CAPSULE, EXTENDED RELEASE ORAL DAILY
Qty: 30 CAPSULE | Refills: 1 | Status: SHIPPED | OUTPATIENT
Start: 2023-10-25 | End: 2024-02-21 | Stop reason: SDUPTHER

## 2023-10-25 RX ORDER — ARIPIPRAZOLE 5 MG/1
5 TABLET ORAL DAILY
Qty: 30 TABLET | Refills: 1 | Status: SHIPPED | OUTPATIENT
Start: 2023-10-25 | End: 2024-01-23 | Stop reason: SDUPTHER

## 2023-11-06 ENCOUNTER — PATIENT MESSAGE (OUTPATIENT)
Dept: PSYCHIATRY | Facility: CLINIC | Age: 43
End: 2023-11-06
Payer: COMMERCIAL

## 2023-12-07 ENCOUNTER — OFFICE VISIT (OUTPATIENT)
Dept: URGENT CARE | Facility: CLINIC | Age: 43
End: 2023-12-07
Payer: COMMERCIAL

## 2023-12-07 VITALS
RESPIRATION RATE: 17 BRPM | HEART RATE: 82 BPM | TEMPERATURE: 98 F | OXYGEN SATURATION: 98 % | SYSTOLIC BLOOD PRESSURE: 133 MMHG | WEIGHT: 202.38 LBS | BODY MASS INDEX: 38.24 KG/M2 | DIASTOLIC BLOOD PRESSURE: 88 MMHG

## 2023-12-07 DIAGNOSIS — J10.1 INFLUENZA B: Primary | ICD-10-CM

## 2023-12-07 DIAGNOSIS — J32.9 BACTERIAL SINUSITIS: ICD-10-CM

## 2023-12-07 DIAGNOSIS — B96.89 BACTERIAL SINUSITIS: ICD-10-CM

## 2023-12-07 LAB
CTP QC/QA: YES
CTP QC/QA: YES
POC MOLECULAR INFLUENZA A AGN: NEGATIVE
POC MOLECULAR INFLUENZA B AGN: POSITIVE
SARS-COV-2 AG RESP QL IA.RAPID: NEGATIVE

## 2023-12-07 PROCEDURE — 99213 PR OFFICE/OUTPT VISIT, EST, LEVL III, 20-29 MIN: ICD-10-PCS | Mod: S$GLB,,, | Performed by: NURSE PRACTITIONER

## 2023-12-07 PROCEDURE — 87811 SARS CORONAVIRUS 2 ANTIGEN POCT, MANUAL READ: ICD-10-PCS | Mod: QW,S$GLB,, | Performed by: NURSE PRACTITIONER

## 2023-12-07 PROCEDURE — 87502 POCT INFLUENZA A/B MOLECULAR: ICD-10-PCS | Mod: QW,S$GLB,, | Performed by: NURSE PRACTITIONER

## 2023-12-07 PROCEDURE — 87811 SARS-COV-2 COVID19 W/OPTIC: CPT | Mod: QW,S$GLB,, | Performed by: NURSE PRACTITIONER

## 2023-12-07 PROCEDURE — 99213 OFFICE O/P EST LOW 20 MIN: CPT | Mod: S$GLB,,, | Performed by: NURSE PRACTITIONER

## 2023-12-07 PROCEDURE — 87502 INFLUENZA DNA AMP PROBE: CPT | Mod: QW,S$GLB,, | Performed by: NURSE PRACTITIONER

## 2023-12-07 RX ORDER — BENZONATATE 200 MG/1
200 CAPSULE ORAL EVERY 8 HOURS PRN
Qty: 30 CAPSULE | Refills: 0 | Status: SHIPPED | OUTPATIENT
Start: 2023-12-07 | End: 2024-02-08

## 2023-12-07 RX ORDER — AZITHROMYCIN 250 MG/1
TABLET, FILM COATED ORAL
Qty: 6 TABLET | Refills: 0 | Status: SHIPPED | OUTPATIENT
Start: 2023-12-07 | End: 2023-12-12

## 2023-12-07 RX ORDER — PROMETHAZINE HYDROCHLORIDE AND DEXTROMETHORPHAN HYDROBROMIDE 6.25; 15 MG/5ML; MG/5ML
5 SYRUP ORAL NIGHTLY PRN
Qty: 118 ML | Refills: 0 | Status: SHIPPED | OUTPATIENT
Start: 2023-12-07 | End: 2024-02-08

## 2023-12-07 NOTE — LETTER
December 7, 2023      [facility] [place]  [address]  [facility] [address]  Phone: [phone]  Fax: [phone]       Patient: Laura Fitzpatrick   YOB: 1980  Date of Visit: 12/07/2023    To Whom It May Concern:    Saulo Fitzpatrick  was at [facility] on 12/07/2023. The patient may return to work/school on 12/11/2023 with no restrictions. If you have any questions or concerns, or if I can be of further assistance, please do not hesitate to contact me.    Sincerely,    Selin Guerrero, PASTORA-BC

## 2023-12-07 NOTE — PROGRESS NOTES
Subjective:      Patient ID: Laura Fitzpatrick is a 43 y.o. female.    Vitals:  weight is 91.8 kg (202 lb 6.1 oz). Her oral temperature is 98 °F (36.7 °C). Her blood pressure is 133/88 and her pulse is 82. Her respiration is 17 and oxygen saturation is 98%.     Chief Complaint: Nasal Congestion (Entered by patient) and Cough    44 y/o female presents to  today with c/o cough and congestion x1 week. Denies fever, chest pain, sob, wheeze, n/v/d.       Cough  The current episode started in the past 7 days. The problem has been gradually worsening. The problem occurs constantly. The cough is Productive of sputum. Associated symptoms include headaches, nasal congestion, postnasal drip and a sore throat. Pertinent negatives include no fever, shortness of breath or wheezing. Nothing aggravates the symptoms. She has tried OTC cough suppressant for the symptoms.       Constitution: Positive for fatigue. Negative for fever.   HENT:  Positive for congestion, postnasal drip, sinus pressure and sore throat.    Respiratory:  Positive for cough. Negative for shortness of breath and wheezing.    Gastrointestinal:  Negative for vomiting and diarrhea.   Neurological:  Positive for headaches.      Objective:     Physical Exam   Constitutional: She is oriented to person, place, and time. She appears well-developed. She is cooperative.  Non-toxic appearance. She does not appear ill. No distress.   HENT:   Head: Normocephalic.   Ears:   Right Ear: Hearing, tympanic membrane, external ear and ear canal normal.   Left Ear: Hearing, tympanic membrane, external ear and ear canal normal.   Nose: Congestion present. No mucosal edema, rhinorrhea or nasal deformity. No epistaxis. Right sinus exhibits no maxillary sinus tenderness and no frontal sinus tenderness. Left sinus exhibits no maxillary sinus tenderness and no frontal sinus tenderness.   Mouth/Throat: Uvula is midline and mucous membranes are normal. Mucous membranes are moist. No  trismus in the jaw. Normal dentition. No uvula swelling. Posterior oropharyngeal erythema present. No oropharyngeal exudate or posterior oropharyngeal edema. Oropharynx is clear.   Eyes: Conjunctivae and lids are normal. No scleral icterus.   Neck: Trachea normal and phonation normal. Neck supple. No edema present. No erythema present. No neck rigidity present.   Cardiovascular: Normal rate and regular rhythm.   Pulmonary/Chest: Effort normal and breath sounds normal. No respiratory distress. She has no decreased breath sounds. She has no wheezes. She has no rhonchi.   Abdominal: Normal appearance.   Musculoskeletal: Normal range of motion.         General: No deformity. Normal range of motion.   Neurological: She is alert and oriented to person, place, and time. She exhibits normal muscle tone. Coordination normal.   Skin: Skin is warm, dry, intact, not diaphoretic and not pale.   Psychiatric: Her speech is normal and behavior is normal. Judgment and thought content normal.   Nursing note and vitals reviewed.    Results for orders placed or performed in visit on 12/07/23   SARS Coronavirus 2 Antigen, POCT Manual Read   Result Value Ref Range    SARS Coronavirus 2 Antigen Negative Negative     Acceptable Yes    POCT Influenza A/B MOLECULAR   Result Value Ref Range    POC Molecular Influenza A Ag Negative Negative, Not Reported    POC Molecular Influenza B Ag Positive (A) Negative, Not Reported     Acceptable Yes         Assessment:     1. Influenza B    2. Bacterial sinusitis        Plan:   Pt is past the recommended time-frame for tamiflu; will treat for sinus infection at this time, as symptoms are prolonged.     Influenza B  Comments:  >10 days  Orders:  -     SARS Coronavirus 2 Antigen, POCT Manual Read  -     POCT Influenza A/B MOLECULAR  -     benzonatate (TESSALON) 200 MG capsule; Take 1 capsule (200 mg total) by mouth every 8 (eight) hours as needed for Cough.  Dispense: 30  capsule; Refill: 0  -     promethazine-dextromethorphan (PROMETHAZINE-DM) 6.25-15 mg/5 mL Syrp; Take 5 mLs by mouth nightly as needed (cough).  Dispense: 118 mL; Refill: 0    Bacterial sinusitis  -     SARS Coronavirus 2 Antigen, POCT Manual Read  -     POCT Influenza A/B MOLECULAR  -     azithromycin (Z-JOHANA) 250 MG tablet; Take 2 tablets by mouth on day 1; Take 1 tablet by mouth on days 2-5  Dispense: 6 tablet; Refill: 0  -     benzonatate (TESSALON) 200 MG capsule; Take 1 capsule (200 mg total) by mouth every 8 (eight) hours as needed for Cough.  Dispense: 30 capsule; Refill: 0  -     promethazine-dextromethorphan (PROMETHAZINE-DM) 6.25-15 mg/5 mL Syrp; Take 5 mLs by mouth nightly as needed (cough).  Dispense: 118 mL; Refill: 0

## 2024-01-19 ENCOUNTER — PATIENT MESSAGE (OUTPATIENT)
Dept: PSYCHIATRY | Facility: CLINIC | Age: 44
End: 2024-01-19
Payer: COMMERCIAL

## 2024-01-23 DIAGNOSIS — F33.1 MDD (MAJOR DEPRESSIVE DISORDER), RECURRENT EPISODE, MODERATE: ICD-10-CM

## 2024-01-23 RX ORDER — ARIPIPRAZOLE 5 MG/1
5 TABLET ORAL DAILY
Qty: 30 TABLET | Refills: 0 | Status: SHIPPED | OUTPATIENT
Start: 2024-01-23 | End: 2024-02-08 | Stop reason: SDUPTHER

## 2024-02-05 ENCOUNTER — PATIENT MESSAGE (OUTPATIENT)
Dept: PSYCHIATRY | Facility: CLINIC | Age: 44
End: 2024-02-05
Payer: COMMERCIAL

## 2024-02-08 ENCOUNTER — OFFICE VISIT (OUTPATIENT)
Dept: PSYCHIATRY | Facility: CLINIC | Age: 44
End: 2024-02-08
Payer: COMMERCIAL

## 2024-02-08 DIAGNOSIS — F41.1 GENERALIZED ANXIETY DISORDER WITH PANIC ATTACKS: ICD-10-CM

## 2024-02-08 DIAGNOSIS — F51.04 PSYCHOPHYSIOLOGICAL INSOMNIA: ICD-10-CM

## 2024-02-08 DIAGNOSIS — F41.0 GENERALIZED ANXIETY DISORDER WITH PANIC ATTACKS: ICD-10-CM

## 2024-02-08 DIAGNOSIS — F33.1 MDD (MAJOR DEPRESSIVE DISORDER), RECURRENT EPISODE, MODERATE: Primary | ICD-10-CM

## 2024-02-08 PROCEDURE — 1160F RVW MEDS BY RX/DR IN RCRD: CPT | Mod: CPTII,95,, | Performed by: NURSE PRACTITIONER

## 2024-02-08 PROCEDURE — 99214 OFFICE O/P EST MOD 30 MIN: CPT | Mod: 95,,, | Performed by: NURSE PRACTITIONER

## 2024-02-08 PROCEDURE — 1159F MED LIST DOCD IN RCRD: CPT | Mod: CPTII,95,, | Performed by: NURSE PRACTITIONER

## 2024-02-08 RX ORDER — ARIPIPRAZOLE 2 MG/1
2 TABLET ORAL DAILY
Qty: 7 TABLET | Refills: 0 | Status: SHIPPED | OUTPATIENT
Start: 2024-02-08 | End: 2024-02-21

## 2024-02-08 NOTE — PROGRESS NOTES
"2/8/2024   Laura Fitzpatrick  1980  [id number]    Outpatient Psychiatry Follow-Up Visit (MD/NP) - Telemedicine Visit      The patient location is: Patient reported that their location at the time of this visit was in the [facility]       Visit type: audiovisual    Each patient to whom he or she provides medical services by telemedicine is:  (1) informed of the relationship between the physician and patient and the respective role of any other health care provider with respect to management of the patient; and (2) notified that he or she may decline to receive medical services by telemedicine and may withdraw from such care at any time.        Chief Complaint:  Laura Fitzpatrick, a 43 y.o. female,who presents today for follow up of depression and anxiety.      Interval History/Subjective Report/Content of Current Session:     States she has no motivation and is sleeping all the time. Feels "blah". Endorses anhedonia and increased anxiety. Work has been very stressful. They are short staffed.   She states she would like to wean off both of her psych meds and try to go without taking any psych meds to see how she does. Discussed the need to slowly wean off Effexor XR.    ASE of psych meds: denies    Pt denies recurrent thoughts of death and denies any suicidal or homicidal plans or intentions.     Denies any sxs of kacie or hypomania. Denies AVH, paranoia and delusions. No objective s/sx of psychosis, kacie, or hypomania.         Psychotherapy:  Target symptoms: depression, anxiety   Why chosen therapy is appropriate versus another modality: relevant to diagnosis, patient responds to this modality  Outcome monitoring methods: self-report, observation  Therapeutic intervention type: supportive psychotherapy  Topics discussed/themes: work stress, building skills sets for symptom management  The patient's response to the intervention is accepting. The patient's progress toward treatment goals is good, fair. "   Duration of intervention: 5 minutes.      Psychotropic medication review  Previous Trials-  Wellbutrin - severe anxiety  Zoloft  Melatonin - weird dreams  Cymbalta      Current meds-  Effexor XR  Abilify        Review of Systems       Review of Systems   Constitutional:  Negative for chills, fever and malaise/fatigue.   Respiratory:  Negative for cough and shortness of breath.    Cardiovascular:  Negative for chest pain and palpitations.   Gastrointestinal:  Negative for abdominal pain, diarrhea and vomiting.   Genitourinary:  Negative for dysuria and hematuria.   Musculoskeletal:  Negative for falls and myalgias.   Skin:  Negative for rash.   Neurological:  Negative for tremors, seizures and headaches.   Psychiatric/Behavioral:          See HPI         Past Medical, Family and Social History: The patient's past medical, family and social history, allergies, current medications, past surgical history, and problem list have been reviewed and updated as appropriate within the electronic medical record.      Compliance: yes    Risk Parameters:  Patient reports no suicidal ideation  Patient reports no homicidal ideation  Patient reports no self-injurious behavior  Patient reports no violent behavior    Exam (detailed: at least 9 elements; comprehensive: all 15 elements)   Constitutional  Vitals:  Most recent vital signs, dated less than 90 days prior to this appointment, were reviewed.   There were no vitals filed for this visit.           Musculoskeletal  Muscle Strength/Tone:  not examined - CLAUDIA due to virtual visit   Gait & Station:  CLAUDIA due to virtual visit     Psychiatric      Appearance:  unremarkable, age appropriate, well nourished, casually dressed, neatly groomed, obese   Behavior:  normal, friendly and cooperative, eye contact normal     Speech:  no latency; no press   Mood & Affect:  euthymic  congruent and appropriate   Thought Process:  normal and logical   Associations:  intact   Thought Content:   normal, no suicidality, no homicidality, delusions, or paranoia   Insight:  intact, has awareness of illness   Judgement: behavior is adequate to circumstances, age appropriate   Orientation:  grossly intact   Memory: intact for content of interview   Language: grossly intact   Attention Span & Concentration:  able to focus   Fund of Knowledge:  intact and appropriate to age and level of education     Medications:  Outpatient Encounter Medications as of 2/8/2024   Medication Sig Dispense Refill    ARIPiprazole (ABILIFY) 2 MG Tab Take 1 tablet (2 mg total) by mouth once daily. for 7 days 7 tablet 0    multivitamin (THERAGRAN) per tablet Take 1 tablet by mouth once daily.      venlafaxine (EFFEXOR-XR) 150 MG Cp24 Take 1 capsule (150 mg total) by mouth once daily. 30 capsule 1    [DISCONTINUED] ARIPiprazole (ABILIFY) 5 MG Tab Take 1 tablet (5 mg total) by mouth once daily. 30 tablet 0    [DISCONTINUED] benzonatate (TESSALON) 200 MG capsule Take 1 capsule (200 mg total) by mouth every 8 (eight) hours as needed for Cough. 30 capsule 0    [DISCONTINUED] promethazine-dextromethorphan (PROMETHAZINE-DM) 6.25-15 mg/5 mL Syrp Take 5 mLs by mouth nightly as needed (cough). 118 mL 0     No facility-administered encounter medications on file as of 2/8/2024.       Allergy:  Review of patient's allergies indicates:   Allergen Reactions    Wellbutrin [bupropion hcl] Anxiety         Assessment and Diagnosis   Status/Progress: Based on the examination today, the patient's problem(s) is/are inadequately controlled and treatment resistant.  New problems have not been presented today.   Co-morbidities are not complicating management of the primary condition.  There are no active rule-out diagnoses for this patient at this time.         General Impression:       ICD-10-CM ICD-9-CM   1. MDD (major depressive disorder), recurrent episode, moderate  F33.1 296.32   2. Generalized anxiety disorder with panic attacks  F41.1 300.02    F41.0  300.01   3. Psychophysiological insomnia  F51.04 307.42       Intervention/Counseling/Treatment Plan     Medication Management:  Continue Effexor  mg q am  Decrease Abilify to 2 mg q day x 1 week then discontinue  Labs: reviewed most recent  The treatment plan and follow up plan were reviewed with the patient.  Discussed with patient informed consent, risks vs. benefits, alternative treatments, side effect profile and the inherent unpredictability of individual responses to treatments and all medications prescribed. The patient expresses understanding of the above and displays the capacity to agree with this current plan and had no other questions.  Encouraged Patient to keep future appointments.   Take medications as prescribed and abstain from substance abuse.   Pt was told to present to ED or call 911 for SI/HI plan or intent, psychosis, or other psychiatric or medical emergency, and pt agrees to this and verbalized understanding.        Return to Clinic: 2 weeks      Face-to-face time with patient:  16 minutes  Total time:  20 minutes of total time spent on the encounter, which includes face to face time and non-face to face time preparing to see the patient (eg, review of tests), Obtaining and/or reviewing separately obtained history, Documenting clinical information in the electronic or other health record, Independently interpreting results (not separately reported) and communicating results to the patient/family/caregiver, or Care coordination (not separately reported).       Barb Bah, MSN, APRN, PMHNP-BC [facility]

## 2024-02-21 ENCOUNTER — OFFICE VISIT (OUTPATIENT)
Dept: PSYCHIATRY | Facility: CLINIC | Age: 44
End: 2024-02-21
Payer: COMMERCIAL

## 2024-02-21 DIAGNOSIS — F41.1 GENERALIZED ANXIETY DISORDER WITH PANIC ATTACKS: ICD-10-CM

## 2024-02-21 DIAGNOSIS — F41.0 GENERALIZED ANXIETY DISORDER WITH PANIC ATTACKS: ICD-10-CM

## 2024-02-21 DIAGNOSIS — F33.1 MDD (MAJOR DEPRESSIVE DISORDER), RECURRENT EPISODE, MODERATE: Primary | ICD-10-CM

## 2024-02-21 DIAGNOSIS — F51.04 PSYCHOPHYSIOLOGICAL INSOMNIA: ICD-10-CM

## 2024-02-21 PROCEDURE — 1159F MED LIST DOCD IN RCRD: CPT | Mod: CPTII,95,, | Performed by: NURSE PRACTITIONER

## 2024-02-21 PROCEDURE — 1160F RVW MEDS BY RX/DR IN RCRD: CPT | Mod: CPTII,95,, | Performed by: NURSE PRACTITIONER

## 2024-02-21 PROCEDURE — 99214 OFFICE O/P EST MOD 30 MIN: CPT | Mod: 95,,, | Performed by: NURSE PRACTITIONER

## 2024-02-21 RX ORDER — VENLAFAXINE HYDROCHLORIDE 75 MG/1
75 CAPSULE, EXTENDED RELEASE ORAL DAILY
Qty: 30 CAPSULE | Refills: 0 | Status: SHIPPED | OUTPATIENT
Start: 2024-02-21 | End: 2024-03-12

## 2024-02-21 NOTE — PROGRESS NOTES
2/21/2024   Laura Fitzpatrick  1980  [id number]    Outpatient Psychiatry Follow-Up Visit (MD/NP) - Telemedicine Visit      The patient location is: Patient reported that their location at the time of this visit was in the [facility]       Visit type: audiovisual    Each patient to whom he or she provides medical services by telemedicine is:  (1) informed of the relationship between the physician and patient and the respective role of any other health care provider with respect to management of the patient; and (2) notified that he or she may decline to receive medical services by telemedicine and may withdraw from such care at any time.        Chief Complaint:  Laura Fitzpatrick, a 43 y.o. female,who presents today for follow up of depression and anxiety.      Interval History/Subjective Report/Content of Current Session:     States she continues to have low motivation and is sleeping all the time. Continues to endorse depressed mood, anhedonia and increased anxiety. States she has not noticed any improvement or worsening in sxs since stopping Abilify. States she feels her sxs of depression have worsened since being on antidepressants and she is not interested in taking any psych meds at this time. Discussed risks vs benefits of antidepressant medications.  She states she has a lot of family support. Her brother and his GF live next door to the pt and they are aware that the pt is having a difficult time.   She is planning to go [employer] night at a [employer] this Friday with her dad. States they attend trivia night once a month.  She still would like to wean off Effexor XR and she is not interested in taking any other antidepressants at this time. Discussed the need to slowly wean off Effexor XR.  We discussed scheduling an appt for psychotherapy. Discussed the STeP program and the [facility] clinic. She is interested in the STeP program. Will refer.    ASE of psych meds: denies    Pt denies recurrent  thoughts of death and denies any suicidal or homicidal plans or intentions.     Denies any sxs of kacie or hypomania. Denies AVH, paranoia and delusions. No objective s/sx of psychosis, kacie, or hypomania.         Psychotherapy:  Target symptoms: depression, anxiety   Why chosen therapy is appropriate versus another modality: relevant to diagnosis, patient responds to this modality  Outcome monitoring methods: self-report, observation  Therapeutic intervention type: supportive psychotherapy  Topics discussed/themes: building skills sets for symptom management  The patient's response to the intervention is accepting. The patient's progress toward treatment goals is fair.   Duration of intervention: 4 minutes.      Psychotropic medication review  Previous Trials-  Wellbutrin - severe anxiety  Zoloft  Melatonin - weird dreams  Cymbalta  Abilify    Current meds-  Effexor XR          Review of Systems       Review of Systems   Constitutional:  Negative for chills, fever and malaise/fatigue.   Respiratory:  Negative for cough and shortness of breath.    Cardiovascular:  Negative for chest pain and palpitations.   Gastrointestinal:  Negative for abdominal pain, diarrhea and vomiting.   Genitourinary:  Negative for dysuria and hematuria.   Musculoskeletal:  Negative for falls and myalgias.   Skin:  Negative for rash.   Neurological:  Negative for tremors, seizures and headaches.   Psychiatric/Behavioral:          See HPI         Past Medical, Family and Social History: The patient's past medical, family and social history, allergies, current medications, past surgical history, and problem list have been reviewed and updated as appropriate within the electronic medical record.      Compliance: yes    Risk Parameters:  Patient reports no suicidal ideation  Patient reports no homicidal ideation  Patient reports no self-injurious behavior  Patient reports no violent behavior    Exam (detailed: at least 9 elements;  comprehensive: all 15 elements)   Constitutional  Vitals:  Most recent vital signs, dated less than 90 days prior to this appointment, were reviewed.   There were no vitals filed for this visit.           Musculoskeletal  Muscle Strength/Tone:  not examined - CLAUDIA due to virtual visit   Gait & Station:  CLAUDIA due to virtual visit     Psychiatric      Appearance:  unremarkable, age appropriate, well nourished, casually dressed, neatly groomed, obese   Behavior:  normal, friendly and cooperative, eye contact normal     Speech:  no latency; no press   Mood & Affect:  dysthymic  congruent and appropriate   Thought Process:  normal and logical   Associations:  intact   Thought Content:  normal, no suicidality, no homicidality, delusions, or paranoia   Insight:  intact, has awareness of illness   Judgement: behavior is adequate to circumstances, age appropriate   Orientation:  grossly intact   Memory: intact for content of interview   Language: grossly intact   Attention Span & Concentration:  able to focus   Fund of Knowledge:  intact and appropriate to age and level of education     Medications:  Outpatient Encounter Medications as of 2/21/2024   Medication Sig Dispense Refill    multivitamin (THERAGRAN) per tablet Take 1 tablet by mouth once daily.      venlafaxine (EFFEXOR-XR) 75 MG 24 hr capsule Take 1 capsule (75 mg total) by mouth once daily. 30 capsule 0    [DISCONTINUED] ARIPiprazole (ABILIFY) 2 MG Tab Take 1 tablet (2 mg total) by mouth once daily. for 7 days 7 tablet 0    [DISCONTINUED] venlafaxine (EFFEXOR-XR) 150 MG Cp24 Take 1 capsule (150 mg total) by mouth once daily. 30 capsule 1     No facility-administered encounter medications on file as of 2/21/2024.       Allergy:  Review of patient's allergies indicates:   Allergen Reactions    Wellbutrin [bupropion hcl] Anxiety         Assessment and Diagnosis   Status/Progress: Based on the examination today, the patient's problem(s) is/are inadequately controlled  and treatment resistant.  New problems have not been presented today.   Co-morbidities are not complicating management of the primary condition.  There are no active rule-out diagnoses for this patient at this time.         General Impression:       ICD-10-CM ICD-9-CM   1. MDD (major depressive disorder), recurrent episode, moderate  F33.1 296.32   2. Generalized anxiety disorder with panic attacks  F41.1 300.02    F41.0 300.01   3. Psychophysiological insomnia  F51.04 307.42       Intervention/Counseling/Treatment Plan     Medication Management:  Decrease Effexor XR to 75 mg q am  Refer to STeP program  Labs: reviewed most recent  The treatment plan and follow up plan were reviewed with the patient.  Discussed with patient informed consent, risks vs. benefits, alternative treatments, side effect profile and the inherent unpredictability of individual responses to treatments and all medications prescribed. The patient expresses understanding of the above and displays the capacity to agree with this current plan and had no other questions.  Encouraged Patient to keep future appointments.   Take medications as prescribed and abstain from substance abuse.   Pt was told to present to ED or call 911 for SI/HI plan or intent, psychosis, or other psychiatric or medical emergency, and pt agrees to this and verbalized understanding.        Return to Clinic: 2 weeks      Face-to-face time with patient:  17 minutes  Total time:  22 minutes of total time spent on the encounter, which includes face to face time and non-face to face time preparing to see the patient (eg, review of tests), Obtaining and/or reviewing separately obtained history, Documenting clinical information in the electronic or other health record, Independently interpreting results (not separately reported) and communicating results to the patient/family/caregiver, or Care coordination (not separately reported).       Barb Bah, MSN, APRN,  PMHNP-BC [facility]

## 2024-02-29 ENCOUNTER — PATIENT MESSAGE (OUTPATIENT)
Dept: PSYCHIATRY | Facility: CLINIC | Age: 44
End: 2024-02-29
Payer: COMMERCIAL

## 2024-03-12 ENCOUNTER — OFFICE VISIT (OUTPATIENT)
Dept: PSYCHIATRY | Facility: CLINIC | Age: 44
End: 2024-03-12
Payer: COMMERCIAL

## 2024-03-12 DIAGNOSIS — F41.0 GENERALIZED ANXIETY DISORDER WITH PANIC ATTACKS: ICD-10-CM

## 2024-03-12 DIAGNOSIS — F33.1 MDD (MAJOR DEPRESSIVE DISORDER), RECURRENT EPISODE, MODERATE: Primary | ICD-10-CM

## 2024-03-12 DIAGNOSIS — F51.04 PSYCHOPHYSIOLOGICAL INSOMNIA: ICD-10-CM

## 2024-03-12 DIAGNOSIS — F41.1 GENERALIZED ANXIETY DISORDER WITH PANIC ATTACKS: ICD-10-CM

## 2024-03-12 PROCEDURE — 1159F MED LIST DOCD IN RCRD: CPT | Mod: CPTII,95,, | Performed by: NURSE PRACTITIONER

## 2024-03-12 PROCEDURE — 99214 OFFICE O/P EST MOD 30 MIN: CPT | Mod: 95,,, | Performed by: NURSE PRACTITIONER

## 2024-03-12 PROCEDURE — 1160F RVW MEDS BY RX/DR IN RCRD: CPT | Mod: CPTII,95,, | Performed by: NURSE PRACTITIONER

## 2024-03-12 RX ORDER — VENLAFAXINE HYDROCHLORIDE 37.5 MG/1
37.5 CAPSULE, EXTENDED RELEASE ORAL DAILY
Qty: 30 CAPSULE | Refills: 0 | Status: SHIPPED | OUTPATIENT
Start: 2024-03-12 | End: 2024-04-02

## 2024-03-12 NOTE — PROGRESS NOTES
3/12/2024   Laura Fitzpatrick  1980  [id number]    Outpatient Psychiatry Follow-Up Visit (MD/NP) - Telemedicine Visit      The patient location is: Patient reported that their location at the time of this visit was in the [facility]       Visit type: audiovisual    Each patient to whom he or she provides medical services by telemedicine is:  (1) informed of the relationship between the physician and patient and the respective role of any other health care provider with respect to management of the patient; and (2) notified that he or she may decline to receive medical services by telemedicine and may withdraw from such care at any time.        Chief Complaint:  Laura Fitzpatrick, a 43 y.o. female,who presents today for follow up of depression and anxiety.      Interval History/Subjective Report/Content of Current Session:     States she is feeling much better than she did at the last visit. Has more energy and is smiling more and doing more with others. No longer sleeping all the time. Sleeping well. Mood has improved. Denies anhedonia. Has her first STeP appt this coming Monday. Anxiety is slightly increased but is manageable. She states she would like to continue to wean off of her Effexor XR. Will wean down to 37.5 mg today. States she is not interested in being on any psych meds.    ASE of psych meds: denies    Pt denies recurrent thoughts of death and denies any suicidal or homicidal plans or intentions.     Denies any sxs of kacie or hypomania. Denies AVH, paranoia and delusions. No objective s/sx of psychosis, kacie, or hypomania.         Psychotherapy:  Target symptoms: depression, anxiety   Why chosen therapy is appropriate versus another modality: relevant to diagnosis, patient responds to this modality  Outcome monitoring methods: self-report, observation  Therapeutic intervention type: supportive psychotherapy  Topics discussed/themes: building skills sets for symptom management  The  patient's response to the intervention is accepting. The patient's progress toward treatment goals is good.   Duration of intervention: 4 minutes.      Psychotropic medication review  Previous Trials-  Wellbutrin - severe anxiety  Zoloft  Melatonin - weird dreams  Cymbalta  Abilify    Current meds-  Effexor XR          Review of Systems       Review of Systems   Constitutional:  Negative for chills, fever and malaise/fatigue.   Respiratory:  Negative for cough and shortness of breath.    Cardiovascular:  Negative for chest pain and palpitations.   Gastrointestinal:  Negative for abdominal pain, diarrhea and vomiting.   Genitourinary:  Negative for dysuria and hematuria.   Musculoskeletal:  Negative for falls and myalgias.   Skin:  Negative for rash.   Neurological:  Negative for tremors, seizures and headaches.   Psychiatric/Behavioral:          See HPI         Past Medical, Family and Social History: The patient's past medical, family and social history, allergies, current medications, past surgical history, and problem list have been reviewed and updated as appropriate within the electronic medical record.      Compliance: yes    Risk Parameters:  Patient reports no suicidal ideation  Patient reports no homicidal ideation  Patient reports no self-injurious behavior  Patient reports no violent behavior    Exam (detailed: at least 9 elements; comprehensive: all 15 elements)   Constitutional  Vitals:  Most recent vital signs, dated less than 90 days prior to this appointment, were reviewed.   There were no vitals filed for this visit.           Musculoskeletal  Muscle Strength/Tone:  not examined - CLAUDIA due to virtual visit   Gait & Station:  CLAUDIA due to virtual visit     Psychiatric      Appearance:  unremarkable, age appropriate, well nourished, casually dressed, neatly groomed, obese   Behavior:  normal, friendly and cooperative, eye contact normal     Speech:  no latency; no press   Mood & Affect:   euthymic  congruent and appropriate   Thought Process:  normal and logical   Associations:  intact   Thought Content:  normal, no suicidality, no homicidality, delusions, or paranoia   Insight:  intact, has awareness of illness   Judgement: behavior is adequate to circumstances, age appropriate   Orientation:  grossly intact   Memory: intact for content of interview   Language: grossly intact   Attention Span & Concentration:  able to focus   Fund of Knowledge:  intact and appropriate to age and level of education     Medications:  Outpatient Encounter Medications as of 3/12/2024   Medication Sig Dispense Refill    multivitamin (THERAGRAN) per tablet Take 1 tablet by mouth once daily.      venlafaxine (EFFEXOR-XR) 75 MG 24 hr capsule Take 1 capsule (75 mg total) by mouth once daily. 30 capsule 0     No facility-administered encounter medications on file as of 3/12/2024.       Allergy:  Review of patient's allergies indicates:   Allergen Reactions    Wellbutrin [bupropion hcl] Anxiety         Assessment and Diagnosis   Status/Progress: Based on the examination today, the patient's problem(s) is/are improved and well controlled.  New problems have not been presented today.   Co-morbidities are not complicating management of the primary condition.  There are no active rule-out diagnoses for this patient at this time.         General Impression:       ICD-10-CM ICD-9-CM   1. MDD (major depressive disorder), recurrent episode, moderate  F33.1 296.32   2. Generalized anxiety disorder with panic attacks  F41.1 300.02    F41.0 300.01   3. Psychophysiological insomnia  F51.04 307.42       Intervention/Counseling/Treatment Plan     Medication Management:  Decrease Effexor XR to 37.5 mg q am  Keep STeP appt  Labs: reviewed most recent  The treatment plan and follow up plan were reviewed with the patient.  Discussed with patient informed consent, risks vs. benefits, alternative treatments, side effect profile and the inherent  unpredictability of individual responses to treatments and all medications prescribed. The patient expresses understanding of the above and displays the capacity to agree with this current plan and had no other questions.  Encouraged Patient to keep future appointments.   Take medications as prescribed and abstain from substance abuse.   Pt was told to present to ED or call 911 for SI/HI plan or intent, psychosis, or other psychiatric or medical emergency, and pt agrees to this and verbalized understanding.        Return to Clinic: 6 weeks      Face-to-face time with patient:  22 minutes  Total time:  27 minutes of total time spent on the encounter, which includes face to face time and non-face to face time preparing to see the patient (eg, review of tests), Obtaining and/or reviewing separately obtained history, Documenting clinical information in the electronic or other health record, Independently interpreting results (not separately reported) and communicating results to the patient/family/caregiver, or Care coordination (not separately reported).       Barb Bah, MSN, APRN, PMHNP-BC  [facility]

## 2024-03-18 ENCOUNTER — OFFICE VISIT (OUTPATIENT)
Dept: PSYCHIATRY | Facility: CLINIC | Age: 44
End: 2024-03-18
Payer: COMMERCIAL

## 2024-03-18 DIAGNOSIS — F41.1 GENERALIZED ANXIETY DISORDER WITH PANIC ATTACKS: ICD-10-CM

## 2024-03-18 DIAGNOSIS — F41.0 GENERALIZED ANXIETY DISORDER WITH PANIC ATTACKS: ICD-10-CM

## 2024-03-18 DIAGNOSIS — F33.1 MDD (MAJOR DEPRESSIVE DISORDER), RECURRENT EPISODE, MODERATE: Primary | ICD-10-CM

## 2024-03-18 PROCEDURE — 90837 PSYTX W PT 60 MINUTES: CPT | Mod: S$GLB,,, | Performed by: SOCIAL WORKER

## 2024-03-18 NOTE — PROGRESS NOTES
"Psychiatry Initial Visit (PhD/LCSW)  Diagnostic Interview - CPT 82469    Date: 3/18/2024    Site: [facility]    Referral source: [facility]    Clinical status of patient: Outpatient    Laura Fitzpatrick, a 43 y.o. female, for initial evaluation visit.  Met with patient.    Chief complaint/reason for encounter: depression and anxiety    History of present illness:   "I have been struggling with a lot of anxiety lately. "We have been trying different medications but they dont seem to be working.  States that she has always been anxious even with things from her childhood, but things have been "manageable."   States that Wellbutrin caused more anxiety in her 20s, where she quit her job.   She states that she is easily irritable and anxious to the point of not being able to complete a task, "I procrastinate about tasks because I am anxious about getting things done at work, she is assigned to two different schools right now. 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     The patient states that she is "pretty exhausted" most days. States that she is working two jobs to make ends meet.   "It is a daily castillo to even get up and go to work."    Pain: 0    Symptoms:   Mood: depressed mood, diminished interest, and tearfulness  Anxiety: excessive anxiety/worry, restlessness/keyed up, and irritability  Substance abuse: denied  Cognitive functioning: denied  Health behaviors: noncontributory    Psychiatric history: has participated in counseling/psychotherapy on an outpatient basis in the past and currently under psychiatric care    Medical history: none    Family history of psychiatric illness:  Mom has depression and anxiety with multiple issues  and "in an out of [employer].    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  Currently working two jobs to make " "ends meet, "so I don't have as much free time as I used to have."  She is also working at [employer] a couple of nights per week.     -She is currently living in the double of her brother and sister in law, feels bad because she knows that they could get more money if they rented it to someone else.     Never , no kids.     Has friends through work, "we commiserate."    Problems:   Work Stress  Financial Stress.   Intimate Relationships- ended a relationship 2 years ago after finding out he cheated on her the entire time- states that he still sends her emails and always found her. States that he often falls for him again and is not happy about it. States that she has known him since she was in her early 20s and "in and out of a relationship for a long time."      Identified Goals for each Identified Problems:    Possibly changing schools for the next school year to decrease demand on her- "I feel like I am doing the job of two people."  States that the expectation of her to train is also stressful. Training gives her no additional pay and prevents her from completing her paperwork.  She loves that she is able to help disadvantaged kids. Wants to feel empowered to have the conversation with her boss.   Wants to be able to save some of the money she makes over the summers. Wants to potentially pay down debt and save for a house, states that she has a lot of credit card debt. Suggested [employer]. Work on budgeting issues.   "I need to find a way to not talk to him anymore." States that she has not had any success on online dating platforms. Wants to learn healthy boundaries.     Substance use:   Alcohol: none   Drugs: none   Tobacco: none   Caffeine: none    Current medications and drug reactions (include OTC, herbal): see medication list     Strengths and liabilities: Strength: Patient accepts guidance/feedback, Strength: Patient is expressive/articulate., Strength: Patient is " intelligent., Strength: Patient is motivated for change.    Current Evaluation:     Mental Status Exam:  General Appearance:  unremarkable, age appropriate   Speech: normal tone, normal rate, normal pitch, normal volume      Level of Cooperation: cooperative      Thought Processes: normal and logical   Mood: anxious, depressed, sad      Thought Content: normal, no suicidality, no homicidality, delusions, or paranoia   Affect: congruent and appropriate   Orientation: Oriented x3   Memory: recent >  intact, remote >  intact   Attention Span & Concentration: intact   Fund of General Knowledge: intact and appropriate to age and level of education   Abstract Reasoning: interpretation of similarities was abstract   Judgment & Insight: fair     Language  intact     Diagnostic Impression - Plan:       ICD-10-CM ICD-9-CM   1. MDD (major depressive disorder), recurrent episode, moderate  F33.1 296.32   2. Generalized anxiety disorder with panic attacks  F41.1 300.02    F41.0 300.01       Plan:individual psychotherapy and medication management by physician    Return to Clinic: 1 week    Length of Service (minutes): 60

## 2024-03-28 ENCOUNTER — OFFICE VISIT (OUTPATIENT)
Dept: FAMILY MEDICINE | Facility: CLINIC | Age: 44
End: 2024-03-28
Payer: COMMERCIAL

## 2024-03-28 VITALS
OXYGEN SATURATION: 98 % | DIASTOLIC BLOOD PRESSURE: 96 MMHG | HEIGHT: 61 IN | SYSTOLIC BLOOD PRESSURE: 116 MMHG | HEART RATE: 107 BPM | WEIGHT: 208.13 LBS | BODY MASS INDEX: 39.3 KG/M2

## 2024-03-28 DIAGNOSIS — K92.0 HEMATEMESIS WITH NAUSEA: Primary | ICD-10-CM

## 2024-03-28 DIAGNOSIS — R03.0 ELEVATED BP WITHOUT DIAGNOSIS OF HYPERTENSION: ICD-10-CM

## 2024-03-28 DIAGNOSIS — R13.10 DYSPHAGIA, UNSPECIFIED TYPE: ICD-10-CM

## 2024-03-28 DIAGNOSIS — K21.9 GASTROESOPHAGEAL REFLUX DISEASE, UNSPECIFIED WHETHER ESOPHAGITIS PRESENT: ICD-10-CM

## 2024-03-28 PROCEDURE — 99214 OFFICE O/P EST MOD 30 MIN: CPT | Mod: S$GLB,,, | Performed by: FAMILY MEDICINE

## 2024-03-28 PROCEDURE — 1159F MED LIST DOCD IN RCRD: CPT | Mod: CPTII,S$GLB,, | Performed by: FAMILY MEDICINE

## 2024-03-28 PROCEDURE — 3080F DIAST BP >= 90 MM HG: CPT | Mod: CPTII,S$GLB,, | Performed by: FAMILY MEDICINE

## 2024-03-28 PROCEDURE — 99999 PR PBB SHADOW E&M-EST. PATIENT-LVL IV: CPT | Mod: PBBFAC,,, | Performed by: FAMILY MEDICINE

## 2024-03-28 PROCEDURE — 3074F SYST BP LT 130 MM HG: CPT | Mod: CPTII,S$GLB,, | Performed by: FAMILY MEDICINE

## 2024-03-28 PROCEDURE — 3008F BODY MASS INDEX DOCD: CPT | Mod: CPTII,S$GLB,, | Performed by: FAMILY MEDICINE

## 2024-03-28 RX ORDER — PANTOPRAZOLE SODIUM 40 MG/1
40 TABLET, DELAYED RELEASE ORAL DAILY
Qty: 30 TABLET | Refills: 3 | Status: SHIPPED | OUTPATIENT
Start: 2024-03-28 | End: 2025-03-28

## 2024-03-28 NOTE — PROGRESS NOTES
(Portions of this note were dictated using voice recognition software and may contain dictation related errors in spelling/grammar/syntax not found on text review)    CC:   Chief Complaint   Patient presents with    Heartburn     Pt stated she feel something is stock in her throat. She vomited blood Monday night     Chest Pain     Middle     Diarrhea     Pt stated some times       HPI: 43 y.o. female no primary care doctor presented today to discuss heartburn, chest pain and diarrhea as a new patient to me.  She has medical history significant for anxiety, cervical radiculopathy, depression, GERD, history of dysphagia, obesity.    Patient stated that she always has acid reflux and takes as needed medication for it for past several years, she has history of dysphagia, had endoscopy done in 2018 with biopsies which was normal.      She stated that symptoms have progressively worsened, she has been having more episodes of heartburn with feeling of something stuck in the throat in the last few weeks, on Monday she had an episode of vomiting with blood in it, states it was 1 table spoon of bright fresh red blood with few blood clots, she started taking Tums along with Gaviscon.  She denies having any recurrent episodes of bleeding in vomiting but had 1-2 episodes of yellow colored vomiting containing food particles since then, she has constant feeling of nausea and heartburn with epigastric discomfort.    She also reports difficulty in swallowing due to feeling of something stuck there.      She works 2 jobs, eat her main meal at 3:30 p.m. before going to the 2nd job, had light snack at 8:30 p.m. when she gets back to home and sleeps diet afterward, has episodes of heartburn in the middle of the night or early morning.  She has been trying to avoid spicy food, likes to have coffee,few drinks, but mainly in the morning.    Her BM are regular, had few episodes of loose stools in the last week but normal now, denies any  blood in stools.     She has no other symptoms or concerns.    Past Medical History:   Diagnosis Date    Abnormal Pap smear 12 years ago    Cryotherapy    Abnormal Pap smear of cervix     Allergy     Anxiety     Cervical radiculopathy 04/29/2015    Cervical radiculopathy 04/29/2015    Depression     Enlarged thyroid gland 01/26/2015    GERD (gastroesophageal reflux disease)     History of psychiatric hospitalization     Hx of psychiatric care     Obesity (BMI 30-39.9)     Psychiatric problem     Right-sided carotid artery disease 02/08/2016    Therapy        Past Surgical History:   Procedure Laterality Date    APPENDECTOMY  2011    ESOPHAGOGASTRODUODENOSCOPY N/A 9/20/2018    Procedure: EGD (ESOPHAGOGASTRODUODENOSCOPY);  Surgeon: Karl Judge MD;  Location: [facility] ([address] [place] [address]);  Service: Endoscopy;  Laterality: N/A;    INGUINAL HERNIA REPAIR Right 2017    MYRINGOTOMY W/ TUBES         Family History   Problem Relation Age of Onset    Heart disease Mother 40    Diabetes Mother     Depression Mother     Anxiety disorder Mother     Obesity Mother     Hypertension Father     Diabetes Father     Obesity Father     Kidney disease Father     Heart disease Maternal Grandmother     Diabetes Maternal Grandmother     Diabetes Maternal Grandfather     Cancer Paternal Grandmother 58        Breast    Diabetes Paternal Grandmother     Breast cancer Paternal Grandmother     Gestational diabetes Sister     Asthma Sister         as a child    Stroke Neg Hx     Colon cancer Neg Hx     Ovarian cancer Neg Hx        Social History     Tobacco Use    Smoking status: Never     Passive exposure: Never    Smokeless tobacco: Never   Substance Use Topics    Alcohol use: Yes     Comment: 2 drinks twice/month    Drug use: No       Lab Results   Component Value Date    WBC 4.21 06/16/2021    HGB 13.4 06/16/2021    HCT 42.5 06/16/2021    MCV 89 06/16/2021     06/16/2021    CHOL 167 06/16/2021    TRIG 78 06/16/2021    HDL 42 06/16/2021     ALT 19 06/16/2021    AST 17 06/16/2021    BILITOT 0.6 06/16/2021    ALKPHOS 79 06/16/2021     06/16/2021    K 4.5 06/16/2021     06/16/2021    CREATININE 0.7 06/16/2021    ESTGFRAFRICA >60.0 06/16/2021    EGFRNONAA >60.0 06/16/2021    CALCIUM 9.5 06/16/2021    ALBUMIN 3.9 06/16/2021    BUN 14 06/16/2021    CO2 24 06/16/2021    TSH 1.396 06/16/2021    INR 1.1 02/15/2011    HGBA1C 5.1 06/16/2021    LDLCALC 109.4 06/16/2021    GLU 88 06/16/2021    [id number] 33 06/16/2021             Vital signs reviewed  PE:   APPEARANCE: Well nourished, well developed, in no acute distress.    HEAD: Normocephalic, atraumatic.  EYES: EOMI.  Conjunctivae noninjected.  NOSE: Mucosa pink. Airway clear.  MOUTH & THROAT: No tonsillar enlargement. No pharyngeal erythema or exudate.   NECK: Supple with no cervical lymphadenopathy.    CHEST: Good inspiratory effort. Lungs clear to auscultation with no wheezes or crackles.  CARDIOVASCULAR: Normal S1, S2. No rubs, murmurs, or gallops.  ABDOMEN: Bowel sounds normal. Not distended. Soft. Slight TTP in epigastrium, No organomegaly.  EXTREMITIES: No edema, cyanosis, or clubbing.    Review of Systems   Constitutional:  Negative for chills, fatigue and fever.   HENT: Negative.     Respiratory:  Negative for cough, shortness of breath and wheezing.    Cardiovascular:  Negative for chest pain, palpitations and leg swelling.   Gastrointestinal:  Positive for abdominal pain, change in bowel habit, diarrhea, nausea, vomiting and reflux. Negative for anal bleeding, blood in stool, constipation, rectal pain and fecal incontinence.   Genitourinary: Negative.    Musculoskeletal: Negative.    Neurological: Negative.    Psychiatric/Behavioral: Negative.     All other systems reviewed and are negative.      IMPRESSION  1. Hematemesis with nausea    2. Gastroesophageal reflux disease, unspecified whether esophagitis present    3. Dysphagia, unspecified type    4. Elevated BP without diagnosis of  hypertension            PLAN      1. Gastroesophageal reflux disease, unspecified whether esophagitis present    - pantoprazole (PROTONIX) 40 MG tablet; Take 1 tablet (40 mg total) by mouth once daily.  Dispense: 30 tablet; Refill: 3      2. Hematemesis with nausea    - Ambulatory referral/consult to Gastroenterology; Future      3. Dysphagia, unspecified type     - Ambulatory referral/consult to Gastroenterology; Future      Reviewed last HGB done in 2018    She would probably need another EGD, referral to GI placed     Advised to start taking Protonix    Advised to avoid spicy, caffeinated beverages     Encouraged to dinner early to have gap of few hours between dinner time and nighttime      Return ED/ urgent cares precautions discussed with the patient      4. Elevated BP without diagnosis of hypertension    Advised to monitor blood pressure at home   Low-salt diet, regular exercise    Follow up for blood pressure check      Age/demographic appropriate health maintenance:    Health Maintenance Due   Topic Date Due    COVID-19 Vaccine (5 - 2023-24 season) 09/01/2023           Spent adequate time in obtaining history and explaining differentials     30 minutes spent during this visit of which greater than 50% devoted to face-face counseling and coordination of care regarding diagnosis and management plan     Encouraged to establish care with a primary doctor, christian Gregorio   3/28/2024

## 2024-04-01 ENCOUNTER — OFFICE VISIT (OUTPATIENT)
Dept: PSYCHIATRY | Facility: CLINIC | Age: 44
End: 2024-04-01
Payer: COMMERCIAL

## 2024-04-01 DIAGNOSIS — F41.1 GENERALIZED ANXIETY DISORDER WITH PANIC ATTACKS: ICD-10-CM

## 2024-04-01 DIAGNOSIS — F41.0 GENERALIZED ANXIETY DISORDER WITH PANIC ATTACKS: ICD-10-CM

## 2024-04-01 DIAGNOSIS — F33.1 MDD (MAJOR DEPRESSIVE DISORDER), RECURRENT EPISODE, MODERATE: Primary | ICD-10-CM

## 2024-04-01 PROCEDURE — 99999 PR PBB SHADOW E&M-EST. PATIENT-LVL I: CPT | Mod: PBBFAC,,, | Performed by: SOCIAL WORKER

## 2024-04-01 PROCEDURE — 90834 PSYTX W PT 45 MINUTES: CPT | Mod: S$GLB,,, | Performed by: SOCIAL WORKER

## 2024-04-01 NOTE — PROGRESS NOTES
"Individual Psychotherapy (PhD/LCSW)    4/1/2024    Site:  [facility]         Therapeutic Intervention: Met with patient.  Outpatient - Supportive psychotherapy 45 min - CPT Code 46753    Chief complaint/reason for encounter: depression and anxiety     Interval history and content of current session: The patient presents with a pleasant mood and an appropriate affect. States that she stops taking her anti-depressants and she finds that she has more energy. States that it did help with the anxiety but found that she had no motivation to do anything. States that she was sleeping over 10 hours.   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   States that she feels as though she is being taken advantage of at her current school.   Her mood is much different today and states that she noticed that when she decided to apply for the new job, "it felt like a weight had been lifted."  States that she was also having some health issues that she feels is resolving, attibutes thouse issues to the stress of her job. 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     Treatment plan:  Target symptoms: depression, distractability, lack of focus, recurrent depression, anxiety   Why chosen therapy is appropriate versus another modality: relevant to diagnosis, patient responds to this modality, evidence based practice  Outcome monitoring methods: self-report, observation  Therapeutic intervention type: insight oriented psychotherapy, behavior modifying psychotherapy, supportive psychotherapy    Risk parameters:  Patient reports no suicidal ideation  Patient reports no homicidal ideation  Patient reports no self-injurious behavior  Patient reports no violent behavior    Verbal deficits: " None    Patient's response to intervention:  The patient's response to intervention is accepting.    Progress toward goals and other mental status changes:  The patient's progress toward goals is fair .    Diagnosis:     ICD-10-CM ICD-9-CM   1. MDD (major depressive disorder), recurrent episode, moderate  F33.1 296.32   2. Generalized anxiety disorder with panic attacks  F41.1 300.02    F41.0 300.01       Plan:  individual psychotherapy    Return to clinic: 1 week    Length of Service (minutes): 45

## 2024-04-02 ENCOUNTER — LAB VISIT (OUTPATIENT)
Dept: LAB | Facility: HOSPITAL | Age: 44
End: 2024-04-02
Attending: INTERNAL MEDICINE
Payer: COMMERCIAL

## 2024-04-02 ENCOUNTER — OFFICE VISIT (OUTPATIENT)
Dept: GASTROENTEROLOGY | Facility: CLINIC | Age: 44
End: 2024-04-02
Payer: COMMERCIAL

## 2024-04-02 ENCOUNTER — TELEPHONE (OUTPATIENT)
Dept: ENDOSCOPY | Facility: HOSPITAL | Age: 44
End: 2024-04-02
Payer: COMMERCIAL

## 2024-04-02 VITALS
HEIGHT: 61 IN | SYSTOLIC BLOOD PRESSURE: 135 MMHG | BODY MASS INDEX: 39.63 KG/M2 | DIASTOLIC BLOOD PRESSURE: 94 MMHG | HEART RATE: 91 BPM | WEIGHT: 209.88 LBS

## 2024-04-02 DIAGNOSIS — K92.0 HEMATEMESIS WITHOUT NAUSEA: ICD-10-CM

## 2024-04-02 DIAGNOSIS — K21.9 GASTROESOPHAGEAL REFLUX DISEASE, UNSPECIFIED WHETHER ESOPHAGITIS PRESENT: Primary | ICD-10-CM

## 2024-04-02 DIAGNOSIS — R11.11 VOMITING WITHOUT NAUSEA, UNSPECIFIED VOMITING TYPE: ICD-10-CM

## 2024-04-02 DIAGNOSIS — R68.81 EARLY SATIETY: ICD-10-CM

## 2024-04-02 DIAGNOSIS — R09.A2 GLOBUS SENSATION: ICD-10-CM

## 2024-04-02 DIAGNOSIS — R11.10 VOMITING, UNSPECIFIED VOMITING TYPE, UNSPECIFIED WHETHER NAUSEA PRESENT: ICD-10-CM

## 2024-04-02 DIAGNOSIS — R13.10 DYSPHAGIA, UNSPECIFIED TYPE: ICD-10-CM

## 2024-04-02 DIAGNOSIS — R09.A2 GLOBUS SENSATION: Primary | ICD-10-CM

## 2024-04-02 DIAGNOSIS — K21.9 GASTROESOPHAGEAL REFLUX DISEASE, UNSPECIFIED WHETHER ESOPHAGITIS PRESENT: ICD-10-CM

## 2024-04-02 LAB
ALBUMIN SERPL BCP-MCNC: 4 G/DL (ref 3.5–5.2)
ALP SERPL-CCNC: 75 U/L (ref 55–135)
ALT SERPL W/O P-5'-P-CCNC: 38 U/L (ref 10–44)
ANION GAP SERPL CALC-SCNC: 13 MMOL/L (ref 8–16)
AST SERPL-CCNC: 24 U/L (ref 10–40)
BASOPHILS # BLD AUTO: 0.03 K/UL (ref 0–0.2)
BASOPHILS NFR BLD: 0.5 % (ref 0–1.9)
BILIRUB SERPL-MCNC: 0.4 MG/DL (ref 0.1–1)
BUN SERPL-MCNC: 11 MG/DL (ref 6–20)
CALCIUM SERPL-MCNC: 9.5 MG/DL (ref 8.7–10.5)
CHLORIDE SERPL-SCNC: 103 MMOL/L (ref 95–110)
CO2 SERPL-SCNC: 24 MMOL/L (ref 23–29)
CREAT SERPL-MCNC: 0.8 MG/DL (ref 0.5–1.4)
DIFFERENTIAL METHOD BLD: NORMAL
EOSINOPHIL # BLD AUTO: 0.1 K/UL (ref 0–0.5)
EOSINOPHIL NFR BLD: 1.5 % (ref 0–8)
ERYTHROCYTE [DISTWIDTH] IN BLOOD BY AUTOMATED COUNT: 13.2 % (ref 11.5–14.5)
EST. GFR  (NO RACE VARIABLE): >60 ML/MIN/1.73 M^2
GLUCOSE SERPL-MCNC: 85 MG/DL (ref 70–110)
HCT VFR BLD AUTO: 43.5 % (ref 37–48.5)
HGB BLD-MCNC: 14 G/DL (ref 12–16)
IMM GRANULOCYTES # BLD AUTO: 0.03 K/UL (ref 0–0.04)
IMM GRANULOCYTES NFR BLD AUTO: 0.5 % (ref 0–0.5)
LIPASE SERPL-CCNC: 42 U/L (ref 4–60)
LYMPHOCYTES # BLD AUTO: 1.2 K/UL (ref 1–4.8)
LYMPHOCYTES NFR BLD: 19.9 % (ref 18–48)
MCH RBC QN AUTO: 28.1 PG (ref 27–31)
MCHC RBC AUTO-ENTMCNC: 32.2 G/DL (ref 32–36)
MCV RBC AUTO: 87 FL (ref 82–98)
MONOCYTES # BLD AUTO: 0.7 K/UL (ref 0.3–1)
MONOCYTES NFR BLD: 11 % (ref 4–15)
NEUTROPHILS # BLD AUTO: 4 K/UL (ref 1.8–7.7)
NEUTROPHILS NFR BLD: 66.6 % (ref 38–73)
NRBC BLD-RTO: 0 /100 WBC
PLATELET # BLD AUTO: 249 K/UL (ref 150–450)
PMV BLD AUTO: 10.3 FL (ref 9.2–12.9)
POTASSIUM SERPL-SCNC: 4.6 MMOL/L (ref 3.5–5.1)
PROT SERPL-MCNC: 8 G/DL (ref 6–8.4)
RBC # BLD AUTO: 4.98 M/UL (ref 4–5.4)
SODIUM SERPL-SCNC: 140 MMOL/L (ref 136–145)
WBC # BLD AUTO: 6.02 K/UL (ref 3.9–12.7)

## 2024-04-02 PROCEDURE — 3075F SYST BP GE 130 - 139MM HG: CPT | Mod: CPTII,S$GLB,, | Performed by: INTERNAL MEDICINE

## 2024-04-02 PROCEDURE — 80053 COMPREHEN METABOLIC PANEL: CPT | Performed by: INTERNAL MEDICINE

## 2024-04-02 PROCEDURE — 83690 ASSAY OF LIPASE: CPT | Performed by: INTERNAL MEDICINE

## 2024-04-02 PROCEDURE — 1160F RVW MEDS BY RX/DR IN RCRD: CPT | Mod: CPTII,S$GLB,, | Performed by: INTERNAL MEDICINE

## 2024-04-02 PROCEDURE — 3008F BODY MASS INDEX DOCD: CPT | Mod: CPTII,S$GLB,, | Performed by: INTERNAL MEDICINE

## 2024-04-02 PROCEDURE — 99999 PR PBB SHADOW E&M-EST. PATIENT-LVL III: CPT | Mod: PBBFAC,,, | Performed by: INTERNAL MEDICINE

## 2024-04-02 PROCEDURE — 36415 COLL VENOUS BLD VENIPUNCTURE: CPT | Performed by: INTERNAL MEDICINE

## 2024-04-02 PROCEDURE — 85025 COMPLETE CBC W/AUTO DIFF WBC: CPT | Performed by: INTERNAL MEDICINE

## 2024-04-02 PROCEDURE — 3080F DIAST BP >= 90 MM HG: CPT | Mod: CPTII,S$GLB,, | Performed by: INTERNAL MEDICINE

## 2024-04-02 PROCEDURE — 1159F MED LIST DOCD IN RCRD: CPT | Mod: CPTII,S$GLB,, | Performed by: INTERNAL MEDICINE

## 2024-04-02 PROCEDURE — 99204 OFFICE O/P NEW MOD 45 MIN: CPT | Mod: S$GLB,,, | Performed by: INTERNAL MEDICINE

## 2024-04-02 NOTE — TELEPHONE ENCOUNTER
"----- Message from Lucho Coker MD sent at 4/2/2024 10:52 AM CDT -----  Regarding: EGD  Procedure: EGD    Diagnosis: Vomiting with hematemesis, globus sensation, early satiety, GERD    Procedure Timing: within 1 week or so    #If within 4 weeks selected, please hermilo as high priority#    #If greater than 12 weeks, please select "5-12 weeks" and delay sending until 3 months prior to requested date#     Provider: Any GI provider    Location: No Preference    Additional Scheduling Information: No scheduling concerns    Prep Specifications:N/A    Is the patient taking a GLP-1 Agonist:no    Have you attached a patient to this message: yes      "

## 2024-04-02 NOTE — PROGRESS NOTES
[facility] Consultation Note    Reason for Consult:    Chief Complaint   Patient presents with    Hematemesis    Gastroesophageal Reflux    Food feels trap in throat    Dysphagia       PCP:   Tami Gregorio    Referring MD:  Tami Gregorio Md  [address]  [address]  [place] [address]      HPI:  Laura Fitzpatrick is a 43 y.o. female here for evaluation of worsening reflux symptoms, globus sensation, intermittent vomiting, and an episode of hematemesis.  She was last seen in our clinic more than 5 years ago for dysphagia and reflux symptoms.  She had been doing fairly well over the past few years, but noted recurrence and worsening of symptoms starting about a month ago.  She noticed worsening reflux symptoms associated with globus sensation.  This globus sensation causes her to gag.  She has vomited on several occasions due to this.  During a recent episode of vomiting, she experienced a small amount of hematemesis with red blood and clots.  This has not returned.  Her stools never changed color.  She feels like food takes longer to go down, but denies food getting stuck in her esophagus.  She denies odynophagia.  She saw primary Care who started her on Protonix last week.  Heartburn has improved, but she continues to have globus sensation and gagging with intermittent vomiting.  She also admits to mild early satiety.  Her appetite has been normal, and unchanged.  She denies nausea.  She has had no changes to medications, dietary pattern, or routine.        Endoscopic history:   EGD 09/20/2018 for evaluation of dysphagia and GERD.  The exam was unremarkable.  Biopsies of the esophagus, stomach, and duodenal were all within normal.          ROS:  Constitutional: No fevers, chills, No weight loss, normal appetite  GI: see HPI        Medical History:  has a past medical history of Abnormal Pap smear (12 years ago), Abnormal Pap smear of cervix, Allergy, Anxiety, Cervical radiculopathy  (04/29/2015), Cervical radiculopathy (04/29/2015), Depression, Enlarged thyroid gland (01/26/2015), GERD (gastroesophageal reflux disease), History of psychiatric hospitalization, psychiatric care, Obesity (BMI 30-39.9), Psychiatric problem, Right-sided carotid artery disease (02/08/2016), and Therapy.    Surgical History:  has a past surgical history that includes Appendectomy (2011); Myringotomy w/ tubes; Inguinal hernia repair (Right, 2017); and Esophagogastroduodenoscopy (N/A, 9/20/2018).    Family History: family history includes Anxiety disorder in her mother; Asthma in her sister; Breast cancer in her paternal grandmother; Cancer (age of onset: 58) in her paternal grandmother; Depression in her mother; Diabetes in her father, maternal grandfather, maternal grandmother, mother, and paternal grandmother; Gestational diabetes in her sister; Heart disease in her maternal grandmother; Heart disease (age of onset: 40) in her mother; Hypertension in her father; Kidney disease in her father; Obesity in her father and mother.    Social History:  reports that she has never smoked. She has never been exposed to tobacco smoke. She has never used smokeless tobacco. She reports current alcohol use. She reports that she does not use drugs.    Review of patient's allergies indicates:   Allergen Reactions    Wellbutrin [bupropion hcl] Anxiety       Prior to Admission medications    Medication Sig Start Date End Date Taking? Authorizing Provider   multivitamin (THERAGRAN) per tablet Take 1 tablet by mouth once daily.   Yes Provider, Historical   pantoprazole (PROTONIX) 40 MG tablet Take 1 tablet (40 mg total) by mouth once daily. 3/28/24 3/28/25 Yes Tami Gregorio MD   venlafaxine (EFFEXOR-XR) 37.5 MG 24 hr capsule Take 1 capsule (37.5 mg total) by mouth once daily.  Patient not taking: Reported on 3/28/2024 3/12/24 4/2/24  Barb Bah NP-C       Objective Findings:  Vital Signs:  BP (!) 135/94   Pulse 91   Ht  "5' 1" (1.549 m)   Wt 95.2 kg (209 lb 14.1 oz)   LMP 03/13/2024 (Approximate)   BMI 39.66 kg/m²   Body mass index is 39.66 kg/m².      Physical Exam:  General Appearance:  Well appearing in no acute distress, appears stated age  Head:  Normocephalic, atraumatic  Eyes:  No scleral icterus or pallor, EOMI        Labs:  Lab Results   Component Value Date    WBC 4.21 06/16/2021    HGB 13.4 06/16/2021    HCT 42.5 06/16/2021    MCV 89 06/16/2021    RDW 13.9 06/16/2021     06/16/2021    GRAN 2.2 06/16/2021    GRAN 53.2 06/16/2021    LYMPH 1.3 06/16/2021    LYMPH 30.9 06/16/2021    MONO 0.5 06/16/2021    MONO 12.6 06/16/2021    EOS 0.1 06/16/2021    BASO 0.02 06/16/2021     Lab Results   Component Value Date     06/16/2021    K 4.5 06/16/2021     06/16/2021    CO2 24 06/16/2021    GLU 88 06/16/2021    BUN 14 06/16/2021    CREATININE 0.7 06/16/2021    CALCIUM 9.5 06/16/2021    PROT 7.5 06/16/2021    ALBUMIN 3.9 06/16/2021    BILITOT 0.6 06/16/2021    ALKPHOS 79 06/16/2021    AST 17 06/16/2021    ALT 19 06/16/2021                 Imaging:  Ultrasound of the abdomen 05/31/2017 with changes consistent with hepatic steatosis.  Otherwise, the exam was unremarkable.        Modified barium swallow study 07/25/2018 was within normal limits                Assessment:  Laura Fitzpatrick is a 43 y.o. female with:  1. Globus sensation    2. Vomiting without nausea, unspecified vomiting type    3. Hematemesis without nausea    4. Dysphagia, unspecified type    5. Gastroesophageal reflux disease, unspecified whether esophagitis present    6. Early satiety      Recent worsening of reflux symptoms associated with globus sensation and gagging with intermittent vomiting.  She had an episode of hematemesis associated with vomiting, which sounds consistent with either a Anna-Rueda tear or could be related to esophagitis.  She denies significant nausea.  She has mild early satiety.  It is unclear what triggered her " symptoms to worsen.  She had been doing well, without acid reducing therapy.  Protonix was started last week, with mild improvement heartburn, but ongoing globus sensation in gagging with intermittent vomiting.  Prior EGD in 2018 was within normal limits with normal esophageal, gastric, and duodenal biopsies.      Recommendations/Plan:  1. Labs today as noted below  2. I will arrange for urgent EGD  3. Continue Protonix as is, and she may use Gaviscon as well      Follow-up pending results of EGD.      Order summary:  Orders Placed This Encounter    CBC Auto Differential    Comprehensive Metabolic Panel    Lipase         Thank you so much for allowing me to participate in the care of Laura Coker MD

## 2024-04-02 NOTE — TELEPHONE ENCOUNTER
Spoke to pt to schedule procedure(s) Upper Endoscopy (EGD)       Physician to perform procedure(s) Dr. LOUIS Mo  Date of Procedure (s) 04/08/24  Arrival Time 8:45 AM  Time of Procedure(s) 9:45 AM   Location of Procedure(s) [facility] 2nd Floor--Enter at the rear of the building through the emergency department screening station or the Outpatient Registration door, then continue to endoscopy department on the 2nd floor.    Type of Rx Prep sent to patient: N/A  Instructions provided to patient via [facility]/in clinic    Patient was informed on the following information and verbalized understanding. Screening questionnaire reviewed with patient and complete. If procedure requires anesthesia, a responsible adult needs to be present to accompany the patient home, patient cannot drive after receiving anesthesia. Appointment details are tentative, especially check-in time. Patient will receive a prep-op call 7 days prior to confirm check-in time for procedure. If applicable the patient should contact their pharmacy to verify Rx for procedure prep is ready for pick-up. Patient was advised to call the scheduling department at [phone] if pharmacy states no Rx is available. Patient was advised to call the endoscopy scheduling department if any questions or concerns arise.      [facility] Scheduling Department

## 2024-04-02 NOTE — PROGRESS NOTES
"  GENERAL GI PATIENT INTAKE:    COVID symptoms in the last 7 days (runny nose, sore throat, congestion, cough, fever): No  PCP: Tami Gregorio  If not PCP-  number given to establish [phone]: No    ALLERGIES REVIEWED:  Yes    CHIEF COMPLAINT:    Chief Complaint   Patient presents with    Hematemesis    Gastroesophageal Reflux    Food feels trap in throat    Dysphagia       VITAL SIGNS:  Ht 5' 1" (1.549 m)   Wt 95.2 kg (209 lb 14.1 oz)   LMP 03/13/2024 (Approximate)   BMI 39.66 kg/m²      Change in medical, surgical, family or social history: No      REVIEWED MEDICATION LIST RECONCILED INCLUDING ABOVE MEDS:  Yes   "

## 2024-04-03 ENCOUNTER — PATIENT MESSAGE (OUTPATIENT)
Dept: GASTROENTEROLOGY | Facility: CLINIC | Age: 44
End: 2024-04-03
Payer: COMMERCIAL

## 2024-04-07 ENCOUNTER — ANESTHESIA EVENT (OUTPATIENT)
Dept: ENDOSCOPY | Facility: HOSPITAL | Age: 44
End: 2024-04-07
Payer: COMMERCIAL

## 2024-04-07 RX ORDER — LIDOCAINE HYDROCHLORIDE 10 MG/ML
1 INJECTION, SOLUTION EPIDURAL; INFILTRATION; INTRACAUDAL; PERINEURAL ONCE
Status: CANCELLED | OUTPATIENT
Start: 2024-04-07 | End: 2024-04-07

## 2024-04-08 ENCOUNTER — HOSPITAL ENCOUNTER (OUTPATIENT)
Facility: HOSPITAL | Age: 44
Discharge: HOME | End: 2024-04-08
Attending: STUDENT IN AN ORGANIZED HEALTH CARE EDUCATION/TRAINING PROGRAM | Admitting: STUDENT IN AN ORGANIZED HEALTH CARE EDUCATION/TRAINING PROGRAM
Payer: COMMERCIAL

## 2024-04-08 ENCOUNTER — ANESTHESIA (OUTPATIENT)
Dept: ENDOSCOPY | Facility: HOSPITAL | Age: 44
End: 2024-04-08
Payer: COMMERCIAL

## 2024-04-08 VITALS
OXYGEN SATURATION: 100 % | DIASTOLIC BLOOD PRESSURE: 77 MMHG | SYSTOLIC BLOOD PRESSURE: 114 MMHG | TEMPERATURE: 98 F | RESPIRATION RATE: 18 BRPM | HEART RATE: 76 BPM

## 2024-04-08 DIAGNOSIS — K21.9 GASTROESOPHAGEAL REFLUX DISEASE, UNSPECIFIED WHETHER ESOPHAGITIS PRESENT: ICD-10-CM

## 2024-04-08 LAB
B-HCG UR QL: NEGATIVE
CTP QC/QA: YES

## 2024-04-08 PROCEDURE — 88305 TISSUE EXAM BY PATHOLOGIST: CPT | Performed by: PATHOLOGY

## 2024-04-08 PROCEDURE — 43239 EGD BIOPSY SINGLE/MULTIPLE: CPT | Performed by: STUDENT IN AN ORGANIZED HEALTH CARE EDUCATION/TRAINING PROGRAM

## 2024-04-08 PROCEDURE — 88305 TISSUE EXAM BY PATHOLOGIST: CPT | Mod: 26,,, | Performed by: PATHOLOGY

## 2024-04-08 PROCEDURE — 88342 IMHCHEM/IMCYTCHM 1ST ANTB: CPT | Mod: 26,,, | Performed by: PATHOLOGY

## 2024-04-08 PROCEDURE — 25000003 PHARM REV CODE 250: Performed by: ANESTHESIOLOGY

## 2024-04-08 PROCEDURE — 37000009 HC ANESTHESIA EA ADD 15 MINS: Performed by: STUDENT IN AN ORGANIZED HEALTH CARE EDUCATION/TRAINING PROGRAM

## 2024-04-08 PROCEDURE — D9220A PRA ANESTHESIA: Mod: ANES,,, | Performed by: ANESTHESIOLOGY

## 2024-04-08 PROCEDURE — 81025 URINE PREGNANCY TEST: CPT | Performed by: STUDENT IN AN ORGANIZED HEALTH CARE EDUCATION/TRAINING PROGRAM

## 2024-04-08 PROCEDURE — 63600175 PHARM REV CODE 636 W HCPCS: Performed by: STUDENT IN AN ORGANIZED HEALTH CARE EDUCATION/TRAINING PROGRAM

## 2024-04-08 PROCEDURE — 25000003 PHARM REV CODE 250: Performed by: STUDENT IN AN ORGANIZED HEALTH CARE EDUCATION/TRAINING PROGRAM

## 2024-04-08 PROCEDURE — 43239 EGD BIOPSY SINGLE/MULTIPLE: CPT | Mod: ,,, | Performed by: STUDENT IN AN ORGANIZED HEALTH CARE EDUCATION/TRAINING PROGRAM

## 2024-04-08 PROCEDURE — 37000008 HC ANESTHESIA 1ST 15 MINUTES: Performed by: STUDENT IN AN ORGANIZED HEALTH CARE EDUCATION/TRAINING PROGRAM

## 2024-04-08 PROCEDURE — 27201012 HC FORCEPS, HOT/COLD, DISP: Performed by: STUDENT IN AN ORGANIZED HEALTH CARE EDUCATION/TRAINING PROGRAM

## 2024-04-08 PROCEDURE — 88342 IMHCHEM/IMCYTCHM 1ST ANTB: CPT | Performed by: PATHOLOGY

## 2024-04-08 PROCEDURE — D9220A PRA ANESTHESIA: Mod: CRNA,,, | Performed by: STUDENT IN AN ORGANIZED HEALTH CARE EDUCATION/TRAINING PROGRAM

## 2024-04-08 RX ORDER — SODIUM CHLORIDE 9 MG/ML
INJECTION, SOLUTION INTRAVENOUS CONTINUOUS
Status: DISCONTINUED | OUTPATIENT
Start: 2024-04-08 | End: 2024-04-08 | Stop reason: HOSPADM

## 2024-04-08 RX ORDER — PROPOFOL 10 MG/ML
VIAL (ML) INTRAVENOUS
Status: DISCONTINUED
Start: 2024-04-08 | End: 2024-04-08 | Stop reason: HOSPADM

## 2024-04-08 RX ORDER — LIDOCAINE HYDROCHLORIDE 20 MG/ML
INJECTION, SOLUTION EPIDURAL; INFILTRATION; INTRACAUDAL; PERINEURAL
Status: DISCONTINUED
Start: 2024-04-08 | End: 2024-04-08 | Stop reason: HOSPADM

## 2024-04-08 RX ORDER — LIDOCAINE HYDROCHLORIDE 20 MG/ML
INJECTION INTRAVENOUS
Status: DISCONTINUED | OUTPATIENT
Start: 2024-04-08 | End: 2024-04-08

## 2024-04-08 RX ORDER — PROPOFOL 10 MG/ML
VIAL (ML) INTRAVENOUS
Status: DISCONTINUED | OUTPATIENT
Start: 2024-04-08 | End: 2024-04-08

## 2024-04-08 RX ADMIN — PROPOFOL 50 MG: 10 INJECTION, EMULSION INTRAVENOUS at 10:04

## 2024-04-08 RX ADMIN — LIDOCAINE HYDROCHLORIDE 100 MG: 20 INJECTION, SOLUTION INTRAVENOUS at 10:04

## 2024-04-08 RX ADMIN — SODIUM CHLORIDE: 0.9 INJECTION, SOLUTION INTRAVENOUS at 09:04

## 2024-04-08 RX ADMIN — PROPOFOL 30 MG: 10 INJECTION, EMULSION INTRAVENOUS at 10:04

## 2024-04-08 RX ADMIN — PROPOFOL 100 MG: 10 INJECTION, EMULSION INTRAVENOUS at 10:04

## 2024-04-08 NOTE — TRANSFER OF CARE
Anesthesia Transfer of Care Note    Patient: Laura Fitzpatrick    Procedure(s) Performed: Procedure(s) (LRB):  EGD (ESOPHAGOGASTRODUODENOSCOPY) (N/A)    Patient location: GI    Anesthesia Type: general    Transport from OR: Transported from OR on room air with adequate spontaneous ventilation    Post pain: adequate analgesia    Post assessment: no apparent anesthetic complications and tolerated procedure well    Post vital signs: stable    Level of consciousness: sedated    Nausea/Vomiting: no nausea/vomiting    Complications: none    Transfer of care protocol was followed      Last vitals: Visit Vitals  /61 (BP Location: Left arm)   Pulse 85   Temp 36.8 °C (98.2 °F) (Oral)   Resp 17   LMP 03/09/2024 (Exact Date)   SpO2 97%   Breastfeeding No

## 2024-04-08 NOTE — H&P
Short Stay Endoscopy History and Physical    PCP - Tami Gregorio MD  Referring Physician - Lucho Coker MD  [address] [place] [address]    Procedure - EGD  ASA - per anesthesia  Mallampati - per anesthesia  History of Anesthesia problems - no  Family history Anesthesia problems -  no   Plan of anesthesia - General    HPI  43 y.o. female  Reason for procedure:  Gastroesophageal reflux disease, unspecified whether esophagitis present [K21.9]  Vomiting, unspecified vomiting type, unspecified whether nausea present [R11.10]  Globus sensation [R09.A2]  Early satiety [R68.81]      ROS:  Constitutional: No fevers, chills, No weight loss  CV: No chest pain  Pulm: No cough, No shortness of breath  GI: see HPI    Medical History:  has a past medical history of Abnormal Pap smear (12 years ago), Abnormal Pap smear of cervix, Allergy, Anxiety, Cervical radiculopathy (04/29/2015), Cervical radiculopathy (04/29/2015), Depression, Enlarged thyroid gland (01/26/2015), GERD (gastroesophageal reflux disease), History of psychiatric hospitalization, psychiatric care, Obesity (BMI 30-39.9), Psychiatric problem, Right-sided carotid artery disease (02/08/2016), and Therapy.    Surgical History:  has a past surgical history that includes Appendectomy (2011); Myringotomy w/ tubes; Inguinal hernia repair (Right, 2017); and Esophagogastroduodenoscopy (N/A, 9/20/2018).    Family History: family history includes Anxiety disorder in her mother; Asthma in her sister; Breast cancer in her paternal grandmother; Cancer (age of onset: 58) in her paternal grandmother; Depression in her mother; Diabetes in her father, maternal grandfather, maternal grandmother, mother, and paternal grandmother; Gestational diabetes in her sister; Heart disease in her maternal grandmother; Heart disease (age of onset: 40) in her mother; Hypertension in her father; Kidney disease in her father; Obesity in her father and mother..    Social  History:  reports that she has never smoked. She has never been exposed to tobacco smoke. She has never used smokeless tobacco. She reports current alcohol use. She reports that she does not use drugs.    Review of patient's allergies indicates:   Allergen Reactions    Wellbutrin [bupropion hcl] Anxiety       Medications:   Medications Prior to Admission   Medication Sig Dispense Refill Last Dose    pantoprazole (PROTONIX) 40 MG tablet Take 1 tablet (40 mg total) by mouth once daily. 30 tablet 3 4/7/2024    multivitamin (THERAGRAN) per tablet Take 1 tablet by mouth once daily.          Physical Exam:    Vital Signs: There were no vitals filed for this visit.    General Appearance: Well appearing in no acute distress  Abdomen: Soft, non tender, non distended with normal bowel sounds, no masses      Labs:  Lab Results   Component Value Date    WBC 6.02 04/02/2024    HGB 14.0 04/02/2024    HCT 43.5 04/02/2024     04/02/2024    CHOL 167 06/16/2021    TRIG 78 06/16/2021    HDL 42 06/16/2021    ALT 38 04/02/2024    AST 24 04/02/2024     04/02/2024    K 4.6 04/02/2024     04/02/2024    CREATININE 0.8 04/02/2024    BUN 11 04/02/2024    CO2 24 04/02/2024    TSH 1.396 06/16/2021    INR 1.1 02/15/2011    HGBA1C 5.1 06/16/2021       I have explained the risks and benefits of this endoscopic procedure to the patient including but not limited to bleeding, inflammation, infection, perforation, and death.      Miguel A Mo MD

## 2024-04-08 NOTE — PROVATION PATIENT INSTRUCTIONS
Discharge Summary/Instructions after an Endoscopic Procedure  Patient Name: Laura Fitzpatrick  Patient MRN: [id number]  Patient YOB: 1980  Monday, April 8, 2024  Miguel A Mo MD  Dear patient,  As a result of recent federal legislation (The Federal Cures Act), you may   receive lab or pathology results from your procedure in your [facility]   account before your physician is able to contact you. Your physician or   their representative will relay the results to you with their   recommendations at their soonest availability.  Thank you,  RESTRICTIONS:  During your procedure today, you received medications for sedation.  These   medications may affect your judgment, balance and coordination.  Therefore,   for 24 hours, you have the following restrictions:   - DO NOT drive a car, operate machinery, make legal/financial decisions,   sign important papers or drink alcohol.    ACTIVITY:  Today: no heavy lifting, straining or running due to procedural   sedation/anesthesia.  The following day: return to full activity including work.  DIET:  Eat and drink normally unless instructed otherwise.     TREATMENT FOR COMMON SIDE EFFECTS:  - Mild abdominal pain, nausea, belching, bloating or excessive gas:  rest,   eat lightly and use a heating pad.  - Sore Throat: treat with throat lozenges and/or gargle with warm salt   water.  - Because air was used during the procedure, expelling large amounts of air   from your rectum or belching is normal.  - If a bowel prep was taken, you may not have a bowel movement for 1-3 days.    This is normal.  SYMPTOMS TO WATCH FOR AND REPORT TO YOUR PHYSICIAN:  1. Abdominal pain or bloating, other than gas cramps.  2. Chest pain.  3. Back pain.  4. Signs of infection such as: chills or fever occurring within 24 hours   after the procedure.  5. Rectal bleeding, which would show as bright red, maroon, or black stools.   (A tablespoon of blood from the rectum is not serious, especially if    hemorrhoids are present.)  6. Vomiting.  7. Weakness or dizziness.  GO DIRECTLY TO THE NEAREST EMERGENCY ROOM IF YOU HAVE ANY OF THE FOLLOWING:      Difficulty breathing              Chills and/or fever over 101 F   Persistent vomiting and/or vomiting blood   Severe abdominal pain   Severe chest pain   Black, tarry stools   Bleeding- more than one tablespoon   Any other symptom or condition that you feel may need urgent attention  Your doctor recommends these additional instructions:  If any biopsies were taken, your doctors clinic will contact you in 1 to 2   weeks with any results.  - Discharge patient to home.   - The findings and recommendations were discussed with the patient.   - Patient has a contact number available for emergencies.  The signs and   symptoms of potential delayed complications were discussed with the   patient.  Return to normal activities tomorrow.  Written discharge   instructions were provided to the patient.   - Await pathology results.  For questions, problems or results please call your physician - Miguel A Mo MD at Work:  [phone].  [facility] can be reached at [phone]     IF A COMPLICATION OR EMERGENCY SITUATION ARISES AND YOU ARE UNABLE TO REACH   YOUR PHYSICIAN - GO DIRECTLY TO THE EMERGENCY ROOM.  Miguel A Mo MD  4/8/2024 10:16:00 AM  This report has been verified and signed electronically.  Dear patient,  As a result of recent federal legislation (The Federal Cures Act), you may   receive lab or pathology results from your procedure in your [facility]   account before your physician is able to contact you. Your physician or   their representative will relay the results to you with their   recommendations at their soonest availability.  Thank you,  PROVATION

## 2024-04-08 NOTE — ANESTHESIA POSTPROCEDURE EVALUATION
Anesthesia Post Evaluation    Patient: Laura Fitzpatrick    Procedure(s) Performed: Procedure(s) (LRB):  EGD (ESOPHAGOGASTRODUODENOSCOPY) (N/A)    Final Anesthesia Type: general      Patient location during evaluation: GI PACU  Patient participation: Yes- Able to Participate  Level of consciousness: awake and alert and oriented  Post-procedure vital signs: reviewed and stable  Pain management: adequate  Airway patency: patent    PONV status at discharge: No PONV  Anesthetic complications: no      Cardiovascular status: hemodynamically stable and blood pressure returned to baseline  Respiratory status: spontaneous ventilation, room air and unassisted  Hydration status: euvolemic  Follow-up not needed.              Vitals Value Taken Time   /77 04/08/24 1049   Temp 36.8 °C (98.2 °F) 04/08/24 1017   Pulse 77 04/08/24 1055   Resp 18 04/08/24 1047   SpO2 100 % 04/08/24 1055   Vitals shown include unvalidated device data.      Event Time   Out of Recovery 10:47:00         Pain/Stephanie Score: Stephanie Score: 10 (4/8/2024 10:47 AM)

## 2024-04-08 NOTE — ANESTHESIA PREPROCEDURE EVALUATION
04/08/2024  Laura Fitzpatrick is a 43 y.o., female.  To undergo Procedure(s) (LRB):  EGD (ESOPHAGOGASTRODUODENOSCOPY) (N/A)     Denies CP/SOB/MI/CVA/URI symptoms.  Endorses GERD.  METS > 4  NPO > 8    Past Medical History:  Past Medical History:   Diagnosis Date    Abnormal Pap smear 12 years ago    Cryotherapy    Abnormal Pap smear of cervix     Allergy     Anxiety     Cervical radiculopathy 04/29/2015    Cervical radiculopathy 04/29/2015    Depression     Enlarged thyroid gland 01/26/2015    GERD (gastroesophageal reflux disease)     History of psychiatric hospitalization     Hx of psychiatric care     Obesity (BMI 30-39.9)     Psychiatric problem     Right-sided carotid artery disease 02/08/2016    Therapy        Past Surgical History:  Past Surgical History:   Procedure Laterality Date    APPENDECTOMY  2011    ESOPHAGOGASTRODUODENOSCOPY N/A 9/20/2018    Procedure: EGD (ESOPHAGOGASTRODUODENOSCOPY);  Surgeon: Karl Judge MD;  Location: [address];  Service: Endoscopy;  Laterality: N/A;    INGUINAL HERNIA REPAIR Right 2017    MYRINGOTOMY W/ TUBES         Social History:  Social History     Socioeconomic History    Marital status: Single    Number of children: 0   Occupational History    Occupation: Speech pathologist    Tobacco Use    Smoking status: Never     Passive exposure: Never    Smokeless tobacco: Never   Substance and Sexual Activity    Alcohol use: Yes     Comment: 2 drinks twice/month    Drug use: No    Sexual activity: Yes     Partners: Male   Social History Narrative    Speech Pathologist @ [employer],  Sporadic exercise     Social Determinants of Health     Financial Resource Strain: Medium Risk (2/6/2024)    Overall Financial Resource Strain (CARDIA)     Difficulty of Paying Living Expenses: Somewhat hard   Food Insecurity: No Food Insecurity (2/6/2024)    Hunger Vital  Sign     Worried About Running Out of Food in the Last Year: Never true     Ran Out of Food in the Last Year: Never true   Transportation Needs: No Transportation Needs (2/6/2024)    PRAPARE - Transportation     Lack of Transportation (Medical): No     Lack of Transportation (Non-Medical): No   Physical Activity: Inactive (2/6/2024)    Exercise Vital Sign     Days of Exercise per Week: 0 days     Minutes of Exercise per Session: 0 min   Stress: Patient Declined (2/6/2024)    [place] [employer] - Occupational Stress Questionnaire     Feeling of Stress : Patient declined   Social Connections: Unknown (2/6/2024)    Social Connection and Isolation Panel [NHANES]     Frequency of Communication with Friends and Family: More than three times a week     Frequency of Social Gatherings with Friends and Family: Once a week     Active Member of Clubs or Organizations: No     Attends Club or Organization Meetings: Never     Marital Status: Never    Housing Stability: Low Risk  (2/6/2024)    Housing Stability Vital Sign     Unable to Pay for Housing in the Last Year: No     Number of Places Lived in the Last Year: 2     Unstable Housing in the Last Year: No       Medications:  No current facility-administered medications on file prior to encounter.     Current Outpatient Medications on File Prior to Encounter   Medication Sig Dispense Refill    multivitamin (THERAGRAN) per tablet Take 1 tablet by mouth once daily.      pantoprazole (PROTONIX) 40 MG tablet Take 1 tablet (40 mg total) by mouth once daily. 30 tablet 3       Allergies:  Review of patient's allergies indicates:   Allergen Reactions    Wellbutrin [bupropion hcl] Anxiety       Active Problems:  Patient Active Problem List   Diagnosis    Obesity (BMI 30-39.9)    Mild acid reflux    Pharyngoesophageal dysphagia    Dysphagia    Acute pain of left shoulder    Decreased range of motion of left shoulder    Decreased strength    Impaired functional  mobility and activity tolerance       Diagnostic Studies:   Latest Reference Range & Units 04/02/24 11:47   WBC 3.90 - 12.70 K/uL 6.02   RBC 4.00 - 5.40 M/uL 4.98   Hemoglobin 12.0 - 16.0 g/dL 14.0   Hematocrit 37.0 - 48.5 % 43.5   MCV 82 - 98 fL 87   MCH 27.0 - 31.0 pg 28.1   MCHC 32.0 - 36.0 g/dL 32.2   RDW 11.5 - 14.5 % 13.2   Platelet Count 150 - 450 K/uL 249   MPV 9.2 - 12.9 fL 10.3   Gran % 38.0 - 73.0 % 66.6   Lymph % 18.0 - 48.0 % 19.9   Mono % 4.0 - 15.0 % 11.0   Eos % 0.0 - 8.0 % 1.5   Basophil % 0.0 - 1.9 % 0.5   Immature Granulocytes 0.0 - 0.5 % 0.5   Gran # (ANC) 1.8 - 7.7 K/uL 4.0   Lymph # 1.0 - 4.8 K/uL 1.2   Mono # 0.3 - 1.0 K/uL 0.7   Eos # 0.0 - 0.5 K/uL 0.1   Baso # 0.00 - 0.20 K/uL 0.03   Immature Grans (Abs) 0.00 - 0.04 K/uL 0.03   nRBC 0 /100 WBC 0   Differential Method  Automated      Latest Reference Range & Units 04/02/24 11:47   Sodium 136 - 145 mmol/L 140   Potassium 3.5 - 5.1 mmol/L 4.6   Chloride 95 - 110 mmol/L 103   CO2 23 - 29 mmol/L 24   Anion Gap 8 - 16 mmol/L 13   BUN 6 - 20 mg/dL 11   Creatinine 0.5 - 1.4 mg/dL 0.8   eGFR >60 mL/min/1.73 m^2 >60.0     24 Hour Vitals:  BP: ()/()   Arterial Line BP: ()/()    See Nursing Charting For Additional Vitals      Pre-op Assessment    I have reviewed the Patient Summary Reports.     I have reviewed the Nursing Notes.       Review of Systems  Anesthesia Hx:  No problems with previous Anesthesia               Denies Personal Hx of Anesthesia complications.                    Social:  Non-Smoker, Social Alcohol Use       Cardiovascular:  Cardiovascular Normal Exercise tolerance: good                                           Pulmonary:  Pulmonary Normal                       Hepatic/GI:     GERD             Musculoskeletal:         Spine Disorders:             Neurological:  Neurology Normal                                      Endocrine:        Morbid Obesity / BMI > 40  Psych:  Psychiatric History anxiety depression                Physical  Exam  General: Well nourished and Cooperative    Airway:  Mallampati: II   Mouth Opening: Normal  TM Distance: Normal    Dental:  Intact    Chest/Lungs:  Clear to auscultation, Normal Respiratory Rate    Heart:  Rate: Normal  Rhythm: Regular Rhythm        Anesthesia Plan  Type of Anesthesia, risks & benefits discussed:    Anesthesia Type: Gen ETT, Gen Natural Airway, MAC  Intra-op Monitoring Plan: Standard ASA Monitors  Post Op Pain Control Plan: multimodal analgesia  Induction:  IV  Informed Consent: Informed consent signed with the Patient and all parties understand the risks and agree with anesthesia plan.  All questions answered.   ASA Score: 3    Ready For Surgery From Anesthesia Perspective.     .

## 2024-04-09 ENCOUNTER — PATIENT MESSAGE (OUTPATIENT)
Dept: PSYCHIATRY | Facility: CLINIC | Age: 44
End: 2024-04-09
Payer: COMMERCIAL

## 2024-04-15 LAB
FINAL PATHOLOGIC DIAGNOSIS: NORMAL
GROSS: NORMAL
Lab: NORMAL

## 2024-04-26 ENCOUNTER — OFFICE VISIT (OUTPATIENT)
Dept: URGENT CARE | Facility: CLINIC | Age: 44
End: 2024-04-26
Payer: COMMERCIAL

## 2024-04-26 VITALS
WEIGHT: 209 LBS | OXYGEN SATURATION: 100 % | HEIGHT: 61 IN | HEART RATE: 82 BPM | TEMPERATURE: 98 F | RESPIRATION RATE: 18 BRPM | SYSTOLIC BLOOD PRESSURE: 117 MMHG | DIASTOLIC BLOOD PRESSURE: 82 MMHG | BODY MASS INDEX: 39.46 KG/M2

## 2024-04-26 DIAGNOSIS — N39.0 COMPLICATED UTI (URINARY TRACT INFECTION): Primary | ICD-10-CM

## 2024-04-26 DIAGNOSIS — R10.9 ACUTE RIGHT FLANK PAIN: ICD-10-CM

## 2024-04-26 LAB
BILIRUB UR QL STRIP: NEGATIVE
GLUCOSE UR QL STRIP: NEGATIVE
KETONES UR QL STRIP: NEGATIVE
LEUKOCYTE ESTERASE UR QL STRIP: NEGATIVE
PH, POC UA: 5.5 (ref 5–8)
POC BLOOD, URINE: NEGATIVE
POC NITRATES, URINE: NEGATIVE
PROT UR QL STRIP: NEGATIVE
SP GR UR STRIP: 1 (ref 1–1.03)
UROBILINOGEN UR STRIP-ACNC: NORMAL (ref 0.1–1.1)

## 2024-04-26 PROCEDURE — 99213 OFFICE O/P EST LOW 20 MIN: CPT | Mod: 25,S$GLB,, | Performed by: NURSE PRACTITIONER

## 2024-04-26 PROCEDURE — 96372 THER/PROPH/DIAG INJ SC/IM: CPT | Mod: S$GLB,,, | Performed by: FAMILY MEDICINE

## 2024-04-26 PROCEDURE — 81003 URINALYSIS AUTO W/O SCOPE: CPT | Mod: QW,S$GLB,, | Performed by: NURSE PRACTITIONER

## 2024-04-26 PROCEDURE — 87086 URINE CULTURE/COLONY COUNT: CPT | Performed by: NURSE PRACTITIONER

## 2024-04-26 RX ORDER — KETOROLAC TROMETHAMINE 10 MG/1
10 TABLET, FILM COATED ORAL EVERY 6 HOURS
Qty: 20 TABLET | Refills: 0 | Status: SHIPPED | OUTPATIENT
Start: 2024-04-26 | End: 2024-05-01

## 2024-04-26 RX ORDER — CEFTRIAXONE 1 G/1
1 INJECTION, POWDER, FOR SOLUTION INTRAMUSCULAR; INTRAVENOUS
Status: COMPLETED | OUTPATIENT
Start: 2024-04-26 | End: 2024-04-26

## 2024-04-26 RX ORDER — CIPROFLOXACIN 500 MG/1
500 TABLET ORAL EVERY 12 HOURS
Qty: 10 TABLET | Refills: 0 | Status: SHIPPED | OUTPATIENT
Start: 2024-04-26 | End: 2024-05-01

## 2024-04-26 RX ADMIN — CEFTRIAXONE 1 G: 1 INJECTION, POWDER, FOR SOLUTION INTRAMUSCULAR; INTRAVENOUS at 05:04

## 2024-04-26 NOTE — PROGRESS NOTES
"Subjective:      Patient ID: Laura Fitzpatrick is a 43 y.o. female.    Vitals:  height is 5' 1" (1.549 m) and weight is 94.8 kg (209 lb). Her oral temperature is 98 °F (36.7 °C). Her blood pressure is 117/82 and her pulse is 82. Her respiration is 18 and oxygen saturation is 100%.     Chief Complaint: Back Pain    43-year-old female presents to clinic for evaluation of right-sided flank pain, along with urinary urgency and discomfort for the past 3 days.  She reports taking ibuprofen, increasing water intake, adding cranberry juice.  She denies nausea/vomiting.  She denies fever.  She is awake and alert, behavior appropriate to situation, no acute distress noted on today's visit.    Back Pain  This is a new problem. The current episode started in the past 7 days. The problem is unchanged. The quality of the pain is described as stabbing. The pain does not radiate. The pain is at a severity of 6/10. The pain is moderate. The pain is The same all the time. The symptoms are aggravated by bending, lying down, sitting and standing. Associated symptoms include dysuria. Pertinent negatives include no abdominal pain, bladder incontinence, bowel incontinence, chest pain, fever, headaches, leg pain, numbness, paresis, paresthesias, pelvic pain, perianal numbness, tingling, weakness or weight loss. Treatments tried: ibuprofen. The treatment provided mild relief.       Constitution: Negative for fever.   Cardiovascular:  Negative for chest pain.   Gastrointestinal:  Negative for abdominal pain and bowel incontinence.   Genitourinary:  Positive for dysuria, frequency and flank pain. Negative for urgency, bladder incontinence and pelvic pain.   Musculoskeletal:  Positive for back pain.   Skin:  Negative for erythema.   Neurological:  Negative for headaches and numbness.      Objective:     Physical Exam   Constitutional: She is oriented to person, place, and time. She appears well-developed.  Non-toxic appearance. She does not " appear ill.   HENT:   Head: Normocephalic and atraumatic. Head is without abrasion, without contusion and without laceration.   Ears:   Right Ear: External ear normal.   Left Ear: External ear normal.   Nose: Nose normal.   Mouth/Throat: Oropharynx is clear and moist and mucous membranes are normal.   Eyes: Conjunctivae, EOM and lids are normal.   Neck: Trachea normal and phonation normal. Neck supple.   Cardiovascular: Normal rate.   Pulmonary/Chest: Effort normal. No respiratory distress.   Abdominal: Normal appearance. There is right CVA tenderness.   Musculoskeletal: Normal range of motion.         General: Normal range of motion.   Neurological: She is alert and oriented to person, place, and time.   Skin: Skin is warm, dry, intact and no rash. Capillary refill takes less than 2 seconds. No abrasion, No burn, No bruising, No erythema and No ecchymosis   Psychiatric: Her speech is normal and behavior is normal. Judgment and thought content normal.   Nursing note and vitals reviewed.    Results for orders placed or performed in visit on 04/26/24   POCT Urinalysis, Dipstick, Automated, W/O Scope   Result Value Ref Range    POC Blood, Urine Negative Negative    POC Bilirubin, Urine Negative Negative    POC Urobilinogen, Urine norm 0.1 - 1.1    POC Ketones, Urine Negative Negative    POC Protein, Urine Negative Negative    POC Nitrates, Urine Negative Negative    POC Glucose, Urine Negative Negative    pH, UA 5.5 5 - 8    POC Specific Gravity, Urine 1.005 1.003 - 1.029    POC Leukocytes, Urine Negative Negative         Assessment:     1. Complicated UTI (urinary tract infection)    2. Acute right flank pain        Plan:       Complicated UTI (urinary tract infection)  -     cefTRIAXone injection 1 g  -     ciprofloxacin HCl (CIPRO) 500 MG tablet; Take 1 tablet (500 mg total) by mouth every 12 (twelve) hours. for 5 days  Dispense: 10 tablet; Refill: 0    Acute right flank pain  -     POCT Urinalysis, Dipstick,  Automated, W/O Scope  -     ketorolac (TORADOL) 10 mg tablet; Take 1 tablet (10 mg total) by mouth every 6 (six) hours. for 5 days  Dispense: 20 tablet; Refill: 0  -     Urine culture      - Patient with normal urinalysis, however CVA tenderness on exam, with urinary complaints, will cover for complicated UTI.  Considered kidney stone, as this was patient's biggest concern, however no red blood cells noted in urine, no recent history of kidney stones, will cover with Toradol, advised ER precautions for worsening symptoms.  Considered shingles given location of pain, no visible rash on exam.  Advised patient to continue to monitor.  Follow-up with PCP as directed.  Patient verbalized understanding and is in agreement plan.    Patient Instructions   - Follow up with your PCP or specialty clinic as directed in the next 1-2 weeks if not improved or as needed.  You can call [phone] to schedule an appointment with the appropriate provider.    - Go to the ER or seek medical attention immediately if you develop new or worsening symptoms.    - You must understand that you have received an Urgent Care treatment only and that you may be released before all of your medical problems are known or treated.   - You, the patient, will arrange for follow up care as instructed.   - If your condition worsens or fails to improve we recommend that you receive another evaluation at the ER immediately or contact your PCP to discuss your concerns or return here.

## 2024-04-26 NOTE — PATIENT INSTRUCTIONS
- Follow up with your PCP or specialty clinic as directed in the next 1-2 weeks if not improved or as needed.  You can call [phone] to schedule an appointment with the appropriate provider.    - Go to the ER or seek medical attention immediately if you develop new or worsening symptoms.    - You must understand that you have received an Urgent Care treatment only and that you may be released before all of your medical problems are known or treated.   - You, the patient, will arrange for follow up care as instructed.   - If your condition worsens or fails to improve we recommend that you receive another evaluation at the ER immediately or contact your PCP to discuss your concerns or return here.

## 2024-04-28 LAB — BACTERIA UR CULT: NORMAL

## 2024-05-27 ENCOUNTER — LAB VISIT (OUTPATIENT)
Dept: LAB | Facility: HOSPITAL | Age: 44
End: 2024-05-27
Attending: FAMILY MEDICINE
Payer: COMMERCIAL

## 2024-05-27 ENCOUNTER — OFFICE VISIT (OUTPATIENT)
Dept: FAMILY MEDICINE | Facility: CLINIC | Age: 44
End: 2024-05-27
Payer: COMMERCIAL

## 2024-05-27 VITALS
BODY MASS INDEX: 39.32 KG/M2 | OXYGEN SATURATION: 98 % | HEART RATE: 86 BPM | SYSTOLIC BLOOD PRESSURE: 118 MMHG | DIASTOLIC BLOOD PRESSURE: 86 MMHG | WEIGHT: 208.25 LBS | HEIGHT: 61 IN

## 2024-05-27 DIAGNOSIS — E66.09 CLASS 2 OBESITY DUE TO EXCESS CALORIES WITHOUT SERIOUS COMORBIDITY WITH BODY MASS INDEX (BMI) OF 39.0 TO 39.9 IN ADULT: ICD-10-CM

## 2024-05-27 DIAGNOSIS — Z00.00 ANNUAL PHYSICAL EXAM: Primary | ICD-10-CM

## 2024-05-27 DIAGNOSIS — K21.9 GASTROESOPHAGEAL REFLUX DISEASE, UNSPECIFIED WHETHER ESOPHAGITIS PRESENT: ICD-10-CM

## 2024-05-27 DIAGNOSIS — Z00.00 ANNUAL PHYSICAL EXAM: ICD-10-CM

## 2024-05-27 DIAGNOSIS — Z76.89 ENCOUNTER TO ESTABLISH CARE WITH NEW DOCTOR: ICD-10-CM

## 2024-05-27 DIAGNOSIS — F41.9 ANXIETY: ICD-10-CM

## 2024-05-27 PROBLEM — M25.512 ACUTE PAIN OF LEFT SHOULDER: Status: RESOLVED | Noted: 2020-04-07 | Resolved: 2024-05-27

## 2024-05-27 LAB
ALBUMIN SERPL BCP-MCNC: 3.8 G/DL (ref 3.5–5.2)
ALP SERPL-CCNC: 67 U/L (ref 55–135)
ALT SERPL W/O P-5'-P-CCNC: 32 U/L (ref 10–44)
ANION GAP SERPL CALC-SCNC: 10 MMOL/L (ref 8–16)
AST SERPL-CCNC: 22 U/L (ref 10–40)
BASOPHILS # BLD AUTO: 0.02 K/UL (ref 0–0.2)
BASOPHILS NFR BLD: 0.3 % (ref 0–1.9)
BILIRUB SERPL-MCNC: 0.4 MG/DL (ref 0.1–1)
BUN SERPL-MCNC: 14 MG/DL (ref 6–20)
CALCIUM SERPL-MCNC: 9.1 MG/DL (ref 8.7–10.5)
CHLORIDE SERPL-SCNC: 106 MMOL/L (ref 95–110)
CHOLEST SERPL-MCNC: 180 MG/DL (ref 120–199)
CHOLEST/HDLC SERPL: 3.8 {RATIO} (ref 2–5)
CO2 SERPL-SCNC: 23 MMOL/L (ref 23–29)
CREAT SERPL-MCNC: 0.8 MG/DL (ref 0.5–1.4)
DIFFERENTIAL METHOD BLD: NORMAL
EOSINOPHIL # BLD AUTO: 0.1 K/UL (ref 0–0.5)
EOSINOPHIL NFR BLD: 2.3 % (ref 0–8)
ERYTHROCYTE [DISTWIDTH] IN BLOOD BY AUTOMATED COUNT: 13.4 % (ref 11.5–14.5)
EST. GFR  (NO RACE VARIABLE): >60 ML/MIN/1.73 M^2
ESTIMATED AVG GLUCOSE: 111 MG/DL (ref 68–131)
GLUCOSE SERPL-MCNC: 94 MG/DL (ref 70–110)
HBA1C MFR BLD: 5.5 % (ref 4–5.6)
HCT VFR BLD AUTO: 42.5 % (ref 37–48.5)
HDLC SERPL-MCNC: 47 MG/DL (ref 40–75)
HDLC SERPL: 26.1 % (ref 20–50)
HGB BLD-MCNC: 13.7 G/DL (ref 12–16)
IMM GRANULOCYTES # BLD AUTO: 0.02 K/UL (ref 0–0.04)
IMM GRANULOCYTES NFR BLD AUTO: 0.3 % (ref 0–0.5)
LDLC SERPL CALC-MCNC: 118.4 MG/DL (ref 63–159)
LYMPHOCYTES # BLD AUTO: 1.1 K/UL (ref 1–4.8)
LYMPHOCYTES NFR BLD: 18.6 % (ref 18–48)
MCH RBC QN AUTO: 27.6 PG (ref 27–31)
MCHC RBC AUTO-ENTMCNC: 32.2 G/DL (ref 32–36)
MCV RBC AUTO: 86 FL (ref 82–98)
MONOCYTES # BLD AUTO: 0.6 K/UL (ref 0.3–1)
MONOCYTES NFR BLD: 9.8 % (ref 4–15)
NEUTROPHILS # BLD AUTO: 3.9 K/UL (ref 1.8–7.7)
NEUTROPHILS NFR BLD: 68.7 % (ref 38–73)
NONHDLC SERPL-MCNC: 133 MG/DL
NRBC BLD-RTO: 0 /100 WBC
PLATELET # BLD AUTO: 232 K/UL (ref 150–450)
PMV BLD AUTO: 10.2 FL (ref 9.2–12.9)
POTASSIUM SERPL-SCNC: 4.4 MMOL/L (ref 3.5–5.1)
PROT SERPL-MCNC: 7.7 G/DL (ref 6–8.4)
RBC # BLD AUTO: 4.96 M/UL (ref 4–5.4)
SODIUM SERPL-SCNC: 139 MMOL/L (ref 136–145)
TRIGL SERPL-MCNC: 73 MG/DL (ref 30–150)
TSH SERPL DL<=0.005 MIU/L-ACNC: 1.28 UIU/ML (ref 0.4–4)
WBC # BLD AUTO: 5.74 K/UL (ref 3.9–12.7)

## 2024-05-27 PROCEDURE — 99999 PR PBB SHADOW E&M-EST. PATIENT-LVL III: CPT | Mod: PBBFAC,,, | Performed by: FAMILY MEDICINE

## 2024-05-27 PROCEDURE — 84443 ASSAY THYROID STIM HORMONE: CPT | Performed by: FAMILY MEDICINE

## 2024-05-27 PROCEDURE — 3074F SYST BP LT 130 MM HG: CPT | Mod: CPTII,S$GLB,, | Performed by: FAMILY MEDICINE

## 2024-05-27 PROCEDURE — 36415 COLL VENOUS BLD VENIPUNCTURE: CPT | Performed by: FAMILY MEDICINE

## 2024-05-27 PROCEDURE — 80061 LIPID PANEL: CPT | Performed by: FAMILY MEDICINE

## 2024-05-27 PROCEDURE — 85025 COMPLETE CBC W/AUTO DIFF WBC: CPT | Performed by: FAMILY MEDICINE

## 2024-05-27 PROCEDURE — 3008F BODY MASS INDEX DOCD: CPT | Mod: CPTII,S$GLB,, | Performed by: FAMILY MEDICINE

## 2024-05-27 PROCEDURE — 99396 PREV VISIT EST AGE 40-64: CPT | Mod: S$GLB,,, | Performed by: FAMILY MEDICINE

## 2024-05-27 PROCEDURE — 3079F DIAST BP 80-89 MM HG: CPT | Mod: CPTII,S$GLB,, | Performed by: FAMILY MEDICINE

## 2024-05-27 PROCEDURE — 83036 HEMOGLOBIN GLYCOSYLATED A1C: CPT | Performed by: FAMILY MEDICINE

## 2024-05-27 PROCEDURE — 80053 COMPREHEN METABOLIC PANEL: CPT | Performed by: FAMILY MEDICINE

## 2024-05-27 PROCEDURE — 1159F MED LIST DOCD IN RCRD: CPT | Mod: CPTII,S$GLB,, | Performed by: FAMILY MEDICINE

## 2024-05-27 NOTE — PROGRESS NOTES
(Portions of this note were dictated using voice recognition software and may contain dictation related errors in spelling/grammar/syntax not found on text review)    CC:   Chief Complaint   Patient presents with    [facility]     Pt is fasting    Annual Exam       HPI: 44 y.o. female presented to [facility] as new pt for routine annual check up and labs. She has medical history significant for GERD, allergic rhinitis, GEOVANNA, cervical radiculopathy.    She takes Protonix 40 mg for acid reflux, symptoms are stable. She had EGD in 4/23, was normal.    She has history of anxiety and depression, follows up with psychiatrist in the past, has tried different antidepressant which were not effective, currently off of any medications.    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    She does not smoke, has no toxic habits.    She is due for annual labs.    She is currently not doing any exercise but plans to start once schedule gets betetr.    No other symptoms or concerns.    Past Medical History:   Diagnosis Date    Abnormal Pap smear 12 years ago    Cryotherapy    Abnormal Pap smear of cervix     Acute pain of left shoulder 04/07/2020    Allergy     Anxiety     Cervical radiculopathy 04/29/2015    Cervical radiculopathy 04/29/2015    Depression     Enlarged thyroid gland 01/26/2015    GERD (gastroesophageal reflux disease)     History of psychiatric hospitalization     Hx of psychiatric care     Obesity (BMI 30-39.9)     Psychiatric problem     Right-sided carotid artery disease 02/08/2016    Therapy        Past Surgical History:   Procedure Laterality Date    APPENDECTOMY  2011    ESOPHAGOGASTRODUODENOSCOPY N/A 9/20/2018    Procedure: EGD (ESOPHAGOGASTRODUODENOSCOPY);  Surgeon: Karl Judge MD;  Location: [facility] ([address] [place] [address]);  Service: Endoscopy;   Laterality: N/A;    ESOPHAGOGASTRODUODENOSCOPY N/A 4/8/2024    Procedure: EGD (ESOPHAGOGASTRODUODENOSCOPY);  Surgeon: Miguel A Mo MD;  Location: [facility];  Service: Endoscopy;  Laterality: N/A;    INGUINAL HERNIA REPAIR Right 2017    MYRINGOTOMY W/ TUBES         Family History   Problem Relation Name Age of Onset    Heart disease Mother  40    Diabetes Mother      Depression Mother      Anxiety disorder Mother      Obesity Mother      Hypertension Father      Diabetes Father      Obesity Father      Kidney disease Father      Gestational diabetes Sister twin     Asthma Sister twin         as a child    Heart disease Maternal Grandmother      Diabetes Maternal Grandmother      Diabetes Maternal Grandfather      Cancer Paternal Grandmother  58        Breast    Diabetes Paternal Grandmother      Breast cancer Paternal Grandmother      Stroke Neg Hx      Colon cancer Neg Hx      Ovarian cancer Neg Hx      Esophageal cancer Neg Hx         Social History     Tobacco Use    Smoking status: Never     Passive exposure: Never    Smokeless tobacco: Never   Substance Use Topics    Alcohol use: Yes     Comment: 2 drinks twice/month    Drug use: No       Lab Results   Component Value Date    WBC 6.02 04/02/2024    HGB 14.0 04/02/2024    HCT 43.5 04/02/2024    MCV 87 04/02/2024     04/02/2024    CHOL 167 06/16/2021    TRIG 78 06/16/2021    HDL 42 06/16/2021    ALT 38 04/02/2024    AST 24 04/02/2024    BILITOT 0.4 04/02/2024    ALKPHOS 75 04/02/2024     04/02/2024    K 4.6 04/02/2024     04/02/2024    CREATININE 0.8 04/02/2024    ESTGFRAFRICA >60.0 06/16/2021    EGFRNONAA >60.0 06/16/2021    CALCIUM 9.5 04/02/2024    ALBUMIN 4.0 04/02/2024    BUN 11 04/02/2024    CO2 24 04/02/2024    TSH 1.396 06/16/2021    INR 1.1 02/15/2011    HGBA1C 5.1 06/16/2021    LDLCALC 109.4 06/16/2021    GLU 85 04/02/2024    [id number] 33 06/16/2021             Vital signs reviewed  PE:   APPEARANCE: Well nourished, well  developed, in no acute distress.    HEAD: Normocephalic, atraumatic.  EYES: EOMI.  Conjunctivae noninjected.  NOSE: Mucosa pink. Airway clear.  NECK: Supple with no cervical lymphadenopathy.    CHEST: Good inspiratory effort. Lungs clear to auscultation with no wheezes or crackles.  CARDIOVASCULAR: Normal S1, S2. No rubs, murmurs, or gallops.  ABDOMEN: Bowel sounds normal. Not distended. Soft. No tenderness or masses. No organomegaly.  EXTREMITIES: No edema, cyanosis, or clubbing.    Review of Systems   Constitutional:  Negative for chills, fatigue and fever.   HENT: Negative.     Respiratory:  Negative for cough, shortness of breath and wheezing.    Cardiovascular:  Negative for chest pain, palpitations and leg swelling.   Gastrointestinal: Negative.    Genitourinary: Negative.    Neurological: Negative.    Psychiatric/Behavioral: Negative.     All other systems reviewed and are negative.      IMPRESSION  1. Annual physical exam    2. Gastroesophageal reflux disease, unspecified whether esophagitis present    3. Encounter to establish care with new doctor    4. Class 2 obesity due to excess calories without serious comorbidity with body mass index (BMI) of 39.0 to 39.9 in adult    5. Anxiety            PLAN      1. Annual physical exam    - Lipid Panel; Future  - Hemoglobin A1C; Future  - CBC Auto Differential; Future  - Comprehensive Metabolic Panel; Future  - TSH; Future      2. Gastroesophageal reflux disease, unspecified whether esophagitis present    Stable    Continue Protonix    Avoid spiny and acidic foods      3. Encounter to establish care with new doctor          4. Class 2 obesity due to excess calories without serious comorbidity with body mass index (BMI) of 39.0 to 39.9 in adult    Counseling provided on healthy lifestyle, encouraged to do moderate intensity regular exercise 30 minutes every day 5 days a week, to include vegetables and fruits and cut back on saturated fats and carbohydrates.           5. Anxiety    To follow up with psych    Advised to consider therapy        SCREENINGS      Immunizations:     UTD with Tdap    UTD with flu vaccine    UTD with Covid vaccines        Age/demographic appropriate health maintenance:    UTD with mammogram- due in 10/24    UTD with pap smear till 2025      Health Maintenance Due   Topic Date Due    COVID-19 Vaccine (5 - 2023-24 season) 09/01/2023           Spent adequate time in obtaining history and explaining differentials     30 minutes spent during this visit of which greater than 50% devoted to face-face counseling and coordination of care regarding diagnosis and management plan       Tami Gregorio   5/27/2024

## 2024-07-10 ENCOUNTER — OFFICE VISIT (OUTPATIENT)
Dept: URGENT CARE | Facility: CLINIC | Age: 44
End: 2024-07-10
Payer: COMMERCIAL

## 2024-07-10 VITALS
OXYGEN SATURATION: 98 % | DIASTOLIC BLOOD PRESSURE: 80 MMHG | WEIGHT: 208 LBS | BODY MASS INDEX: 39.27 KG/M2 | RESPIRATION RATE: 18 BRPM | HEART RATE: 81 BPM | TEMPERATURE: 99 F | HEIGHT: 61 IN | SYSTOLIC BLOOD PRESSURE: 139 MMHG

## 2024-07-10 DIAGNOSIS — J20.9 ACUTE BRONCHITIS, UNSPECIFIED ORGANISM: Primary | ICD-10-CM

## 2024-07-10 LAB
CTP QC/QA: YES
SARS-COV-2 AG RESP QL IA.RAPID: NEGATIVE

## 2024-07-10 PROCEDURE — 99213 OFFICE O/P EST LOW 20 MIN: CPT | Mod: 25,S$GLB,, | Performed by: FAMILY MEDICINE

## 2024-07-10 PROCEDURE — 87811 SARS-COV-2 COVID19 W/OPTIC: CPT | Mod: QW,S$GLB,, | Performed by: FAMILY MEDICINE

## 2024-07-10 PROCEDURE — 96372 THER/PROPH/DIAG INJ SC/IM: CPT | Mod: S$GLB,,, | Performed by: FAMILY MEDICINE

## 2024-07-10 PROCEDURE — 71046 X-RAY EXAM CHEST 2 VIEWS: CPT | Mod: FY,S$GLB,, | Performed by: RADIOLOGY

## 2024-07-10 RX ORDER — BETAMETHASONE SODIUM PHOSPHATE AND BETAMETHASONE ACETATE 3; 3 MG/ML; MG/ML
6 INJECTION, SUSPENSION INTRA-ARTICULAR; INTRALESIONAL; INTRAMUSCULAR; SOFT TISSUE
Status: COMPLETED | OUTPATIENT
Start: 2024-07-10 | End: 2024-07-10

## 2024-07-10 RX ORDER — PROMETHAZINE HYDROCHLORIDE AND DEXTROMETHORPHAN HYDROBROMIDE 6.25; 15 MG/5ML; MG/5ML
5 SYRUP ORAL NIGHTLY PRN
Qty: 118 ML | Refills: 0 | Status: SHIPPED | OUTPATIENT
Start: 2024-07-10 | End: 2024-07-20

## 2024-07-10 RX ORDER — DOXYCYCLINE 100 MG/1
100 CAPSULE ORAL 2 TIMES DAILY
Qty: 14 CAPSULE | Refills: 0 | Status: SHIPPED | OUTPATIENT
Start: 2024-07-10 | End: 2024-07-17

## 2024-07-10 RX ORDER — BENZONATATE 100 MG/1
100 CAPSULE ORAL EVERY 6 HOURS PRN
Qty: 30 CAPSULE | Refills: 1 | Status: SHIPPED | OUTPATIENT
Start: 2024-07-10 | End: 2025-07-10

## 2024-07-10 RX ORDER — ALBUTEROL SULFATE 90 UG/1
2 AEROSOL, METERED RESPIRATORY (INHALATION) EVERY 6 HOURS PRN
Qty: 18 G | Refills: 0 | Status: SHIPPED | OUTPATIENT
Start: 2024-07-10 | End: 2025-07-10

## 2024-07-10 RX ADMIN — BETAMETHASONE SODIUM PHOSPHATE AND BETAMETHASONE ACETATE 6 MG: 3; 3 INJECTION, SUSPENSION INTRA-ARTICULAR; INTRALESIONAL; INTRAMUSCULAR; SOFT TISSUE at 01:07

## 2024-07-10 NOTE — PROGRESS NOTES
"Subjective:      Patient ID: Laura Fitzpatrick is a 44 y.o. female.    Vitals:  height is 5' 1" (1.549 m) and weight is 94.3 kg (208 lb). Her oral temperature is 99.3 °F (37.4 °C). Her blood pressure is 139/80 and her pulse is 81. Her respiration is 18 and oxygen saturation is 98%.     Chief Complaint: Cough (Persistent cough for several weeks - Entered by patient)    This is a 44 y.o. female who presents today with a chief complaint of  cough x 6 weeks     Cough  The current episode started 1 to 4 weeks ago. The cough is Productive of sputum. Associated symptoms include postnasal drip, shortness of breath and wheezing. Pertinent negatives include no chest pain, chills, ear congestion, ear pain, fever, headaches, heartburn, hemoptysis, myalgias, nasal congestion, rash, rhinorrhea, sore throat, sweats or weight loss. She has tried OTC cough suppressant and prescription cough suppressant for the symptoms. Her past medical history is significant for environmental allergies. There is no history of asthma, bronchitis, COPD or pneumonia.     Constitution: Negative for chills and fever.   HENT:  Positive for postnasal drip. Negative for ear pain and sore throat.    Cardiovascular:  Negative for chest pain.   Respiratory:  Positive for cough, shortness of breath and wheezing. Negative for bloody sputum.    Gastrointestinal:  Negative for heartburn.   Musculoskeletal:  Negative for muscle ache.   Skin:  Negative for rash.   Allergic/Immunologic: Positive for environmental allergies.   Neurological:  Negative for headaches.      Objective:     Physical Exam   Constitutional: She does not appear ill. No distress. obesity  HENT:   Head: Normocephalic and atraumatic.   Nose: Congestion present.   Mouth/Throat: Mucous membranes are moist. Posterior oropharyngeal erythema present.   Eyes: Pupils are equal, round, and reactive to light. Extraocular movement intact   Neck: Neck supple.   Cardiovascular: Normal rate, regular rhythm, " normal heart sounds and normal pulses.   Pulmonary/Chest: Effort normal. She has rhonchi.   Abdominal: Normal appearance.   Neurological: She is alert.   Nursing note and vitals reviewed.  Results for orders placed or performed in visit on 07/10/24   SARS Coronavirus 2 Antigen, POCT Manual Read   Result Value Ref Range    SARS Coronavirus 2 Antigen Negative Negative     Acceptable Yes       Assessment:     1. Acute bronchitis, unspecified organism        Plan:       Acute bronchitis, unspecified organism  -     SARS Coronavirus 2 Antigen, POCT Manual Read  -     X-Ray Chest PA And Lateral  -     betamethasone acetate-betamethasone sodium phosphate injection 6 mg  -     doxycycline (VIBRAMYCIN) 100 MG Cap; Take 1 capsule (100 mg total) by mouth 2 (two) times daily. for 7 days  Dispense: 14 capsule; Refill: 0  -     benzonatate (TESSALON PERLES) 100 MG capsule; Take 1 capsule (100 mg total) by mouth every 6 (six) hours as needed for Cough.  Dispense: 30 capsule; Refill: 1  -     promethazine-dextromethorphan (PROMETHAZINE-DM) 6.25-15 mg/5 mL Syrp; Take 5 mLs by mouth nightly as needed (cough).  Dispense: 118 mL; Refill: 0  -     albuterol (PROAIR HFA) 90 mcg/actuation inhaler; Inhale 2 puffs into the lungs every 6 (six) hours as needed for Wheezing. Rescue  Dispense: 18 g; Refill: 0                     mild anxeity smiling at times concrete limited to low end of fair

## 2024-08-03 DIAGNOSIS — K21.9 GASTROESOPHAGEAL REFLUX DISEASE, UNSPECIFIED WHETHER ESOPHAGITIS PRESENT: ICD-10-CM

## 2024-08-05 RX ORDER — PANTOPRAZOLE SODIUM 40 MG/1
40 TABLET, DELAYED RELEASE ORAL
Qty: 30 TABLET | Refills: 3 | Status: SHIPPED | OUTPATIENT
Start: 2024-08-05

## 2025-03-30 DIAGNOSIS — K21.9 GASTROESOPHAGEAL REFLUX DISEASE, UNSPECIFIED WHETHER ESOPHAGITIS PRESENT: ICD-10-CM

## 2025-03-31 RX ORDER — PANTOPRAZOLE SODIUM 40 MG/1
40 TABLET, DELAYED RELEASE ORAL
Qty: 30 TABLET | Refills: 3 | Status: SHIPPED | OUTPATIENT
Start: 2025-03-31

## 2025-05-29 ENCOUNTER — OFFICE VISIT (OUTPATIENT)
Dept: FAMILY MEDICINE | Facility: CLINIC | Age: 45
End: 2025-05-29
Payer: COMMERCIAL

## 2025-05-29 ENCOUNTER — LAB VISIT (OUTPATIENT)
Dept: LAB | Facility: HOSPITAL | Age: 45
End: 2025-05-29
Attending: FAMILY MEDICINE
Payer: COMMERCIAL

## 2025-05-29 ENCOUNTER — RESULTS FOLLOW-UP (OUTPATIENT)
Dept: FAMILY MEDICINE | Facility: CLINIC | Age: 45
End: 2025-05-29

## 2025-05-29 ENCOUNTER — PATIENT MESSAGE (OUTPATIENT)
Dept: FAMILY MEDICINE | Facility: CLINIC | Age: 45
End: 2025-05-29

## 2025-05-29 VITALS
TEMPERATURE: 99 F | DIASTOLIC BLOOD PRESSURE: 86 MMHG | SYSTOLIC BLOOD PRESSURE: 124 MMHG | HEIGHT: 61 IN | BODY MASS INDEX: 40.71 KG/M2 | WEIGHT: 215.63 LBS | HEART RATE: 93 BPM | OXYGEN SATURATION: 98 %

## 2025-05-29 DIAGNOSIS — K21.9 GASTROESOPHAGEAL REFLUX DISEASE, UNSPECIFIED WHETHER ESOPHAGITIS PRESENT: ICD-10-CM

## 2025-05-29 DIAGNOSIS — E66.813 CLASS 3 SEVERE OBESITY DUE TO EXCESS CALORIES WITHOUT SERIOUS COMORBIDITY WITH BODY MASS INDEX (BMI) OF 40.0 TO 44.9 IN ADULT: ICD-10-CM

## 2025-05-29 DIAGNOSIS — Z00.00 ANNUAL PHYSICAL EXAM: Primary | ICD-10-CM

## 2025-05-29 DIAGNOSIS — M72.2 PLANTAR FASCIITIS OF RIGHT FOOT: ICD-10-CM

## 2025-05-29 DIAGNOSIS — Z23 ENCOUNTER FOR IMMUNIZATION: ICD-10-CM

## 2025-05-29 DIAGNOSIS — Z00.00 ANNUAL PHYSICAL EXAM: ICD-10-CM

## 2025-05-29 DIAGNOSIS — E66.01 CLASS 3 SEVERE OBESITY DUE TO EXCESS CALORIES WITHOUT SERIOUS COMORBIDITY WITH BODY MASS INDEX (BMI) OF 40.0 TO 44.9 IN ADULT: ICD-10-CM

## 2025-05-29 DIAGNOSIS — E66.9 OBESITY, UNSPECIFIED CLASS, UNSPECIFIED OBESITY TYPE, UNSPECIFIED WHETHER SERIOUS COMORBIDITY PRESENT: Primary | ICD-10-CM

## 2025-05-29 LAB
ABSOLUTE EOSINOPHIL (OHS): 0.11 K/UL
ABSOLUTE MONOCYTE (OHS): 0.44 K/UL (ref 0.3–1)
ABSOLUTE NEUTROPHIL COUNT (OHS): 3.34 K/UL (ref 1.8–7.7)
ALBUMIN SERPL BCP-MCNC: 4.1 G/DL (ref 3.5–5.2)
ALP SERPL-CCNC: 74 UNIT/L (ref 40–150)
ALT SERPL W/O P-5'-P-CCNC: 57 UNIT/L (ref 10–44)
ANION GAP (OHS): 13 MMOL/L (ref 8–16)
AST SERPL-CCNC: 30 UNIT/L (ref 11–45)
BASOPHILS # BLD AUTO: 0.02 K/UL
BASOPHILS NFR BLD AUTO: 0.4 %
BILIRUB SERPL-MCNC: 0.5 MG/DL (ref 0.1–1)
BUN SERPL-MCNC: 15 MG/DL (ref 6–20)
CALCIUM SERPL-MCNC: 9.6 MG/DL (ref 8.7–10.5)
CHLORIDE SERPL-SCNC: 102 MMOL/L (ref 95–110)
CHOLEST SERPL-MCNC: 195 MG/DL (ref 120–199)
CHOLEST/HDLC SERPL: 3.7 {RATIO} (ref 2–5)
CO2 SERPL-SCNC: 24 MMOL/L (ref 23–29)
CREAT SERPL-MCNC: 0.8 MG/DL (ref 0.5–1.4)
EAG (OHS): 131 MG/DL (ref 68–131)
ERYTHROCYTE [DISTWIDTH] IN BLOOD BY AUTOMATED COUNT: 13.2 % (ref 11.5–14.5)
GFR SERPLBLD CREATININE-BSD FMLA CKD-EPI: >60 ML/MIN/1.73/M2
GLUCOSE SERPL-MCNC: 96 MG/DL (ref 70–110)
HBA1C MFR BLD: 6.2 % (ref 4–5.6)
HCT VFR BLD AUTO: 42.6 % (ref 37–48.5)
HDLC SERPL-MCNC: 53 MG/DL (ref 40–75)
HDLC SERPL: 27.2 % (ref 20–50)
HGB BLD-MCNC: 14 GM/DL (ref 12–16)
IMM GRANULOCYTES # BLD AUTO: 0.03 K/UL (ref 0–0.04)
IMM GRANULOCYTES NFR BLD AUTO: 0.6 % (ref 0–0.5)
IRON SATN MFR SERPL: 18 % (ref 20–50)
IRON SERPL-MCNC: 78 UG/DL (ref 30–160)
LDLC SERPL CALC-MCNC: 119 MG/DL (ref 63–159)
LYMPHOCYTES # BLD AUTO: 1.14 K/UL (ref 1–4.8)
MCH RBC QN AUTO: 27.8 PG (ref 27–31)
MCHC RBC AUTO-ENTMCNC: 32.9 G/DL (ref 32–36)
MCV RBC AUTO: 85 FL (ref 82–98)
NONHDLC SERPL-MCNC: 142 MG/DL
NUCLEATED RBC (/100WBC) (OHS): 0 /100 WBC
PLATELET # BLD AUTO: 248 K/UL (ref 150–450)
PMV BLD AUTO: 10.2 FL (ref 9.2–12.9)
POTASSIUM SERPL-SCNC: 4.5 MMOL/L (ref 3.5–5.1)
PROT SERPL-MCNC: 8.6 GM/DL (ref 6–8.4)
RBC # BLD AUTO: 5.04 M/UL (ref 4–5.4)
RELATIVE EOSINOPHIL (OHS): 2.2 %
RELATIVE LYMPHOCYTE (OHS): 22.4 % (ref 18–48)
RELATIVE MONOCYTE (OHS): 8.7 % (ref 4–15)
RELATIVE NEUTROPHIL (OHS): 65.7 % (ref 38–73)
SODIUM SERPL-SCNC: 139 MMOL/L (ref 136–145)
T4 FREE SERPL-MCNC: 0.91 NG/DL (ref 0.71–1.51)
T4 FREE SERPL-MCNC: 0.91 NG/DL (ref 0.71–1.51)
TIBC SERPL-MCNC: 438 UG/DL (ref 250–450)
TRANSFERRIN SERPL-MCNC: 296 MG/DL (ref 200–375)
TRIGL SERPL-MCNC: 115 MG/DL (ref 30–150)
TSH SERPL-ACNC: 2.7 UIU/ML (ref 0.4–4)
VIT B12 SERPL-MCNC: 534 PG/ML (ref 210–950)
WBC # BLD AUTO: 5.08 K/UL (ref 3.9–12.7)

## 2025-05-29 PROCEDURE — 84443 ASSAY THYROID STIM HORMONE: CPT

## 2025-05-29 PROCEDURE — 1159F MED LIST DOCD IN RCRD: CPT | Mod: CPTII,S$GLB,, | Performed by: FAMILY MEDICINE

## 2025-05-29 PROCEDURE — 83036 HEMOGLOBIN GLYCOSYLATED A1C: CPT

## 2025-05-29 PROCEDURE — 80053 COMPREHEN METABOLIC PANEL: CPT

## 2025-05-29 PROCEDURE — 3079F DIAST BP 80-89 MM HG: CPT | Mod: CPTII,S$GLB,, | Performed by: FAMILY MEDICINE

## 2025-05-29 PROCEDURE — 3074F SYST BP LT 130 MM HG: CPT | Mod: CPTII,S$GLB,, | Performed by: FAMILY MEDICINE

## 2025-05-29 PROCEDURE — 82607 VITAMIN B-12: CPT

## 2025-05-29 PROCEDURE — 99396 PREV VISIT EST AGE 40-64: CPT | Mod: S$GLB,,, | Performed by: FAMILY MEDICINE

## 2025-05-29 PROCEDURE — 3008F BODY MASS INDEX DOCD: CPT | Mod: CPTII,S$GLB,, | Performed by: FAMILY MEDICINE

## 2025-05-29 PROCEDURE — 99999 PR PBB SHADOW E&M-EST. PATIENT-LVL IV: CPT | Mod: PBBFAC,,, | Performed by: FAMILY MEDICINE

## 2025-05-29 PROCEDURE — 84466 ASSAY OF TRANSFERRIN: CPT

## 2025-05-29 PROCEDURE — 80061 LIPID PANEL: CPT

## 2025-05-29 PROCEDURE — 85025 COMPLETE CBC W/AUTO DIFF WBC: CPT

## 2025-05-29 PROCEDURE — 36415 COLL VENOUS BLD VENIPUNCTURE: CPT

## 2025-05-29 RX ORDER — TIRZEPATIDE 2.5 MG/.5ML
2.5 INJECTION, SOLUTION SUBCUTANEOUS
Qty: 2 ML | Refills: 4 | Status: SHIPPED | OUTPATIENT
Start: 2025-05-29 | End: 2025-05-29

## 2025-05-29 RX ORDER — DICLOFENAC SODIUM 10 MG/G
2 GEL TOPICAL DAILY
Qty: 100 G | Refills: 0 | Status: SHIPPED | OUTPATIENT
Start: 2025-05-29

## 2025-05-29 NOTE — PROGRESS NOTES
(Portions of this note were dictated using voice recognition software and may contain dictation related errors in spelling/grammar/syntax not found on text review)    CC:   Chief Complaint   Patient presents with    Annual Exam    Foot Pain       HPI: 45 y.o. female presented for routine annual examination and labs.  She has medical history significant for anxiety, gastroesophageal reflux disease, obesity, cervical radiculopathy.    She is currently taking Protonix 40 mg on daily basis, stated that her symptoms are stable with taking the medication regularly    She reports having heel pain on the right side, ongoing for the past few months, pain is worse in the morning and taking 1st few steps are bad, eases up a little bit when she starts walking, reports having constant achiness, has been taking over-the-counter ibuprofen with little relief, denies having any history of trauma or fall    She has been trying to lose weight, has been doing lifestyle modification with exercise and diet but has not seen any consistent results, patient is worried about her weight as has strong family history of cardiovascular problems, her mom had heart attack at her age and she would like to prevent it, she started crying while talking about weight loss journey and would like to start a medication to help her    She is due for annual labs    She does not smoke, has no toxic habits    Denies having any other symptoms or concerns    Past Medical History:   Diagnosis Date    Abnormal Pap smear 12 years ago    Cryotherapy    Abnormal Pap smear of cervix     Acute pain of left shoulder 04/07/2020    Allergy     Anxiety     Cervical radiculopathy 04/29/2015    Cervical radiculopathy 04/29/2015    Depression     Enlarged thyroid gland 01/26/2015    GERD (gastroesophageal reflux disease)     History of psychiatric hospitalization     Hx of psychiatric care     Obesity (BMI 30-39.9)     Psychiatric problem     Right-sided carotid artery  disease 02/08/2016    Therapy        Past Surgical History:   Procedure Laterality Date    APPENDECTOMY  2011    ESOPHAGOGASTRODUODENOSCOPY N/A 9/20/2018    Procedure: EGD (ESOPHAGOGASTRODUODENOSCOPY);  Surgeon: Karl Judge MD;  Location: [facility] (4TH FLR);  Service: Endoscopy;  Laterality: N/A;    ESOPHAGOGASTRODUODENOSCOPY N/A 4/8/2024    Procedure: EGD (ESOPHAGOGASTRODUODENOSCOPY);  Surgeon: Migule A Mo MD;  Location: [facility];  Service: Endoscopy;  Laterality: N/A;    INGUINAL HERNIA REPAIR Right 2017    MYRINGOTOMY W/ TUBES         Family History   Problem Relation Name Age of Onset    Heart disease Mother  40    Diabetes Mother      Depression Mother      Anxiety disorder Mother      Obesity Mother      Hypertension Father      Diabetes Father      Obesity Father      Kidney disease Father      Gestational diabetes Sister twin     Asthma Sister twin         as a child    Heart disease Maternal Grandmother      Diabetes Maternal Grandmother      Diabetes Maternal Grandfather      Cancer Paternal Grandmother  58        Breast    Diabetes Paternal Grandmother      Breast cancer Paternal Grandmother      Stroke Neg Hx      Colon cancer Neg Hx      Ovarian cancer Neg Hx      Esophageal cancer Neg Hx         Social History[1]    Lab Results   Component Value Date    WBC 5.74 05/27/2024    HGB 13.7 05/27/2024    HCT 42.5 05/27/2024    MCV 86 05/27/2024     05/27/2024    CHOL 180 05/27/2024    TRIG 73 05/27/2024    HDL 47 05/27/2024    ALT 32 05/27/2024    AST 22 05/27/2024    BILITOT 0.4 05/27/2024    ALKPHOS 67 05/27/2024     05/27/2024    K 4.4 05/27/2024     05/27/2024    CREATININE 0.8 05/27/2024    ESTGFRAFRICA >60.0 06/16/2021    EGFRNONAA >60.0 06/16/2021    CALCIUM 9.1 05/27/2024    ALBUMIN 3.8 05/27/2024    BUN 14 05/27/2024    CO2 23 05/27/2024    TSH 1.283 05/27/2024    INR 1.1 02/15/2011    HGBA1C 5.5 05/27/2024    LDLCALC 118.4 05/27/2024    GLU 94 05/27/2024     [id number] 33 06/16/2021             Vital signs reviewed  PE:   APPEARANCE: Well nourished, well developed, in no acute distress.    HEAD: Normocephalic, atraumatic.  EYES: EOMI.  Conjunctivae noninjected.  NOSE: Mucosa pink. Airway clear.  NECK: Supple with no cervical lymphadenopathy.    CHEST: Good inspiratory effort. Lungs clear to auscultation with no wheezes or crackles.  CARDIOVASCULAR: Normal S1, S2. No rubs, murmurs, or gallops.  ABDOMEN: Bowel sounds normal. Not distended. Soft. No tenderness or masses. No organomegaly.  EXTREMITIES: No edema, cyanosis, or clubbing.    Review of Systems   Constitutional:  Negative for chills, fatigue, fever and night sweats.   HENT: Negative.     Respiratory:  Negative for cough, shortness of breath and wheezing.    Cardiovascular:  Negative for chest pain, palpitations and leg swelling.   Gastrointestinal: Negative.    Genitourinary: Negative.    Neurological: Negative.    Psychiatric/Behavioral: Negative.     All other systems reviewed and are negative.      IMPRESSION  1. Annual physical exam    2. Encounter for immunization    3. Class 3 severe obesity due to excess calories without serious comorbidity with body mass index (BMI) of 40.0 to 44.9 in adult    4. Plantar fasciitis of right foot    5. Gastroesophageal reflux disease, unspecified whether esophagitis present            PLAN      1. Annual physical exam (Primary)    - CBC Auto Differential; Future  - Comprehensive Metabolic Panel; Future  - TSH; Future  - T4, Free; Future  - Lipid Panel; Future  - Hemoglobin A1C; Future  - Iron and TIBC; Future  - Vitamin B12; Future      2. Encounter for immunization    - Tdap (BOOSTRIX) vaccine injection 0.5 mL      3. Class 3 severe obesity due to excess calories without serious comorbidity with body mass index (BMI) of 40.0 to 44.9 in adult    - tirzepatide, weight loss, (ZEPBOUND) 2.5 mg/0.5 mL PnIj; Inject 2.5 mg into the skin every 7 days.  Dispense: 2 mL; Refill:  4    Counseling provided on healthy lifestyle, encouraged to do moderate intensity regular exercise 30 minutes every day 5 days a week, to include vegetables and fruits and cut back on saturated fats and carbohydrates.       4. Plantar fasciitis of right foot    - diclofenac sodium (VOLTAREN) 1 % Gel; Apply 2 g topically once daily.  Dispense: 100 g; Refill: 0    Exercises printed to try at home    Ice, oral and topical NSAIDs advised      5. Gastroesophageal reflux disease, unspecified whether esophagitis present    Stable    Continue Protonix         SCREENINGS        Age/demographic appropriate health maintenance:    Health Maintenance Due   Topic Date Due    Mammogram  10/17/2024    TETANUS VACCINE  01/26/2025    Cervical Cancer Screening  07/10/2025           Spent adequate time in obtaining history and explaining differentials     35 minutes spent during this visit of which greater than 50% devoted to face-face counseling and coordination of care regarding diagnosis and management plan       Tami Gregorio   5/29/2025       [1]   Social History  Tobacco Use    Smoking status: Never     Passive exposure: Never    Smokeless tobacco: Never   Substance Use Topics    Alcohol use: Yes     Comment: 2 drinks twice/month    Drug use: No

## 2025-05-30 DIAGNOSIS — E66.9 OBESITY, UNSPECIFIED CLASS, UNSPECIFIED OBESITY TYPE, UNSPECIFIED WHETHER SERIOUS COMORBIDITY PRESENT: ICD-10-CM

## 2025-06-05 ENCOUNTER — OFFICE VISIT (OUTPATIENT)
Dept: OBSTETRICS AND GYNECOLOGY | Facility: CLINIC | Age: 45
End: 2025-06-05
Payer: COMMERCIAL

## 2025-06-05 VITALS
SYSTOLIC BLOOD PRESSURE: 118 MMHG | DIASTOLIC BLOOD PRESSURE: 82 MMHG | BODY MASS INDEX: 40.46 KG/M2 | WEIGHT: 214.31 LBS | HEIGHT: 61 IN

## 2025-06-05 DIAGNOSIS — K92.1 BLOOD IN STOOL: ICD-10-CM

## 2025-06-05 DIAGNOSIS — Z12.31 ENCOUNTER FOR SCREENING MAMMOGRAM FOR MALIGNANT NEOPLASM OF BREAST: ICD-10-CM

## 2025-06-05 DIAGNOSIS — Z01.419 WELL WOMAN EXAM WITH ROUTINE GYNECOLOGICAL EXAM: Primary | ICD-10-CM

## 2025-06-05 PROCEDURE — 99999 PR PBB SHADOW E&M-EST. PATIENT-LVL III: CPT | Mod: PBBFAC,,, | Performed by: OBSTETRICS & GYNECOLOGY

## 2025-06-06 ENCOUNTER — PATIENT MESSAGE (OUTPATIENT)
Dept: GASTROENTEROLOGY | Facility: CLINIC | Age: 45
End: 2025-06-06
Payer: COMMERCIAL

## 2025-06-09 DIAGNOSIS — K21.9 GASTROESOPHAGEAL REFLUX DISEASE, UNSPECIFIED WHETHER ESOPHAGITIS PRESENT: ICD-10-CM

## 2025-06-09 RX ORDER — PANTOPRAZOLE SODIUM 40 MG/1
40 TABLET, DELAYED RELEASE ORAL
Qty: 90 TABLET | Refills: 0 | Status: SHIPPED | OUTPATIENT
Start: 2025-06-09

## 2025-06-09 NOTE — TELEPHONE ENCOUNTER
Refill Routing Note   Medication(s) are not appropriate for processing by [facility] for the following reason(s):        Non-participating provider    [facility] action(s):  Route             Appointments  past 12m or future 3m with PCP    Date Provider   Last Visit   5/29/2025 Tami Gregorio MD   Next Visit   Visit date not found Tami Gregorio MD   ED visits in past 90 days: 0        Note composed:10:54 AM 06/09/2025

## 2025-06-11 ENCOUNTER — RESULTS FOLLOW-UP (OUTPATIENT)
Dept: OBSTETRICS AND GYNECOLOGY | Facility: CLINIC | Age: 45
End: 2025-06-11

## 2025-06-23 ENCOUNTER — HOSPITAL ENCOUNTER (OUTPATIENT)
Dept: RADIOLOGY | Facility: OTHER | Age: 45
Discharge: HOME | End: 2025-06-23
Attending: OBSTETRICS & GYNECOLOGY
Payer: COMMERCIAL

## 2025-06-23 DIAGNOSIS — Z12.31 ENCOUNTER FOR SCREENING MAMMOGRAM FOR MALIGNANT NEOPLASM OF BREAST: ICD-10-CM

## 2025-06-23 PROCEDURE — 77063 BREAST TOMOSYNTHESIS BI: CPT | Mod: 26,,, | Performed by: RADIOLOGY

## 2025-06-23 PROCEDURE — 77067 SCR MAMMO BI INCL CAD: CPT | Mod: TC

## 2025-06-23 PROCEDURE — 77067 SCR MAMMO BI INCL CAD: CPT | Mod: 26,,, | Performed by: RADIOLOGY

## (undated) DEVICE — SUT VICRYL 3-0 27 SH

## (undated) DEVICE — DRAIN PENROSE XRAY 12 X 1/4 ST

## (undated) DEVICE — SUT VICRYL CTD 2-0 GI 27 SH

## (undated) DEVICE — SUT 2-0 VICRYL / CT-1

## (undated) DEVICE — GOWN SURGICAL X-LARGE

## (undated) DEVICE — SEE MEDLINE ITEM 157117

## (undated) DEVICE — TRAY MINOR GEN SURG

## (undated) DEVICE — ADHESIVE MASTISOL VIAL 48/BX

## (undated) DEVICE — SUT MCRYL PLUS 4-0 PS2 27IN

## (undated) DEVICE — SEE MEDLINE ITEM 152622

## (undated) DEVICE — SEE MEDLINE ITEM 157148

## (undated) DEVICE — ELECTRODE REM PLYHSV RETURN 9

## (undated) DEVICE — APPLICATOR CHLORAPREP ORN 26ML

## (undated) DEVICE — DRESSING TRANS 4X4 TEGADERM

## (undated) DEVICE — SEE MEDLINE ITEM 146417

## (undated) DEVICE — SUT VICRYL PLUS 3-0 SH 18IN

## (undated) DEVICE — NDL HYPO REG 25G X 1 1/2

## (undated) DEVICE — DRESSING TELFA STRL 4X3 LF